# Patient Record
Sex: MALE | Race: WHITE | NOT HISPANIC OR LATINO
[De-identification: names, ages, dates, MRNs, and addresses within clinical notes are randomized per-mention and may not be internally consistent; named-entity substitution may affect disease eponyms.]

---

## 2017-02-27 ENCOUNTER — APPOINTMENT (OUTPATIENT)
Dept: PULMONOLOGY | Facility: CLINIC | Age: 72
End: 2017-02-27

## 2017-02-27 VITALS
DIASTOLIC BLOOD PRESSURE: 68 MMHG | BODY MASS INDEX: 34.91 KG/M2 | SYSTOLIC BLOOD PRESSURE: 110 MMHG | WEIGHT: 272 LBS | TEMPERATURE: 96.8 F | HEART RATE: 62 BPM | RESPIRATION RATE: 15 BRPM | HEIGHT: 74 IN

## 2018-02-05 ENCOUNTER — APPOINTMENT (OUTPATIENT)
Dept: PULMONOLOGY | Facility: CLINIC | Age: 73
End: 2018-02-05
Payer: MEDICARE

## 2018-02-05 VITALS
WEIGHT: 270 LBS | TEMPERATURE: 96.3 F | SYSTOLIC BLOOD PRESSURE: 129 MMHG | OXYGEN SATURATION: 97 % | BODY MASS INDEX: 34.65 KG/M2 | HEIGHT: 74 IN | HEART RATE: 69 BPM | RESPIRATION RATE: 16 BRPM | DIASTOLIC BLOOD PRESSURE: 89 MMHG

## 2018-02-05 DIAGNOSIS — E66.9 OBESITY, UNSPECIFIED: ICD-10-CM

## 2018-02-05 PROCEDURE — 99214 OFFICE O/P EST MOD 30 MIN: CPT

## 2019-03-04 ENCOUNTER — APPOINTMENT (OUTPATIENT)
Dept: PULMONOLOGY | Facility: CLINIC | Age: 74
End: 2019-03-04
Payer: MEDICARE

## 2019-03-04 VITALS
SYSTOLIC BLOOD PRESSURE: 135 MMHG | RESPIRATION RATE: 15 BRPM | TEMPERATURE: 97.7 F | WEIGHT: 275 LBS | HEART RATE: 60 BPM | DIASTOLIC BLOOD PRESSURE: 67 MMHG | BODY MASS INDEX: 35.29 KG/M2 | HEIGHT: 74 IN

## 2019-03-04 PROCEDURE — 99215 OFFICE O/P EST HI 40 MIN: CPT

## 2019-03-04 NOTE — HISTORY OF PRESENT ILLNESS
[FreeTextEntry1] : 74yo M with history of severe TATYANA on CPAP with excellent compliance and therapy related AHI of 0.4/h. He denies any mask leakages or problems related to the machine. He also feels well during the day and denies excessive daytime somnolence or other issues. \par Data from machine was reviewed and is as follows:\par DME -- [employer]\par Device -- Airsense 10 Autoset\par Percent days with device usage in last 30 days -- 100%\par Average usage (days used) -- 8h 49min\par Average treatment related BRANDI -- 0.4/h\par Mask leakage -- acceptable\par Pressure -- 13 cm H20 EPR 2\par

## 2019-03-04 NOTE — REVIEW OF SYSTEMS
[Negative] : Psychiatric [EDS: ESS=____] : no daytime somnolence [Difficulty Initiating Sleep] : no difficulty falling asleep [Difficulty Maintaining Sleep] : no difficulty maintaining sleep [Lower Extremity Discomfort] : no lower extremity discomfort [Unusual Sleep Behavior] : no unusual sleep behavior [Hypersomnolence] : not sleeping much more than usual

## 2019-03-04 NOTE — PHYSICAL EXAM
[Normal Appearance] : normal appearance [General Appearance - In No Acute Distress] : no acute distress [Normal Conjunctiva] : the conjunctiva exhibited no abnormalities [IV] : IV [Neck Appearance] : the appearance of the neck was normal [] : the neck was supple [Heart Rate And Rhythm] : heart rate was normal and rhythm regular [Heart Sounds] : normal S1 and S2 [Respiration, Rhythm And Depth] : normal respiratory rhythm and effort [Auscultation Breath Sounds / Voice Sounds] : lungs were clear to auscultation bilaterally [Involuntary Movements] : no involuntary movements were seen [Nail Clubbing] : no clubbing of the fingernails [Skin Color & Pigmentation] : normal skin color and pigmentation [No Focal Deficits] : no focal deficits [Oriented To Time, Place, And Person] : oriented to person, place, and time [Affect] : the affect was normal

## 2019-03-04 NOTE — ASSESSMENT
[FreeTextEntry1] : 74yo M with history of severe TATYANA on CPAP with excellent compliance and therapy related AHI of 0.4/h. He denies any mask leakages or problems related to the machine. He also feels well during the day and denies excessive daytime somnolence or other issues. \par Data from machine was reviewed and is as follows:\par DME -- [employer]\par Device -- Airsense 10 [employer]oset\par Percent days with device usage in last 30 days -- 100%\par Average usage (days used) -- 8h 49min\par Average treatment related BRANDI -- 0.4/h\par Mask leakage -- acceptable\par Pressure -- 13 cm H20 EPR 2\par \par Patient will continue to use CPAP nightly\par I explained the rationale for continued treatment of TATYANA -- to improve quality of life, daytime function and to decrease the cardiometabolic and other medical risks that are associated with untreated TATYANA. The patient verbalized understanding.\par I explained in detail the relationship between obesity and obstructive sleep apnea and the role of weight loss in improving severity of TATYANA.\par

## 2019-07-02 ENCOUNTER — INPATIENT (INPATIENT)
Facility: HOSPITAL | Age: 74
LOS: 13 days | Discharge: HOME | DRG: 327 | End: 2019-07-16
Attending: SURGERY | Admitting: INTERNAL MEDICINE
Payer: MEDICARE

## 2019-07-02 VITALS
RESPIRATION RATE: 14 BRPM | SYSTOLIC BLOOD PRESSURE: 135 MMHG | TEMPERATURE: 98 F | DIASTOLIC BLOOD PRESSURE: 65 MMHG | HEIGHT: 74 IN | WEIGHT: 278 LBS | HEART RATE: 66 BPM | OXYGEN SATURATION: 98 %

## 2019-07-02 DIAGNOSIS — Z91.89 OTHER SPECIFIED PERSONAL RISK FACTORS, NOT ELSEWHERE CLASSIFIED: ICD-10-CM

## 2019-07-02 DIAGNOSIS — R07.89 OTHER CHEST PAIN: ICD-10-CM

## 2019-07-02 DIAGNOSIS — Z29.9 ENCOUNTER FOR PROPHYLACTIC MEASURES, UNSPECIFIED: ICD-10-CM

## 2019-07-02 DIAGNOSIS — D64.9 ANEMIA, UNSPECIFIED: ICD-10-CM

## 2019-07-02 DIAGNOSIS — E11.9 TYPE 2 DIABETES MELLITUS WITHOUT COMPLICATIONS: ICD-10-CM

## 2019-07-02 DIAGNOSIS — N18.3 CHRONIC KIDNEY DISEASE, STAGE 3 (MODERATE): ICD-10-CM

## 2019-07-02 DIAGNOSIS — I10 ESSENTIAL (PRIMARY) HYPERTENSION: ICD-10-CM

## 2019-07-02 DIAGNOSIS — R42 DIZZINESS AND GIDDINESS: ICD-10-CM

## 2019-07-02 DIAGNOSIS — R06.09 OTHER FORMS OF DYSPNEA: ICD-10-CM

## 2019-07-02 LAB
ALBUMIN SERPL ELPH-MCNC: 4.4 G/DL — SIGNIFICANT CHANGE UP (ref 3.3–5)
ALP SERPL-CCNC: 54 U/L — SIGNIFICANT CHANGE UP (ref 40–120)
ALT FLD-CCNC: 18 U/L — SIGNIFICANT CHANGE UP (ref 10–45)
ANION GAP SERPL CALC-SCNC: 13 MMOL/L — SIGNIFICANT CHANGE UP (ref 5–17)
AST SERPL-CCNC: 20 U/L — SIGNIFICANT CHANGE UP (ref 10–40)
BASOPHILS # BLD AUTO: 0 K/UL — SIGNIFICANT CHANGE UP (ref 0–0.2)
BASOPHILS NFR BLD AUTO: 0 % — SIGNIFICANT CHANGE UP (ref 0–2)
BILIRUB SERPL-MCNC: 0.4 MG/DL — SIGNIFICANT CHANGE UP (ref 0.2–1.2)
BLD GP AB SCN SERPL QL: NEGATIVE — SIGNIFICANT CHANGE UP
BUN SERPL-MCNC: 27 MG/DL — HIGH (ref 7–23)
CALCIUM SERPL-MCNC: 8.9 MG/DL — SIGNIFICANT CHANGE UP (ref 8.4–10.5)
CHLORIDE SERPL-SCNC: 99 MMOL/L — SIGNIFICANT CHANGE UP (ref 96–108)
CO2 SERPL-SCNC: 27 MMOL/L — SIGNIFICANT CHANGE UP (ref 22–31)
CREAT SERPL-MCNC: 1.62 MG/DL — HIGH (ref 0.5–1.3)
EOSINOPHIL # BLD AUTO: 0.1 K/UL — SIGNIFICANT CHANGE UP (ref 0–0.5)
EOSINOPHIL NFR BLD AUTO: 3 % — SIGNIFICANT CHANGE UP (ref 0–6)
GLUCOSE BLDC GLUCOMTR-MCNC: 101 MG/DL — HIGH (ref 70–99)
GLUCOSE BLDC GLUCOMTR-MCNC: 137 MG/DL — HIGH (ref 70–99)
GLUCOSE SERPL-MCNC: 132 MG/DL — HIGH (ref 70–99)
HCT VFR BLD CALC: 24.7 % — LOW (ref 39–50)
HCT VFR BLD CALC: 25 % — LOW (ref 39–50)
HGB BLD-MCNC: 8.3 G/DL — LOW (ref 13–17)
HGB BLD-MCNC: 8.4 G/DL — LOW (ref 13–17)
LYMPHOCYTES # BLD AUTO: 0.6 K/UL — LOW (ref 1–3.3)
LYMPHOCYTES # BLD AUTO: 12.9 % — LOW (ref 13–44)
MAGNESIUM SERPL-MCNC: 2.2 MG/DL — SIGNIFICANT CHANGE UP (ref 1.6–2.6)
MCHC RBC-ENTMCNC: 30.1 PG — SIGNIFICANT CHANGE UP (ref 27–34)
MCHC RBC-ENTMCNC: 30.4 PG — SIGNIFICANT CHANGE UP (ref 27–34)
MCHC RBC-ENTMCNC: 33.6 GM/DL — SIGNIFICANT CHANGE UP (ref 32–36)
MCHC RBC-ENTMCNC: 33.7 GM/DL — SIGNIFICANT CHANGE UP (ref 32–36)
MCV RBC AUTO: 89.7 FL — SIGNIFICANT CHANGE UP (ref 80–100)
MCV RBC AUTO: 90.3 FL — SIGNIFICANT CHANGE UP (ref 80–100)
MONOCYTES # BLD AUTO: 0.4 K/UL — SIGNIFICANT CHANGE UP (ref 0–0.9)
MONOCYTES NFR BLD AUTO: 7.6 % — SIGNIFICANT CHANGE UP (ref 2–14)
NEUTROPHILS # BLD AUTO: 3.6 K/UL — SIGNIFICANT CHANGE UP (ref 1.8–7.4)
NEUTROPHILS NFR BLD AUTO: 76.5 % — SIGNIFICANT CHANGE UP (ref 43–77)
PLATELET # BLD AUTO: 155 K/UL — SIGNIFICANT CHANGE UP (ref 150–400)
PLATELET # BLD AUTO: 158 K/UL — SIGNIFICANT CHANGE UP (ref 150–400)
POTASSIUM SERPL-MCNC: 4.2 MMOL/L — SIGNIFICANT CHANGE UP (ref 3.5–5.3)
POTASSIUM SERPL-SCNC: 4.2 MMOL/L — SIGNIFICANT CHANGE UP (ref 3.5–5.3)
PROT SERPL-MCNC: 7.1 G/DL — SIGNIFICANT CHANGE UP (ref 6–8.3)
RBC # BLD: 2.75 M/UL — LOW (ref 4.2–5.8)
RBC # BLD: 2.77 M/UL — LOW (ref 4.2–5.8)
RBC # FLD: 16 % — HIGH (ref 10.3–14.5)
RBC # FLD: 16.1 % — HIGH (ref 10.3–14.5)
RH IG SCN BLD-IMP: POSITIVE — SIGNIFICANT CHANGE UP
SODIUM SERPL-SCNC: 139 MMOL/L — SIGNIFICANT CHANGE UP (ref 135–145)
WBC # BLD: 4.6 K/UL — SIGNIFICANT CHANGE UP (ref 3.8–10.5)
WBC # BLD: 4.7 K/UL — SIGNIFICANT CHANGE UP (ref 3.8–10.5)
WBC # FLD AUTO: 4.6 K/UL — SIGNIFICANT CHANGE UP (ref 3.8–10.5)
WBC # FLD AUTO: 4.7 K/UL — SIGNIFICANT CHANGE UP (ref 3.8–10.5)

## 2019-07-02 PROCEDURE — 71046 X-RAY EXAM CHEST 2 VIEWS: CPT | Mod: 26

## 2019-07-02 PROCEDURE — 99223 1ST HOSP IP/OBS HIGH 75: CPT

## 2019-07-02 RX ORDER — DEXTROSE 50 % IN WATER 50 %
15 SYRINGE (ML) INTRAVENOUS ONCE
Refills: 0 | Status: DISCONTINUED | OUTPATIENT
Start: 2019-07-02 | End: 2019-07-02

## 2019-07-02 RX ORDER — INSULIN LISPRO 100/ML
VIAL (ML) SUBCUTANEOUS
Refills: 0 | Status: DISCONTINUED | OUTPATIENT
Start: 2019-07-02 | End: 2019-07-12

## 2019-07-02 RX ORDER — HYDROCHLOROTHIAZIDE 25 MG
25 TABLET ORAL DAILY
Refills: 0 | Status: DISCONTINUED | OUTPATIENT
Start: 2019-07-02 | End: 2019-07-06

## 2019-07-02 RX ORDER — DEXTROSE 50 % IN WATER 50 %
25 SYRINGE (ML) INTRAVENOUS ONCE
Refills: 0 | Status: DISCONTINUED | OUTPATIENT
Start: 2019-07-02 | End: 2019-07-02

## 2019-07-02 RX ORDER — LABETALOL HCL 100 MG
400 TABLET ORAL DAILY
Refills: 0 | Status: DISCONTINUED | OUTPATIENT
Start: 2019-07-02 | End: 2019-07-02

## 2019-07-02 RX ORDER — LABETALOL HCL 100 MG
400 TABLET ORAL
Qty: 0 | Refills: 0 | DISCHARGE

## 2019-07-02 RX ORDER — LOSARTAN POTASSIUM 100 MG/1
50 TABLET, FILM COATED ORAL DAILY
Refills: 0 | Status: DISCONTINUED | OUTPATIENT
Start: 2019-07-02 | End: 2019-07-12

## 2019-07-02 RX ORDER — INSULIN LISPRO 100/ML
VIAL (ML) SUBCUTANEOUS AT BEDTIME
Refills: 0 | Status: DISCONTINUED | OUTPATIENT
Start: 2019-07-02 | End: 2019-07-02

## 2019-07-02 RX ORDER — SODIUM CHLORIDE 9 MG/ML
3 INJECTION INTRAMUSCULAR; INTRAVENOUS; SUBCUTANEOUS EVERY 8 HOURS
Refills: 0 | Status: DISCONTINUED | OUTPATIENT
Start: 2019-07-02 | End: 2019-07-16

## 2019-07-02 RX ORDER — SODIUM CHLORIDE 9 MG/ML
1000 INJECTION, SOLUTION INTRAVENOUS
Refills: 0 | Status: DISCONTINUED | OUTPATIENT
Start: 2019-07-02 | End: 2019-07-02

## 2019-07-02 RX ORDER — AMLODIPINE BESYLATE 2.5 MG/1
10 TABLET ORAL DAILY
Refills: 0 | Status: DISCONTINUED | OUTPATIENT
Start: 2019-07-02 | End: 2019-07-12

## 2019-07-02 RX ORDER — GLUCAGON INJECTION, SOLUTION 0.5 MG/.1ML
1 INJECTION, SOLUTION SUBCUTANEOUS ONCE
Refills: 0 | Status: DISCONTINUED | OUTPATIENT
Start: 2019-07-02 | End: 2019-07-02

## 2019-07-02 RX ORDER — DEXTROSE 50 % IN WATER 50 %
12.5 SYRINGE (ML) INTRAVENOUS ONCE
Refills: 0 | Status: DISCONTINUED | OUTPATIENT
Start: 2019-07-02 | End: 2019-07-02

## 2019-07-02 RX ORDER — HEPARIN SODIUM 5000 [USP'U]/ML
5000 INJECTION INTRAVENOUS; SUBCUTANEOUS EVERY 8 HOURS
Refills: 0 | Status: DISCONTINUED | OUTPATIENT
Start: 2019-07-02 | End: 2019-07-02

## 2019-07-02 RX ORDER — ATORVASTATIN CALCIUM 80 MG/1
20 TABLET, FILM COATED ORAL AT BEDTIME
Refills: 0 | Status: DISCONTINUED | OUTPATIENT
Start: 2019-07-02 | End: 2019-07-12

## 2019-07-02 RX ORDER — LABETALOL HCL 100 MG
200 TABLET ORAL
Refills: 0 | Status: DISCONTINUED | OUTPATIENT
Start: 2019-07-02 | End: 2019-07-12

## 2019-07-02 RX ADMIN — AMLODIPINE BESYLATE 10 MILLIGRAM(S): 2.5 TABLET ORAL at 18:26

## 2019-07-02 RX ADMIN — Medication 200 MILLIGRAM(S): at 18:26

## 2019-07-02 RX ADMIN — SODIUM CHLORIDE 3 MILLILITER(S): 9 INJECTION INTRAMUSCULAR; INTRAVENOUS; SUBCUTANEOUS at 17:33

## 2019-07-02 RX ADMIN — ATORVASTATIN CALCIUM 20 MILLIGRAM(S): 80 TABLET, FILM COATED ORAL at 22:25

## 2019-07-02 RX ADMIN — SODIUM CHLORIDE 3 MILLILITER(S): 9 INJECTION INTRAMUSCULAR; INTRAVENOUS; SUBCUTANEOUS at 22:20

## 2019-07-02 NOTE — H&P CARDIOLOGY - HISTORY OF PRESENT ILLNESS
74 yo  male (no implantable devices) PMH HTN, HLD and DMT2 presents for cardiac angiogram. Patient reports occasional episodes of moderate chest discomfort associated with dizziness, dyspnea and lightheadedness x 2 weeks ago. Seen and evaluated by Dr. Mortensen (cards) recommended to have nuclear stress test which was completed on 6/25/19 revealing normal results, normal EF and wall motion, diaphragmatic attenuation. Symptoms still persist- recommends to have [facility] for further ischemic evaluation. 74 yo  male (no implantable devices) PMH HTN, HLD, TATYANA and DMT2 (a1c June 2019, managed by PCP, uncomplicated) presents for cardiac angiogram. Patient reports occasional episodes of moderate chest discomfort associated with dizziness, dyspnea and lightheadedness with moderate exertion x 2 weeks ago. Seen and evaluated by Dr. Mortensen (cards) recommended to have nuclear stress test which was completed on 6/25/19 revealing normal results, normal EF and wall motion, diaphragmatic attenuation. Symptoms still persist- recommends to have C for further ischemic evaluation. 74 yo  male (no implantable devices) PMH HTN, HLD, TATYANA, CKD III and DMT2 (a1c June 2019, managed by PCP, uncomplicated) presents for cardiac angiogram. Patient reports occasional episodes of moderate chest discomfort associated with dizziness, dyspnea and lightheadedness with moderate exertion x 2 weeks ago. Seen and evaluated by Dr. Mortensen (cards) recommended to have nuclear stress test which was completed on 6/25/19 revealing normal results, normal EF and wall motion, diaphragmatic attenuation. Symptoms still persist- recommends to have C for further ischemic evaluation.

## 2019-07-02 NOTE — PROGRESS NOTE ADULT - SUBJECTIVE AND OBJECTIVE BOX
-MEDICINE TRANSFER ACCEPT NOTE    Dr. Sunny Parnell  [phone]    In brief. -MEDICINE TRANSFER ACCEPT NOTE    Dr. Sunny Parnell  [phone]    In brief.   74 yo M PMHx HTN, HLD, TATYANA, CKD III and DMT2 (a1c June 2019, managed by PCP, uncomplicated) presents for cardiac angiogram. Patient reports occasional episodes of moderate chest discomfort associated with dizziness, dyspnea and lightheadedness with moderate exertion x 2 weeks ago. Seen and evaluated by Dr. Mortensen (cards) recommended to have nuclear stress test which was completed on 6/25/19 revealing normal results, normal EF and wall motion, diaphragmatic attenuation. Symptoms still persist- recommends to have [facility] for further ischemic evaluation. (02 Jul 2019 09:24)      CONSTITUTIONAL: No fevers or chills  EYES/ENT: No visual changes. No discharge from eyes. No vertigo. No throat pain. No dysphagia.  NECK: No pain or stiffness or rigidity.  RESPIRATORY: No cough, wheezing, or hemoptysis. No shortness of breath.  CARDIOVASCULAR: No chest pain or palpitations.   GASTROINTESTINAL: No abdominal pain. No nausea, vomiting, or hematemesis; No diarrhea or constipation. No melena or hematochezia.  GENITOURINARY: No dysuria, hesitancy, frequency or hematuria.  NEUROLOGICAL: No numbness or weakness. No change in speech.  MSK: No joint swelling or erythema. No back pain.  SKIN: No itching or rashes.   PSYCH: Normal affect. Normal mood.    Review of systems negative except for items noted above.      PAST MEDICAL & SURGICAL HISTORY:  TATYANA (obstructive sleep apnea)  Mitral regurgitation  Diabetes  HLD (hyperlipidemia)  HTN (hypertension)  Abdominal hernia  Uncontrolled hypertension: BP ranged 170-250/--asymptomatic. Sent from [facility] office on 8/12/13 to [facility] ER via ambulance  Snoring: TATYANA precautions--responds affirmatively to STOP BANG questionnaire--admits to loud snoring and daytime somnolence; witnessed apneic events by wife; age &gt; 50; gender, male  Mass: right foot soft tissue mass in August 2013  Tooth decay: surgery for tooth extraction  S/P tonsillectomy      MEDICATIONS  (STANDING):  amLODIPine   Tablet 10 milliGRAM(s) Oral daily  atorvastatin 20 milliGRAM(s) Oral at bedtime  hydrochlorothiazide 25 milliGRAM(s) Oral daily  insulin lispro (HumaLOG) corrective regimen sliding scale   SubCutaneous three times a day before meals  labetalol 400 milliGRAM(s) Oral daily  losartan 50 milliGRAM(s) Oral daily  sodium chloride 0.9% lock flush 3 milliLiter(s) IV Push every 8 hours    MEDICATIONS  (PRN):      Allergies    No Known Allergies    Intolerances        Vital Signs Last 24 Hrs  T(C): 36.6 (02 Jul 2019 15:40), Max: 36.9 (02 Jul 2019 09:24)  T(F): 97.9 (02 Jul 2019 15:40), Max: 98.4 (02 Jul 2019 09:24)  HR: 57 (02 Jul 2019 15:40) (57 - 66)  BP: 150/70 (02 Jul 2019 15:40) (135/65 - 150/70)  BP(mean): 88 (02 Jul 2019 09:24) (88 - 88)  RR: 18 (02 Jul 2019 15:40) (14 - 18)  SpO2: 97% (02 Jul 2019 15:40) (97% - 98%)    T(C): 36.6 (07-02-19 @ 15:40), Max: 36.9 (07-02-19 @ 09:24)  T(F): 97.9 (07-02-19 @ 15:40), Max: 98.4 (07-02-19 @ 09:24)  HR: 57 (07-02-19 @ 15:40) (57 - 66)  BP: 150/70 (07-02-19 @ 15:40) (135/65 - 150/70)  ABP: --  ABP(mean): --  RR: 18 (07-02-19 @ 15:40) (14 - 18)  SpO2: 97% (07-02-19 @ 15:40) (97% - 98%)      CONSTITUTIONAL: No acute distress. Speaking in full sentences.   HEENT:  Head atraumatic. Conjunctiva clear B/L. Nasal mucosa normal. Moist oral mucosa. No posterior pharyngeal lesions noted.  Cardiovascular: RRR with no murmurs. No JVD noted. No lower extremity edema B/L.  Respiratory: Lungs CTAB. No accessory muscle use. Speaking in full sentences.  Gastrointestinal:  Soft, nontender. Non-distended. Non-rigid. No CVA tenderness B/L.  MSK:  No joint swelling. No joint erythema B/L. No midline spinal tenderness.  Vascular: Radial pulses 2+ B/L. Dorsalis pedis pulses 2+ B/L.  Neurologic:  Alert and awake. Oriented x3. Moving all extremities. Following commands. Making eye contact. No focal deficits.  Skin:  No rashes noted. No skin erythema noted. Extremities warm and well perfused throughout.  Psych:  Normal affect. Normal Mood.     LABS                        8.3    4.6   )-----------( 155      ( 02 Jul 2019 10:13 )             24.7     07-02    139  |  99  |  27<H>  ----------------------------<  132<H>  4.2   |  27  |  1.62<H>    Ca    8.9      02 Jul 2019 09:32  Mg     2.2     07-02    TPro  7.1  /  Alb  4.4  /  TBili  0.4  /  DBili  x   /  AST  20  /  ALT  18  /  AlkPhos  54  07-02          MICROBIOLOGY:    PERSONALLY REVIEWED -- RADIOLOGY & ADDITIONAL STUDIES: -MEDICINE TRANSFER ACCEPT NOTE    Dr. Sunny Parnell  [phone]    HPI  72 yo M PMHx HTN, HLD, TATYANA, CKD III and DMT2 who presents with 2 weeks of dizziness and dyspnea with exertion with occasional episodes of bilateral shoulder heaviness. The heaviness was reported to be nonradiating in his shoulders without additional exacerbating factors. Symptoms went away with rest. The patient was seen and evaluated by Dr. Mortensen (cardioliogy) for a nuclear stress test which was completed on 6/25/19 revealing no inducible ischemia. The patient yesterday played golf but again had the same symptoms with exertion. The patient presented for elective left heart cath however preoperative labs showing hemoglobin of 8.4 from 12 (this past May). Patient transferred to medicine for management and workup of anemia prior to any potential ischemic workup. Patient seen at bedside. Denies any dizziness with standing nor dizziness at rest. Denies any blood in the stool, abdominal pain, nausea, vomiting, diarrhea, fever, chills, back pain, hemoptysis, hematemesis. His last C-scope and EGD was 7 years ago and was "normal." He was given an EGD because of his hemoglobin of 12.     CONSTITUTIONAL: No fevers or chills  EYES/ENT: No visual changes. No discharge from eyes. No vertigo. No throat pain. No dysphagia.  NECK: No pain or stiffness or rigidity.  RESPIRATORY: No cough, wheezing, or hemoptysis. No shortness of breath.  CARDIOVASCULAR: No chest pain or palpitations.   GASTROINTESTINAL: No abdominal pain. No nausea, vomiting, or hematemesis; No diarrhea or constipation. No melena or hematochezia.  GENITOURINARY: No dysuria, hesitancy, frequency or hematuria.  NEUROLOGICAL: No numbness or weakness. No change in speech.  MSK: No joint swelling or erythema. No back pain.   SKIN: No itching or rashes.   PSYCH: Normal affect. Normal mood.    Review of systems negative except for items noted above.    PAST MEDICAL & SURGICAL HISTORY:  TATYANA (obstructive sleep apnea)  Mitral regurgitation  Diabetes  HLD (hyperlipidemia)  HTN (hypertension)  Abdominal hernia  Mass: right foot soft tissue mass in August 2013  Tooth decay: surgery for tooth extraction  S/P tonsillectomy    MEDICATIONS  (STANDING):  amLODIPine   Tablet 10 milliGRAM(s) Oral daily  atorvastatin 20 milliGRAM(s) Oral at bedtime  hydrochlorothiazide 25 milliGRAM(s) Oral daily  insulin lispro (HumaLOG) corrective regimen sliding scale   SubCutaneous three times a day before meals  labetalol 400 milliGRAM(s) Oral daily  losartan 50 milliGRAM(s) Oral daily  sodium chloride 0.9% lock flush 3 milliLiter(s) IV Push every 8 hours    MEDICATIONS  (PRN):  Allergies    Vital Signs Last 24 Hrs  T(C): 36.6 (02 Jul 2019 15:40), Max: 36.9 (02 Jul 2019 09:24)  T(F): 97.9 (02 Jul 2019 15:40), Max: 98.4 (02 Jul 2019 09:24)  HR: 57 (02 Jul 2019 15:40) (57 - 66)  BP: 150/70 (02 Jul 2019 15:40) (135/65 - 150/70)  BP(mean): 88 (02 Jul 2019 09:24) (88 - 88)  RR: 18 (02 Jul 2019 15:40) (14 - 18)  SpO2: 97% (02 Jul 2019 15:40) (97% - 98%)    T(C): 36.6 (07-02-19 @ 15:40), Max: 36.9 (07-02-19 @ 09:24)  T(F): 97.9 (07-02-19 @ 15:40), Max: 98.4 (07-02-19 @ 09:24)  HR: 57 (07-02-19 @ 15:40) (57 - 66)  BP: 150/70 (07-02-19 @ 15:40) (135/65 - 150/70)  RR: 18 (07-02-19 @ 15:40) (14 - 18)  SpO2: 97% (07-02-19 @ 15:40) (97% - 98%)    Orthostatic vital signs:  NEGATIVE.     CONSTITUTIONAL: No acute distress.   HEENT:  Head atraumatic. Conjunctiva clear B/L. Nasal mucosa normal. Moist oral mucosa. No posterior pharyngeal lesions noted.  Cardiovascular: RRR with no murmurs. No JVD noted. No lower extremity edema B/L. No carotid bruits B/L. Extremities warm and well perfused throughout.  Respiratory: Lungs CTAB. No accessory muscle use.   Gastrointestinal:  Soft, nontender. Non-distended. Non-rigid. No CVA tenderness B/L. BROWN STOOL noted in rectal vault.   MSK:  No joint swelling. No joint erythema B/L. No midline spinal tenderness.  Vascular: Radial pulses 2+ B/L. Dorsalis pedis pulses 2+ B/L.  Neurologic:  Alert and awake. Oriented x3. Moving all extremities. Following commands. Making eye contact. No focal deficits.  Skin:  No rashes noted. No skin erythema noted. No bruising noted on extremities or torso.   Psych:  Normal affect. Normal Mood.     LABS                        8.3    4.6   )-----------( 155      ( 02 Jul 2019 10:13 )             24.7     07-02    139  |  99  |  27<H>  ----------------------------<  132<H>  4.2   |  27  |  1.62<H>    Ca    8.9      02 Jul 2019 09:32  Mg     2.2     07-02    TPro  7.1  /  Alb  4.4  /  TBili  0.4  /  DBili  x   /  AST  20  /  ALT  18  /  AlkPhos  54  07-02    PERSONALLY REVIEWED -- RADIOLOGY & ADDITIONAL STUDIES:  EKG:  NSR. Nonspecific st-t changes.

## 2019-07-02 NOTE — PROGRESS NOTE ADULT - PROBLEM SELECTOR PLAN 1
Unclear etiology. Patient with Hgb of 12 in May. Rectal with brown stool. No physical exam findings suggesting acute blood loss. Possibly related to thyroid disease or nutritional deficiency. No gamma gap to suggests plasma cell dyscrasia.  -retic, serial cbc, b12, folate, iron panel.   -Type and screen.   -Patient's last screening colonoscopy was 7 years ago and he is unaware of when is next one is due. No polyps were detected per patient. Unclear etiology. Patient with Hgb of 12 in May. Rectal with brown stool. No physical exam findings suggesting acute blood loss. Possibly related to thyroid disease or nutritional deficiency. No gamma gap to suggests plasma cell dyscrasia.  -retic, serial cbc, tsh, b12, folate, iron panel.   -Type and screen.   -Patient's last screening colonoscopy was 7 years ago and he is unaware of when is next one is due. No polyps were detected per patient.  -Consider GI consult. Patient may benefit from screening colonoscopy, but may be able to be done outpatient. Unclear etiology. Patient with Hgb of 12 in May. Rectal with brown stool. No physical exam findings suggesting acute blood loss. Possibly related to thyroid disease or nutritional deficiency. No gamma gap to suggests plasma cell dyscrasia.  -ORTHOSTATICS - negative.   -retic, serial cbc, tsh, b12, folate, iron panel.   -Type and screen.   -Patient's last screening colonoscopy was 7 years ago and he is unaware of when is next one is due. No polyps were detected per patient.  -Consider GI consult. Patient may benefit from screening colonoscopy, but may be able to be done outpatient.  -HOLD HSQ till AM to ensure CBC stable.

## 2019-07-02 NOTE — PROGRESS NOTE ADULT - ASSESSMENT
72 yo M PMHx HTN, HLD, TATYANA, CKD III and DMT2 who presents with 2 weeks of dizziness and dyspnea on exertion found to have normocytic anemia.

## 2019-07-02 NOTE — PROGRESS NOTE ADULT - PROBLEM SELECTOR PLAN 2
Suspect secondary to anemia. Less likely cardiac in nature given negative nuclear stress test. EKG nonischemic.  -CXR PA/LAT  -On RA.   -Continue to monitor. Suspect secondary to anemia. Less likely cardiac in nature given negative nuclear stress test. EKG nonischemic. PNA unlikely given lack of cough, fever, and PE signs. VTE also unlikely given lack of LE edema, tachycardia, and risk factors.  -CXR PA/LAT  -On RA.   -Continue to monitor.

## 2019-07-02 NOTE — H&P CARDIOLOGY - PMH
Abdominal hernia    Diabetes    HLD (hyperlipidemia)    HTN (hypertension)    HTN (hypertension)    Mass  right foot soft tissue mass in August 2013  Mitral regurgitation    Snoring  TATYANA precautions--responds affirmatively to STOP BANG questionnaire--admits to loud snoring and daytime somnolence; witnessed apneic events by wife; age > 50; gender, male  Uncontrolled hypertension  BP ranged 170-250/--asymptomatic. Sent from PAST office on 8/12/13 to [facility] ER via ambulance Abdominal hernia    Diabetes    HLD (hyperlipidemia)    HTN (hypertension)    Mass  right foot soft tissue mass in August 2013  Mitral regurgitation    TATYANA (obstructive sleep apnea)    Snoring  TATYANA precautions--responds affirmatively to STOP BANG questionnaire--admits to loud snoring and daytime somnolence; witnessed apneic events by wife; age > 50; gender, male  Uncontrolled hypertension  BP ranged 170-250/--asymptomatic. Sent from PAST office on 8/12/13 to [facility] ER via ambulance

## 2019-07-02 NOTE — PROGRESS NOTE ADULT - PROBLEM SELECTOR PLAN 4
Continue home medications. BUN/Cr ratio suggestive of chronic process. Unknown baseline.   -Repeat BMP in AM.

## 2019-07-02 NOTE — PROGRESS NOTE ADULT - PROBLEM SELECTOR PLAN 3
Suspect secondary to anemia. Less likely cardiac in nature given negative nuclear stress test. Orthostatics negative.  -Anemia workup as above.  -Once workup complete, revisit need for ischemic workup with cardiology. Suspect secondary to anemia. Less likely cardiac in nature given negative nuclear stress test. Orthostatics negative. Exam nonfocal making stroke less likely.   -Anemia workup as above.  -Once workup complete, revisit need for ischemic workup with cardiology.

## 2019-07-03 LAB
ALBUMIN SERPL ELPH-MCNC: 3.9 G/DL — SIGNIFICANT CHANGE UP (ref 3.3–5)
ALP SERPL-CCNC: 48 U/L — SIGNIFICANT CHANGE UP (ref 40–120)
ALT FLD-CCNC: 14 U/L — SIGNIFICANT CHANGE UP (ref 10–45)
ANION GAP SERPL CALC-SCNC: 12 MMOL/L — SIGNIFICANT CHANGE UP (ref 5–17)
AST SERPL-CCNC: 19 U/L — SIGNIFICANT CHANGE UP (ref 10–40)
BASOPHILS # BLD AUTO: 0.04 K/UL — SIGNIFICANT CHANGE UP (ref 0–0.2)
BASOPHILS NFR BLD AUTO: 0.8 % — SIGNIFICANT CHANGE UP (ref 0–2)
BILIRUB SERPL-MCNC: 0.4 MG/DL — SIGNIFICANT CHANGE UP (ref 0.2–1.2)
BLD GP AB SCN SERPL QL: NEGATIVE — SIGNIFICANT CHANGE UP
BUN SERPL-MCNC: 27 MG/DL — HIGH (ref 7–23)
CALCIUM SERPL-MCNC: 9.2 MG/DL — SIGNIFICANT CHANGE UP (ref 8.4–10.5)
CHLORIDE SERPL-SCNC: 103 MMOL/L — SIGNIFICANT CHANGE UP (ref 96–108)
CO2 SERPL-SCNC: 29 MMOL/L — SIGNIFICANT CHANGE UP (ref 22–31)
CREAT SERPL-MCNC: 1.52 MG/DL — HIGH (ref 0.5–1.3)
EOSINOPHIL # BLD AUTO: 0.12 K/UL — SIGNIFICANT CHANGE UP (ref 0–0.5)
EOSINOPHIL NFR BLD AUTO: 2.4 % — SIGNIFICANT CHANGE UP (ref 0–6)
FERRITIN SERPL-MCNC: 17 NG/ML — LOW (ref 30–400)
FOLATE SERPL-MCNC: 12.8 NG/ML — SIGNIFICANT CHANGE UP
GLUCOSE BLDC GLUCOMTR-MCNC: 107 MG/DL — HIGH (ref 70–99)
GLUCOSE BLDC GLUCOMTR-MCNC: 122 MG/DL — HIGH (ref 70–99)
GLUCOSE BLDC GLUCOMTR-MCNC: 122 MG/DL — HIGH (ref 70–99)
GLUCOSE BLDC GLUCOMTR-MCNC: 142 MG/DL — HIGH (ref 70–99)
GLUCOSE BLDC GLUCOMTR-MCNC: 152 MG/DL — HIGH (ref 70–99)
GLUCOSE SERPL-MCNC: 102 MG/DL — HIGH (ref 70–99)
HBA1C BLD-MCNC: 5.4 % — SIGNIFICANT CHANGE UP (ref 4–5.6)
HCT VFR BLD CALC: 24.7 % — LOW (ref 39–50)
HCT VFR BLD CALC: 25 % — LOW (ref 39–50)
HGB BLD-MCNC: 7.2 G/DL — LOW (ref 13–17)
HGB BLD-MCNC: 8.2 G/DL — LOW (ref 13–17)
IMM GRANULOCYTES NFR BLD AUTO: 0.2 % — SIGNIFICANT CHANGE UP (ref 0–1.5)
IRON SATN MFR SERPL: 13 % — LOW (ref 16–55)
IRON SATN MFR SERPL: 40 UG/DL — LOW (ref 45–165)
LDH SERPL L TO P-CCNC: 163 U/L — SIGNIFICANT CHANGE UP (ref 50–242)
LYMPHOCYTES # BLD AUTO: 1.01 K/UL — SIGNIFICANT CHANGE UP (ref 1–3.3)
LYMPHOCYTES # BLD AUTO: 19.8 % — SIGNIFICANT CHANGE UP (ref 13–44)
MAGNESIUM SERPL-MCNC: 2.3 MG/DL — SIGNIFICANT CHANGE UP (ref 1.6–2.6)
MCHC RBC-ENTMCNC: 27.4 PG — SIGNIFICANT CHANGE UP (ref 27–34)
MCHC RBC-ENTMCNC: 29.1 GM/DL — LOW (ref 32–36)
MCHC RBC-ENTMCNC: 29.2 PG — SIGNIFICANT CHANGE UP (ref 27–34)
MCHC RBC-ENTMCNC: 32.6 GM/DL — SIGNIFICANT CHANGE UP (ref 32–36)
MCV RBC AUTO: 89.5 FL — SIGNIFICANT CHANGE UP (ref 80–100)
MCV RBC AUTO: 93.9 FL — SIGNIFICANT CHANGE UP (ref 80–100)
MONOCYTES # BLD AUTO: 0.55 K/UL — SIGNIFICANT CHANGE UP (ref 0–0.9)
MONOCYTES NFR BLD AUTO: 10.8 % — SIGNIFICANT CHANGE UP (ref 2–14)
NEUTROPHILS # BLD AUTO: 3.37 K/UL — SIGNIFICANT CHANGE UP (ref 1.8–7.4)
NEUTROPHILS NFR BLD AUTO: 66 % — SIGNIFICANT CHANGE UP (ref 43–77)
OB PNL STL: NEGATIVE — SIGNIFICANT CHANGE UP
PHOSPHATE SERPL-MCNC: 2.9 MG/DL — SIGNIFICANT CHANGE UP (ref 2.5–4.5)
PLATELET # BLD AUTO: 155 K/UL — SIGNIFICANT CHANGE UP (ref 150–400)
PLATELET # BLD AUTO: 161 K/UL — SIGNIFICANT CHANGE UP (ref 150–400)
POTASSIUM SERPL-MCNC: 4.1 MMOL/L — SIGNIFICANT CHANGE UP (ref 3.5–5.3)
POTASSIUM SERPL-SCNC: 4.1 MMOL/L — SIGNIFICANT CHANGE UP (ref 3.5–5.3)
PROT SERPL-MCNC: 6.5 G/DL — SIGNIFICANT CHANGE UP (ref 6–8.3)
RBC # BLD: 2.63 M/UL — LOW (ref 4.2–5.8)
RBC # BLD: 2.63 M/UL — LOW (ref 4.2–5.8)
RBC # BLD: 2.8 M/UL — LOW (ref 4.2–5.8)
RBC # FLD: 16.2 % — HIGH (ref 10.3–14.5)
RBC # FLD: 16.5 % — HIGH (ref 10.3–14.5)
RETICS #: 110.2 K/UL — SIGNIFICANT CHANGE UP (ref 25–125)
RETICS/RBC NFR: 4.2 % — HIGH (ref 0.5–2.5)
RH IG SCN BLD-IMP: POSITIVE — SIGNIFICANT CHANGE UP
SODIUM SERPL-SCNC: 144 MMOL/L — SIGNIFICANT CHANGE UP (ref 135–145)
TIBC SERPL-MCNC: 318 UG/DL — SIGNIFICANT CHANGE UP (ref 220–430)
TRANSFERRIN SERPL-MCNC: 270 MG/DL — SIGNIFICANT CHANGE UP (ref 200–360)
TSH SERPL-MCNC: 2.99 UIU/ML — SIGNIFICANT CHANGE UP (ref 0.27–4.2)
UIBC SERPL-MCNC: 278 UG/DL — SIGNIFICANT CHANGE UP (ref 110–370)
VIT B12 SERPL-MCNC: 722 PG/ML — SIGNIFICANT CHANGE UP (ref 232–1245)
WBC # BLD: 4.8 K/UL — SIGNIFICANT CHANGE UP (ref 3.8–10.5)
WBC # BLD: 5.1 K/UL — SIGNIFICANT CHANGE UP (ref 3.8–10.5)
WBC # FLD AUTO: 4.8 K/UL — SIGNIFICANT CHANGE UP (ref 3.8–10.5)
WBC # FLD AUTO: 5.1 K/UL — SIGNIFICANT CHANGE UP (ref 3.8–10.5)

## 2019-07-03 PROCEDURE — 71250 CT THORAX DX C-: CPT | Mod: 26

## 2019-07-03 PROCEDURE — 99223 1ST HOSP IP/OBS HIGH 75: CPT | Mod: GC

## 2019-07-03 PROCEDURE — 93010 ELECTROCARDIOGRAM REPORT: CPT

## 2019-07-03 PROCEDURE — 99232 SBSQ HOSP IP/OBS MODERATE 35: CPT

## 2019-07-03 RX ADMIN — LOSARTAN POTASSIUM 50 MILLIGRAM(S): 100 TABLET, FILM COATED ORAL at 05:21

## 2019-07-03 RX ADMIN — ATORVASTATIN CALCIUM 20 MILLIGRAM(S): 80 TABLET, FILM COATED ORAL at 21:26

## 2019-07-03 RX ADMIN — Medication 25 MILLIGRAM(S): at 05:21

## 2019-07-03 RX ADMIN — Medication 1: at 12:55

## 2019-07-03 RX ADMIN — SODIUM CHLORIDE 3 MILLILITER(S): 9 INJECTION INTRAMUSCULAR; INTRAVENOUS; SUBCUTANEOUS at 17:24

## 2019-07-03 RX ADMIN — Medication 200 MILLIGRAM(S): at 05:21

## 2019-07-03 RX ADMIN — AMLODIPINE BESYLATE 10 MILLIGRAM(S): 2.5 TABLET ORAL at 05:21

## 2019-07-03 RX ADMIN — SODIUM CHLORIDE 3 MILLILITER(S): 9 INJECTION INTRAMUSCULAR; INTRAVENOUS; SUBCUTANEOUS at 05:18

## 2019-07-03 RX ADMIN — SODIUM CHLORIDE 3 MILLILITER(S): 9 INJECTION INTRAMUSCULAR; INTRAVENOUS; SUBCUTANEOUS at 20:32

## 2019-07-03 NOTE — PROGRESS NOTE ADULT - SUBJECTIVE AND OBJECTIVE BOX
Mr. Pittman is a 72 y/o/m with pmhx of  HTN, HLD, TATYANA, CKD III and DMII admitted 7/2 w/ cc of dizziness / dyspnea with exertion with occasional episodes of bilateral shoulder heaviness. The heaviness was reported to be nonradiating in his shoulders without additional exacerbating factors. Symptoms went away with rest. The patient was seen and evaluated by Dr. Mortensen (cardioliogy) for a nuclear stress test which was completed on 6/25/19 revealing no inducible ischemia. The patient yesterday played golf but again had the same symptoms with exertion. The patient presented for elective left heart cath however preoperative labs showing hemoglobin of 8.4 from 12 (this past May). Patient transferred to medicine for management and workup of anemia prior to any potential ischemic workup. Patient seen at bedside. Denies any dizziness with standing nor dizziness at rest. Denies any blood in the stool, abdominal pain, nausea, vomiting, diarrhea, fever, chills, back pain, hemoptysis, hematemesis. His last C-scope and EGD was 7 years ago and was "normal." He was given an EGD because of his hemoglobin of 12.     REVIEW OF SYSTEMS:  General: NAD, hemodynamically stable, (-)  fever, (-) chills, (-) weakness  HEENT:  Eyes:  No visual loss, blurred vision, double vision or yellow sclerae. Ears, Nose, Throat:  No hearing loss, sneezing, congestion, runny nose or sore throat.  SKIN:  No rash or itching.  CARDIOVASCULAR:  No chest pain, chest pressure or chest discomfort. No palpitations or edema.  RESPIRATORY:  No shortness of breath, cough or sputum.  GASTROINTESTINAL:  No anorexia, nausea, vomiting or diarrhea. No abdominal pain or blood.  NEUROLOGICAL:  No headache, dizziness, syncope, paralysis, ataxia, numbness or tingling in the extremities. No change in bowel or bladder control.  MUSCULOSKELETAL:  No muscle, back pain, joint pain or stiffness.  HEMATOLOGIC:  No anemia, bleeding or bruising.  LYMPHATICS:  No enlarged nodes. No history of splenectomy.  ENDOCRINOLOGIC:  No reports of sweating, cold or heat intolerance. No polyuria or polydipsia.  ALLERGIES:  No history of asthma, hives, eczema or rhinitis.    Physical Exam:   GENERAL APPEARANCE:  NAD, hemodynamically stable  T(C): 36.7 (07-03-19 @ 04:40), Max: 37.1 (07-02-19 @ 20:23)  HR: 58 (07-03-19 @ 05:17) (51 - 69)  BP: 115/63 (07-03-19 @ 04:40) (115/63 - 150/70)  RR: 16 (07-03-19 @ 04:40) (16 - 18)  SpO2: 95% (07-03-19 @ 05:17) (94% - 99%)  Wt(kg): --  HEENT:  Head is normocephalic    Skin:  Warm and dry without any rash   NECK:  Supple without lymphadenopathy.   HEART:  Regular rate and rhythm. normal S1 and S2, No M/R/G  LUNGS:  Good ins/exp effort, no W/R/R/C  ABDOMEN:  Soft, nontender, nondistended with good bowel sounds heard  EXTREMITIES:  Without cyanosis, clubbing or edema.   NEUROLOGICAL:  Gross nonfocal     Labs:   CBC Full  -  ( 02 Jul 2019 10:13 )  WBC Count : 4.6 K/uL  RBC Count : 2.75 M/uL  Hemoglobin : 8.3 g/dL  Hematocrit : 24.7 %  Platelet Count - Automated : 155 K/uL      144  |  103  |  27<H>  ----------------------------<  102<H>  4.1   |  29  |  1.52<H>    Ca    9.2      03 Jul 2019 05:34  Phos  2.9     07-03  Mg     2.3     07-03    TPro  6.5  /  Alb  3.9  /  TBili  0.4  /  DBili  x   /  AST  19  /  ALT  14  /  AlkPhos  48  07-03      #  Normocytic anemia  - Unclear etiology / No signs of bleeding  - Patient with Hgb of 12 in May - trended down to 8s  - ORTHOSTATICS - negative.   - retic, serial cbc, tsh, b12, folate, iron panel.   - Type and screen.   - Patient's last screening colonoscopy was 7 years ago and he is unaware of when is next one is due. No polyps were detected per patient.  # Dyspnea on exertion  - Suspect secondary to anemia. Less likely cardiac in nature given negative nuclear stress test. EKG nonischemic. PNA unlikely given lack of cough, fever, and PE signs. VTE also unlikely given lack of LE edema, tachycardia, and risk factors.  -CXR PA/LAT  -On RA.   -Continue to monitor.     # Dizziness  - Suspect secondary to anemia. Less likely cardiac in nature given negative nuclear stress test. Orthostatics negative. Exam nonfocal making stroke less likely.   - Anemia workup as above.  - Once workup complete, revisit need for ischemic workup with cardiology.     # CKD (chronic kidney disease) stage 3, GFR 30-59 ml/min  - BUN/Cr ratio suggestive of chronic process. Unknown baseline.   - Scr 1.62-> 1.52    # HTN (hypertension)  -  Continue home medications.     # Type 2 diabetes mellitus  -  LSSI  - Hgab1c  - consistent carb / dash diet.     #  Transition of care performed with sharing of clinical summary.  Plan: Cardiologist:  Dr. Koenig:  [phone]; Aware patient admitted. Mr. Pittman is a 74 y/o/m with pmhx of  HTN, HLD, TATYANA, CKD III and DMII admitted 7/2 w/ cc of dizziness / dyspnea with exertion with occasional episodes of bilateral shoulder heaviness. The heaviness was reported to be nonradiating in his shoulders without additional exacerbating factors. Symptoms went away with rest. The patient was seen and evaluated by Dr. Mortensen (cardioliogy) for a nuclear stress test which was completed on 6/25/19 revealing no inducible ischemia. The patient yesterday played golf but again had the same symptoms with exertion. The patient presented for elective left heart cath however preoperative labs showing hemoglobin of 8.4 from 12 (this past May). Patient transferred to medicine for management and workup of anemia prior to any potential ischemic workup. Patient seen at bedside. Denies any dizziness with standing nor dizziness at rest. Denies any blood in the stool, abdominal pain, nausea, vomiting, diarrhea, fever, chills, back pain, hemoptysis, hematemesis. His last C-scope and EGD was 7 years ago and was "normal." He was given an EGD because of his hemoglobin of 12.     7/3: seen and eval. hemodynamically stable. Denies any HA, CP, SOB, orthopnea, LAUREN, no further shoulder chest heaviness. Comfortable. We have discussed anemia workup, which is currently pending with heme follow up. Noted Reticular opacities on the lung, pending CT of the chest /  pulmonary follow up.     REVIEW OF SYSTEMS:  General: NAD, hemodynamically stable, (-)  fever, (-) chills, (-) weakness  HEENT:  Eyes:  No visual loss, blurred vision, double vision or yellow sclerae. Ears, Nose, Throat:  No hearing loss, sneezing, congestion, runny nose or sore throat.  SKIN:  No rash or itching.  CARDIOVASCULAR:  No chest pain, chest pressure or chest discomfort. No palpitations or edema.  RESPIRATORY:  No shortness of breath, cough or sputum.  GASTROINTESTINAL:  No anorexia, nausea, vomiting or diarrhea. No abdominal pain or blood.  NEUROLOGICAL:  No headache, dizziness, syncope, paralysis, ataxia, numbness or tingling in the extremities. No change in bowel or bladder control.  MUSCULOSKELETAL:  No muscle, back pain, joint pain or stiffness.  HEMATOLOGIC:  new anemia  LYMPHATICS:  No enlarged nodes. No history of splenectomy.  ENDOCRINOLOGIC:  No reports of sweating, cold or heat intolerance. No polyuria or polydipsia.  ALLERGIES:  No history of asthma, hives, eczema or rhinitis.    Physical Exam:   GENERAL APPEARANCE:  NAD, hemodynamically stable  T(C): 36.7 (07-03-19 @ 04:40), Max: 37.1 (07-02-19 @ 20:23)  HR: 58 (07-03-19 @ 05:17) (51 - 69)  BP: 115/63 (07-03-19 @ 04:40) (115/63 - 150/70)  RR: 16 (07-03-19 @ 04:40) (16 - 18)  SpO2: 95% (07-03-19 @ 05:17) (94% - 99%)  Wt(kg): --  HEENT:  Head is normocephalic    Skin:  pale  NECK:  Supple without lymphadenopathy.   HEART:  Regular rate and rhythm. normal S1 and S2, No M/R/G  LUNGS:  Good ins/exp effort, no W/R/R/C  ABDOMEN:  Soft, nontender, nondistended with good bowel sounds heard  EXTREMITIES:  Without cyanosis, clubbing or edema.   NEUROLOGICAL:  Gross nonfocal     Labs:   CBC Full  -  ( 02 Jul 2019 10:13 )  WBC Count : 4.6 K/uL  RBC Count : 2.75 M/uL  Hemoglobin : 8.3 g/dL  Hematocrit : 24.7 %  Platelet Count - Automated : 155 K/uL      144  |  103  |  27<H>  ----------------------------<  102<H>  4.1   |  29  |  1.52<H>    Ca    9.2      03 Jul 2019 05:34  Phos  2.9     07-03  Mg     2.3     07-03    TPro  6.5  /  Alb  3.9  /  TBili  0.4  /  DBili  x   /  AST  19  /  ALT  14  /  AlkPhos  48  07-03      #  Normocytic anemia  - Unclear etiology / No signs of bleeding  - Patient with Hgb of 12 in May - trended down to 8s  - ORTHOSTATICS - negative.   - retic, serial cbc, tsh, b12, folate, iron panel.   - Type and screen  - heme evaluation pending  - Patient's last screening colonoscopy was 7 years ago and he is unaware of when is next one is due. No polyps were detected per patient.    # Dyspnea on exertion  - Suspect secondary to anemia. Less likely cardiac in nature given negative nuclear stress test. EKG nonischemic. PNA unlikely given lack of cough, fever, and PE signs. VTE also unlikely given lack of LE edema, tachycardia, and risk factors.  -CXR PA/LAT  -On RA.   -Continue to monitor.     # Dizziness  - Suspect secondary to anemia. Less likely cardiac in nature given negative nuclear stress test. Orthostatics negative. Exam nonfocal making stroke less likely.   - Anemia workup as above.  - Once workup complete, revisit need for ischemic workup with cardiology.     # Abnormal chest XRAY  - pending non-contrast chest CT  - pulmonary evaluation    # CKD (chronic kidney disease) stage 3, GFR 30-59 ml/min  - BUN/Cr ratio suggestive of chronic process. Unknown baseline.   - Scr 1.62-> 1.52    # HTN (hypertension)  -  Continue home medications.     # Type 2 diabetes mellitus  -  LSSI  - Hgab1c  - consistent carb / dash diet.     #  Transition of care performed with sharing of clinical summary.  Plan: Cardiologist:  Dr. Koenig:  [phone]; Aware patient admitted. Mr. Pittman is a 74 y/o/m with pmhx of  HTN, HLD, TATYANA, CKD III and DMII admitted 7/2 w/ cc of dizziness / dyspnea with exertion with occasional episodes of bilateral shoulder heaviness. The heaviness was reported to be nonradiating in his shoulders without additional exacerbating factors. Symptoms went away with rest. The patient was seen and evaluated by Dr. Mortensen (cardioliogy) for a nuclear stress test which was completed on 6/25/19 revealing no inducible ischemia. The patient yesterday played golf but again had the same symptoms with exertion. The patient presented for elective left heart cath however preoperative labs showing hemoglobin of 8.4 from 12 (this past May). Patient transferred to medicine for management and workup of anemia prior to any potential ischemic workup. Patient seen at bedside. Denies any dizziness with standing nor dizziness at rest. Denies any blood in the stool, abdominal pain, nausea, vomiting, diarrhea, fever, chills, back pain, hemoptysis, hematemesis. His last C-scope and EGD was 7 years ago and was "normal." He was given an EGD because of his hemoglobin of 12.     7/3: seen and eval. hemodynamically stable. Denies any HA, CP, SOB, orthopnea, LAUREN, no further shoulder chest heaviness. Comfortable. We have discussed anemia workup, which is currently pending with heme follow up. Noted Reticular opacities on the lung, pending CT of the chest /  pulmonary follow up. Family (wife) informed.     REVIEW OF SYSTEMS:  General: NAD, hemodynamically stable, (-)  fever, (-) chills, (-) weakness  HEENT:  Eyes:  No visual loss, blurred vision, double vision or yellow sclerae. Ears, Nose, Throat:  No hearing loss, sneezing, congestion, runny nose or sore throat.  SKIN:  No rash or itching.  CARDIOVASCULAR:  No chest pain, chest pressure or chest discomfort. No palpitations or edema.  RESPIRATORY:  No shortness of breath, cough or sputum.  GASTROINTESTINAL:  No anorexia, nausea, vomiting or diarrhea. No abdominal pain or blood.  NEUROLOGICAL:  No headache, dizziness, syncope, paralysis, ataxia, numbness or tingling in the extremities. No change in bowel or bladder control.  MUSCULOSKELETAL:  No muscle, back pain, joint pain or stiffness.  HEMATOLOGIC:  new anemia  LYMPHATICS:  No enlarged nodes. No history of splenectomy.  ENDOCRINOLOGIC:  No reports of sweating, cold or heat intolerance. No polyuria or polydipsia.  ALLERGIES:  No history of asthma, hives, eczema or rhinitis.    Physical Exam:   GENERAL APPEARANCE:  NAD, hemodynamically stable  T(C): 36.7 (07-03-19 @ 04:40), Max: 37.1 (07-02-19 @ 20:23)  HR: 58 (07-03-19 @ 05:17) (51 - 69)  BP: 115/63 (07-03-19 @ 04:40) (115/63 - 150/70)  RR: 16 (07-03-19 @ 04:40) (16 - 18)  SpO2: 95% (07-03-19 @ 05:17) (94% - 99%)  Wt(kg): --  HEENT:  Head is normocephalic    Skin:  pale  NECK:  Supple without lymphadenopathy.   HEART:  Regular rate and rhythm. normal S1 and S2, No M/R/G  LUNGS:  Good ins/exp effort, no W/R/R/C  ABDOMEN:  Soft, nontender, nondistended with good bowel sounds heard  EXTREMITIES:  Without cyanosis, clubbing or edema.   NEUROLOGICAL:  Gross nonfocal     Labs:   CBC Full  -  ( 02 Jul 2019 10:13 )  WBC Count : 4.6 K/uL  RBC Count : 2.75 M/uL  Hemoglobin : 8.3 g/dL  Hematocrit : 24.7 %  Platelet Count - Automated : 155 K/uL      144  |  103  |  27<H>  ----------------------------<  102<H>  4.1   |  29  |  1.52<H>    Ca    9.2      03 Jul 2019 05:34  Phos  2.9     07-03  Mg     2.3     07-03    TPro  6.5  /  Alb  3.9  /  TBili  0.4  /  DBili  x   /  AST  19  /  ALT  14  /  AlkPhos  48  07-03      #  Normocytic anemia  - Unclear etiology / No signs of bleeding / ? iron deficiency anemia  - patient in the past anemic -  as per wife follows up with Dr. Heredia, it was never discovered why the patient was anemic  - Patient with Hgb of 12 in May - trended down to 8s  - retic, serial cbc, tsh, b12, folate, iron panel.   - Type and screen  - heme evaluation pending fot 7/4  - will reach out to patient's GI for colonoscopy results. once lab results available patient might benefit from GI eval / CT abd/pelvis/ ? EGD / colonoscopy  - Patient's last screening colonoscopy was 7 years ago - as per patient it was normal     # Dyspnea on exertion  - Suspect secondary to anemia. Less likely cardiac in nature given negative nuclear stress test. EKG nonischemic. PNA unlikely given lack of cough, fever, and PE signs. VTE also unlikely given lack of LE edema, tachycardia, and risk factors.  - CT: interstitial lung disease of undetermined etiology. 1.3 cm short axis subcarinal lymph node (series 3 image 84).   Recommend follow-up chest CT in 3 months to determine stability.  - On RA.   - Continue to monitor.     # Dizziness  - Suspect secondary to anemia. Less likely cardiac in nature given negative nuclear stress test. Orthostatics negative. Exam nonfocal making stroke less likely.   - Anemia workup as above.  - Once workup complete, revisit need for ischemic workup with cardiology.     # Abnormal chest XRAY  - CT: interstitial lung disease of undetermined etiology. 1.3 cm short axis subcarinal lymph node (series 3 image 84).   Recommend follow-up chest CT in 3 months to determine stability.  - pulmonary evaluation    # CKD (chronic kidney disease) stage 3, GFR 30-59 ml/min  - BUN/Cr ratio suggestive of chronic process. Unknown baseline.   - Scr 1.62-> 1.52    # HTN (hypertension)  -  Continue home medications.     # Type 2 diabetes mellitus  -  LSSI  - Hgab1c  - consistent carb / dash diet.     #  Transition of care performed with sharing of clinical summary.  Plan: Cardiologist:  Dr. Koenig:  [phone]; Aware patient admitted.

## 2019-07-03 NOTE — CONSULT NOTE ADULT - ASSESSMENT
72 yo M PMHx HTN, HLD, TATYANA, CKD III and DMT2 who presents with 2 weeks of dizziness and dyspnea on exertion found to have normocytic anemia.     - PNA unlikely given lack of cough, fever,  - Noted Reticular opacities on the lung, pending CT of the chest /  pulmonary follow up. 72 yo M PMHx HTN, HLD, TATYANA, CKD III and DMT2 who presents with 2 weeks of dizziness and dyspnea on exertion found to have normocytic anemia and CXR interval increase of bibasilar reticular opacities suspicious for interstitial lung disease.     - PNA unlikely given lack of symptoms and no fever; pt reports occasional shoulder tightness and 2 episodes of pre-syncope with exertion over the past week  - pts symptoms likely 2/2 symptomatic anemia  - pt reports compliance with home CPAP, no hx hypoxia or need for intubation in the past  - no known lung dz hx other than TATYANA previously  - pending CT of the chest non-contrast, would consider adding CT a/p for further anemia w/u  - will f/u CT chest prior to deciding on the need for further workup 72 yo M PMHx HTN, HLD, TATYANA, CKD III and DMT2 who presents with 2 weeks of dizziness and dyspnea on exertion found to have normocytic anemia and CXR interval increase of bibasilar reticular opacities suspicious for interstitial lung disease.     - PNA unlikely given lack of symptoms and no fever; pt reports occasional shoulder tightness and 2 episodes of pre-syncope with exertion over the past week  - pts symptoms likely 2/2 symptomatic anemia  - pt reports compliance with home CPAP, no hx hypoxia or need for intubation in the past  - no known lung dz hx other than TATYANA previously  - pending CT of the chest non-contrast, would consider adding CT a/p for further anemia w/u  - recommend sending ELOISA, ESR, sjogrens, scleroderma, RF, SRIKANTH, CPK, ANCA (MPO and PROT3)

## 2019-07-03 NOTE — CONSULT NOTE ADULT - SUBJECTIVE AND OBJECTIVE BOX
HPI:  74 yo M PMHx HTN, HLD, TATYANA, CKD III and DMT2 who presents with 2 weeks of dizziness and dyspnea with exertion with occasional episodes of bilateral shoulder heaviness. The heaviness was reported to be nonradiating in his shoulders without additional exacerbating factors. Symptoms went away with rest. The patient was seen and evaluated by Dr. Mortensen (cardioliogy) for a nuclear stress test which was completed on 6/25/19 revealing no inducible ischemia. The patient yesterday played golf but again had the same symptoms with exertion. The patient presented for elective left heart cath however preoperative labs showing hemoglobin of 8.4 from 12 (this past May). Patient transferred to medicine for management and workup of anemia prior to any potential ischemic workup. Patient seen at bedside. Denies any dizziness with standing nor dizziness at rest. Denies any blood in the stool, abdominal pain, nausea, vomiting, diarrhea, fever, chills, back pain, hemoptysis, hematemesis. His last C-scope and EGD was 7 years ago and was "normal." He was given an EGD because of his hemoglobin of 12.      Past Medical History:  Abdominal hernia    Diabetes    HLD (hyperlipidemia)    HTN (hypertension)    Mass  right foot soft tissue mass in August 2013  Mitral regurgitation    TATYANA (obstructive sleep apnea)    Snoring  TATYANA precautions--responds affirmatively to STOP BANG questionnaire--admits to loud snoring and daytime somnolence; witnessed apneic events by wife; age > 50; gender, male  Uncontrolled hypertension  BP ranged 170-250/--asymptomatic. Sent from [facility] office on 8/12/13 to [facility] ER via ambulance.     Past Surgical History:  S/P tonsillectomy    Tooth decay  surgery for tooth extraction.       Social History:  · Marital Status		  · Occupation	retired [employer]	     Substance Use History:  · Substance Use	never used	     Tobacco Usage:  · Tobacco Usage: Never smoker	      Home Medications:   * Patient Currently Takes Medications as of 02-Jul-2019 09:32 documented in Structured Notes  · 	labetalol: Last Dose Taken:  , 400 milligram(s) orally once a day  · 	Norvasc 10 mg oral tablet: Last Dose Taken:  , 1 tab(s) orally once a day  · 	losartan 50 mg oral tablet: Last Dose Taken:  , 1 tab(s) orally once a day  · 	Crestor 5 mg oral tablet: Last Dose Taken:  , 1 tab(s) orally once a day  · 	hydroCHLOROthiazide 25 mg oral tablet: Last Dose Taken:  , 1 tab(s) orally once a day  · 	glipiZIDE 2.5 mg oral tablet, extended release: Last Dose Taken:  , 1 tab(s) orally once a day    [ ] All other systems negative  [ ] Unable to assess ROS because ________    OBJECTIVE:  ICU Vital Signs Last 24 Hrs  T(C): 36.7 (03 Jul 2019 04:40), Max: 37.1 (02 Jul 2019 20:23)  T(F): 98 (03 Jul 2019 04:40), Max: 98.7 (02 Jul 2019 20:23)  HR: 62 (03 Jul 2019 10:26) (51 - 69)  BP: 115/63 (03 Jul 2019 04:40) (115/63 - 150/70)  BP(mean): --  ABP: --  ABP(mean): --  RR: 16 (03 Jul 2019 04:40) (16 - 18)  SpO2: 95% (03 Jul 2019 10:26) (94% - 99%)        07-02 @ 07:01  -  07-03 @ 07:00  --------------------------------------------------------  IN: 360 mL / OUT: 0 mL / NET: 360 mL    07-03 @ 07:01 - 07-03 @ 13:00  --------------------------------------------------------  IN: 360 mL / OUT: 0 mL / NET: 360 mL      CAPILLARY BLOOD GLUCOSE      POCT Blood Glucose.: 152 mg/dL (03 Jul 2019 12:18)      PHYSICAL EXAM:  General: Awake, alert, oriented X 3.   HEENT: Atraumatic, normocephalic.                  No tonsillar or pharyngeal exudates.  Lymph Nodes: No palpable lymphadenopathy  Neck: No JVD. No carotid bruit.   Respiratory: Normal chest expansion                         Normal percussion                         Normal and equal air entry                         No wheeze, rhonchi or rales.  Cardiovascular: S1 S2 normal. No murmurs, rubs or gallops.   Abdomen: Soft, non-tender, non-distended. No organomegaly.  Extremities: Warm to touch. Peripheral pulse palpable. No pedal edema.   Skin: No rashes or skin lesions  Neurological: Non-focal  Psychiatry: Appropriate mood and affect.    HOSPITAL MEDICATIONS:  MEDICATIONS  (STANDING):  amLODIPine   Tablet 10 milliGRAM(s) Oral daily  atorvastatin 20 milliGRAM(s) Oral at bedtime  hydrochlorothiazide 25 milliGRAM(s) Oral daily  insulin lispro (HumaLOG) corrective regimen sliding scale   SubCutaneous three times a day before meals  labetalol 200 milliGRAM(s) Oral two times a day  losartan 50 milliGRAM(s) Oral daily  sodium chloride 0.9% lock flush 3 milliLiter(s) IV Push every 8 hours    MEDICATIONS  (PRN):      LABS:                        7.2    5.10  )-----------( 155      ( 03 Jul 2019 11:17 )             24.7     07-03    144  |  103  |  27<H>  ----------------------------<  102<H>  4.1   |  29  |  1.52<H>    Ca    9.2      03 Jul 2019 05:34  Phos  2.9     07-03  Mg     2.3     07-03    TPro  6.5  /  Alb  3.9  /  TBili  0.4  /  DBili  x   /  AST  19  /  ALT  14  /  AlkPhos  48  07-03              MICROBIOLOGY:     RADIOLOGY:  [ ] Reviewed and interpreted by me    Point of Care Ultrasound Findings;    PFT:    EKG: HPI:  74 yo M PMHx HTN, HLD, TATYANA, CKD III and DMT2 who presents with 2 weeks of dizziness and dyspnea with exertion with occasional episodes of bilateral shoulder heaviness. The heaviness was reported to be nonradiating in his shoulders without additional exacerbating factors. Symptoms went away with rest. The patient was seen and evaluated by Dr. Mortensen (cardioliogy) for a nuclear stress test which was completed on 6/25/19 revealing no inducible ischemia. The patient yesterday played golf but again had the same symptoms with exertion. The patient presented for elective left heart cath however preoperative labs showing hemoglobin of 8.4 from 12 (this past May). Patient transferred to medicine for management and workup of anemia prior to any potential ischemic workup. Patient seen at bedside. Denies any dizziness with standing nor dizziness at rest. Denies any blood in the stool, abdominal pain, nausea, vomiting, diarrhea, fever, chills, back pain, hemoptysis, hematemesis. His last C-scope and EGD was 7 years ago and was "normal." He was given an EGD because of his hemoglobin of 12.      Past Medical History:  Abdominal hernia    Diabetes    HLD (hyperlipidemia)    HTN (hypertension)    Mass  right foot soft tissue mass in August 2013  Mitral regurgitation    TATYANA (obstructive sleep apnea)    Snoring  TATYANA precautions--responds affirmatively to STOP BANG questionnaire--admits to loud snoring and daytime somnolence; witnessed apneic events by wife; age > 50; gender, male  Uncontrolled hypertension  BP ranged 170-250/--asymptomatic. Sent from [facility] office on 8/12/13 to [facility] ER via ambulance.     Past Surgical History:  S/P tonsillectomy    Tooth decay  surgery for tooth extraction.       Social History:  · Marital Status		  · Occupation	retired [employer]	     Substance Use History:  · Substance Use	never used	     Tobacco Usage:  · Tobacco Usage: Never smoker	      Home Medications:   * Patient Currently Takes Medications as of 02-Jul-2019 09:32 documented in Structured Notes  · 	labetalol: Last Dose Taken:  , 400 milligram(s) orally once a day  · 	Norvasc 10 mg oral tablet: Last Dose Taken:  , 1 tab(s) orally once a day  · 	losartan 50 mg oral tablet: Last Dose Taken:  , 1 tab(s) orally once a day  · 	Crestor 5 mg oral tablet: Last Dose Taken:  , 1 tab(s) orally once a day  · 	hydroCHLOROthiazide 25 mg oral tablet: Last Dose Taken:  , 1 tab(s) orally once a day  · 	glipiZIDE 2.5 mg oral tablet, extended release: Last Dose Taken:  , 1 tab(s) orally once a day    [ ] All other systems negative  [ ] Unable to assess ROS because ________    OBJECTIVE:  ICU Vital Signs Last 24 Hrs  T(C): 36.7 (03 Jul 2019 04:40), Max: 37.1 (02 Jul 2019 20:23)  T(F): 98 (03 Jul 2019 04:40), Max: 98.7 (02 Jul 2019 20:23)  HR: 62 (03 Jul 2019 10:26) (51 - 69)  BP: 115/63 (03 Jul 2019 04:40) (115/63 - 150/70)  BP(mean): --  ABP: --  ABP(mean): --  RR: 16 (03 Jul 2019 04:40) (16 - 18)  SpO2: 95% (03 Jul 2019 10:26) (94% - 99%)        07-02 @ 07:01  -  07-03 @ 07:00  --------------------------------------------------------  IN: 360 mL / OUT: 0 mL / NET: 360 mL    07-03 @ 07:01  -  07-03 @ 13:00  --------------------------------------------------------  IN: 360 mL / OUT: 0 mL / NET: 360 mL      CAPILLARY BLOOD GLUCOSE      POCT Blood Glucose.: 152 mg/dL (03 Jul 2019 12:18)      PHYSICAL EXAM:  T(C): 36.7 (07-03-19 @ 04:40), Max: 37.1 (07-02-19 @ 20:23)  HR: 62 (07-03-19 @ 10:26) (51 - 69)  BP: 115/63 (07-03-19 @ 04:40) (115/63 - 150/70)  RR: 16 (07-03-19 @ 04:40) (16 - 18)  SpO2: 95% (07-03-19 @ 10:26) (94% - 99%)    CONSTITUTIONAL: well appearing, well developed, no apparent distress  HEAD: Head atraumatic, normal cephalic shape.  EYES: sclera clear bilaterally, EOMI  CARDIAC: RRR, normal S1/S2, no murmurs  RESPIRATORY: no respiratory distress, normal breath sounds  CHEST: no erythema, warmth, tenderness or crepitus  GASTROINTESTINAL: soft, nontender, bowel sounds present  MUSCULOSKELETAL: normal strength and ROM, no muscle or joint tenderness  NEUROLOGICAL: AAOx3, no focal deficits, full strength   SKIN: normal skin color, no apparent rashes  PSYCH: normal mood/affect    HOSPITAL MEDICATIONS:  MEDICATIONS  (STANDING):  amLODIPine   Tablet 10 milliGRAM(s) Oral daily  atorvastatin 20 milliGRAM(s) Oral at bedtime  hydrochlorothiazide 25 milliGRAM(s) Oral daily  insulin lispro (HumaLOG) corrective regimen sliding scale   SubCutaneous three times a day before meals  labetalol 200 milliGRAM(s) Oral two times a day  losartan 50 milliGRAM(s) Oral daily  sodium chloride 0.9% lock flush 3 milliLiter(s) IV Push every 8 hours    MEDICATIONS  (PRN):      LABS:                        7.2    5.10  )-----------( 155      ( 03 Jul 2019 11:17 )             24.7     07-03    144  |  103  |  27<H>  ----------------------------<  102<H>  4.1   |  29  |  1.52<H>    Ca    9.2      03 Jul 2019 05:34  Phos  2.9     07-03  Mg     2.3     07-03    TPro  6.5  /  Alb  3.9  /  TBili  0.4  /  DBili  x   /  AST  19  /  ALT  14  /  AlkPhos  48  07-03

## 2019-07-04 LAB
ANION GAP SERPL CALC-SCNC: 11 MMOL/L — SIGNIFICANT CHANGE UP (ref 5–17)
APTT BLD: 34.7 SEC — SIGNIFICANT CHANGE UP (ref 27.5–36.3)
BUN SERPL-MCNC: 27 MG/DL — HIGH (ref 7–23)
CALCIUM SERPL-MCNC: 9.1 MG/DL — SIGNIFICANT CHANGE UP (ref 8.4–10.5)
CHLORIDE SERPL-SCNC: 102 MMOL/L — SIGNIFICANT CHANGE UP (ref 96–108)
CO2 SERPL-SCNC: 29 MMOL/L — SIGNIFICANT CHANGE UP (ref 22–31)
CREAT SERPL-MCNC: 1.65 MG/DL — HIGH (ref 0.5–1.3)
GLUCOSE BLDC GLUCOMTR-MCNC: 100 MG/DL — HIGH (ref 70–99)
GLUCOSE BLDC GLUCOMTR-MCNC: 116 MG/DL — HIGH (ref 70–99)
GLUCOSE BLDC GLUCOMTR-MCNC: 118 MG/DL — HIGH (ref 70–99)
GLUCOSE BLDC GLUCOMTR-MCNC: 126 MG/DL — HIGH (ref 70–99)
GLUCOSE SERPL-MCNC: 110 MG/DL — HIGH (ref 70–99)
HAPTOGLOB SERPL-MCNC: 220 MG/DL — HIGH (ref 34–200)
HCT VFR BLD CALC: 26.5 % — LOW (ref 39–50)
HGB BLD-MCNC: 7.6 G/DL — LOW (ref 13–17)
INR BLD: 1.18 RATIO — HIGH (ref 0.88–1.16)
MAGNESIUM SERPL-MCNC: 2.4 MG/DL — SIGNIFICANT CHANGE UP (ref 1.6–2.6)
MCHC RBC-ENTMCNC: 26.8 PG — LOW (ref 27–34)
MCHC RBC-ENTMCNC: 28.7 GM/DL — LOW (ref 32–36)
MCV RBC AUTO: 93.3 FL — SIGNIFICANT CHANGE UP (ref 80–100)
PHOSPHATE SERPL-MCNC: 3.2 MG/DL — SIGNIFICANT CHANGE UP (ref 2.5–4.5)
PLATELET # BLD AUTO: 161 K/UL — SIGNIFICANT CHANGE UP (ref 150–400)
POTASSIUM SERPL-MCNC: 4.1 MMOL/L — SIGNIFICANT CHANGE UP (ref 3.5–5.3)
POTASSIUM SERPL-SCNC: 4.1 MMOL/L — SIGNIFICANT CHANGE UP (ref 3.5–5.3)
PROTHROM AB SERPL-ACNC: 13.5 SEC — HIGH (ref 10–13.1)
RBC # BLD: 2.84 M/UL — LOW (ref 4.2–5.8)
RBC # FLD: 16.5 % — HIGH (ref 10.3–14.5)
SODIUM SERPL-SCNC: 142 MMOL/L — SIGNIFICANT CHANGE UP (ref 135–145)
WBC # BLD: 6.22 K/UL — SIGNIFICANT CHANGE UP (ref 3.8–10.5)
WBC # FLD AUTO: 6.22 K/UL — SIGNIFICANT CHANGE UP (ref 3.8–10.5)

## 2019-07-04 PROCEDURE — 99233 SBSQ HOSP IP/OBS HIGH 50: CPT

## 2019-07-04 PROCEDURE — 99233 SBSQ HOSP IP/OBS HIGH 50: CPT | Mod: GC

## 2019-07-04 RX ORDER — FERROUS SULFATE 325(65) MG
325 TABLET ORAL DAILY
Refills: 0 | Status: DISCONTINUED | OUTPATIENT
Start: 2019-07-04 | End: 2019-07-05

## 2019-07-04 RX ADMIN — Medication 200 MILLIGRAM(S): at 18:57

## 2019-07-04 RX ADMIN — SODIUM CHLORIDE 3 MILLILITER(S): 9 INJECTION INTRAMUSCULAR; INTRAVENOUS; SUBCUTANEOUS at 05:50

## 2019-07-04 RX ADMIN — SODIUM CHLORIDE 3 MILLILITER(S): 9 INJECTION INTRAMUSCULAR; INTRAVENOUS; SUBCUTANEOUS at 21:21

## 2019-07-04 RX ADMIN — Medication 25 MILLIGRAM(S): at 05:55

## 2019-07-04 RX ADMIN — AMLODIPINE BESYLATE 10 MILLIGRAM(S): 2.5 TABLET ORAL at 05:55

## 2019-07-04 RX ADMIN — LOSARTAN POTASSIUM 50 MILLIGRAM(S): 100 TABLET, FILM COATED ORAL at 05:55

## 2019-07-04 RX ADMIN — Medication 325 MILLIGRAM(S): at 18:59

## 2019-07-04 RX ADMIN — SODIUM CHLORIDE 3 MILLILITER(S): 9 INJECTION INTRAMUSCULAR; INTRAVENOUS; SUBCUTANEOUS at 13:57

## 2019-07-04 RX ADMIN — ATORVASTATIN CALCIUM 20 MILLIGRAM(S): 80 TABLET, FILM COATED ORAL at 21:31

## 2019-07-04 NOTE — PROGRESS NOTE ADULT - ASSESSMENT
73M never smoker, former  with DM2, HTN, HLD and TATYANA on CPAP 13cm H20 presents with symptomatic anemia and incidentally found radiographic evidence of interstitial lung disease of unknown etiology without hypoxia, cough, dyspnea.    - CT image reviewed yesterday and findings of mild basilar predominant ILD noted. Official read also noted and lymph node will be followed as outpatient.  - He has history of exposure as a  without any clinical signs of pulmonary compromise or rheumatologic disease.  - While in the hospital, please obtain serologies for ELOISA, SRIKANTH, Scleroderma and Sjogren's antibodies, CPK, ANCA, RF, ESR.  - ILD will be managed as outpatient with he will need PFT's as outpatient too.  - We recommend imaging of the abdomen given Iron deficiency anemia although FOBT is negative. He will need a colonoscopy as part of work up. He denies any GI symptoms. Consider checking for celiac disease.

## 2019-07-04 NOTE — PROGRESS NOTE ADULT - SUBJECTIVE AND OBJECTIVE BOX
CHIEF COMPLAINT: dyspnea with exertion    Interval Events: none. Patient denies any dyspnea with rest or ambulating normally on level ground.    REVIEW OF SYSTEMS:  Constitutional: [x ] negative [ ] fevers [ ] chills [ ] weight loss [ ] weight gain  HEENT: [ x] negative [ ] dry eyes [ ] eye irritation [ ] postnasal drip [ ] nasal congestion  CV: [x ] negative  [ ] chest pain [ ] orthopnea [ ] palpitations [ ] murmur  Resp: [ x] negative [ ] cough [ ] shortness of breath [ ] dyspnea [ ] wheezing [ ] sputum [ ] hemoptysis  GI: [x ] negative [ ] nausea [ ] vomiting [ ] diarrhea [ ] constipation [ ] abd pain [ ] dysphagia   : [x ] negative [ ] dysuria [ ] nocturia [ ] hematuria [ ] increased urinary frequency  Musculoskeletal: [x ] negative [ ] back pain [ ] myalgias [ ] arthralgias [ ] fracture  Skin: [x ] negative [ ] rash [ ] itch  Neurological: [ x] negative [ ] headache [ ] dizziness [ ] syncope [ ] weakness [ ] numbness  Psychiatric: [x ] negative [ ] anxiety [ ] depression  Endocrine: [ ] negative [ x] diabetes [ ] thyroid problem  Hematologic/Lymphatic: [ ] negative [ ] anemia [ ] bleeding problem  Allergic/Immunologic: [ ] negative [ ] itchy eyes [ ] nasal discharge [ ] hives [ ] angioedema  [ ] All other systems negative  [ ] Unable to assess ROS because ________    OBJECTIVE:  T(C): 36.9 (04 Jul 2019 04:39), Max: 37.2 (03 Jul 2019 20:18)  T(F): 98.5 (04 Jul 2019 04:39), Max: 98.9 (03 Jul 2019 20:18)  HR: 49 (04 Jul 2019 04:39) (49 - 60)  BP: 112/65 (04 Jul 2019 04:39) (112/65 - 134/69)  RR: 16 (04 Jul 2019 04:39) (16 - 16)  SpO2: 98% (04 Jul 2019 04:39) (93% - 100%)        07-03 @ 07:01  -  07-04 @ 07:00  --------------------------------------------------------  IN: 780 mL / OUT: 1 mL / NET: 779 mL      CAPILLARY BLOOD GLUCOSE      POCT Blood Glucose.: 126 mg/dL (04 Jul 2019 07:42)      PHYSICAL EXAM:  General: NAD  HEENT: SONG no conjunctival paleness  Lymph Nodes:  Neck: No JVD  Respiratory: CTA b/l no crackles, rales or wheezes.  Cardiovascular: RRR no m/r/g  Abdomen: S/NT/ND  Extremities: -C/C/E  Skin: No rash  Neurological: non-focal  Psychiatry: oriented x 3.      HOSPITAL MEDICATIONS:    amLODIPine   Tablet 10 milliGRAM(s) Oral daily  hydrochlorothiazide 25 milliGRAM(s) Oral daily  labetalol 200 milliGRAM(s) Oral two times a day  losartan 50 milliGRAM(s) Oral daily  atorvastatin 20 milliGRAM(s) Oral at bedtime  insulin lispro (HumaLOG) corrective regimen sliding scale   SubCutaneous three times a day before meals  sodium chloride 0.9% lock flush 3 milliLiter(s) IV Push every 8 hours        LABS:                        7.6    6.22  )-----------( 161      ( 04 Jul 2019 10:04 )             26.5     Hgb Trend: 7.6<--, 8.2<--, 7.2<--, 8.3<--, 8.4<--  07-04    142  |  102  |  27<H>  ----------------------------<  110<H>  4.1   |  29  |  1.65<H>    Ca    9.1      04 Jul 2019 05:25  Phos  3.2     07-04  Mg     2.4     07-04    TPro  6.5  /  Alb  3.9  /  TBili  0.4  /  DBili  x   /  AST  19  /  ALT  14  /  AlkPhos  48  07-03    Creatinine Trend: 1.65<--, 1.52<--, 1.62<--  PT/INR - ( 04 Jul 2019 08:53 )   PT: 13.5 sec;   INR: 1.18 ratio     PTT - ( 04 Jul 2019 08:53 )  PTT:34.7 sec          MICROBIOLOGY:     RADIOLOGY:  [x ] Reviewed and interpreted by me  CT chest: .  Mild basilar predominant peripheral reticulations, pulmonary   ossification and minimal tractional bronchiectasis representing   interstitial lung disease of undetermined etiology.  1.3 cm short axis subcarinal lymph node (series 3 image 84).   Recommend follow-up chest CT in 3 months to determine stability.    PULMONARY FUNCTION TESTS:    EKG:

## 2019-07-04 NOTE — PATIENT PROFILE ADULT - DOES PATIENT HAVE ADVANCE DIRECTIVE
treatment You were diagnosed with hemorrhoids, which are dilated blood vessels in your anus and rectum that can sometimes bleed.  These can sometimes result from constipation and the straining that goes along with it.  Avoid spending too much time on the toilet, please east a high fiber diet, and you can take stool softeners and laxatives to prevent further episodes of constipation.  Please schedule an appointment with a gastroenterologist within 1-2 weeks of discharge in order to have these evaluated.  IF you do not have a GI doc, a number for one has been provided for you.  If you experience worsening uncontrollable bleeding, abdominal pain, black tarry stools, vomiting of blood, dizziness, lightheadedness, loss of conscioussness seek medical attention. NPH is a condition where you have enlarged ventricles in your brain.  Depending on the severity, the symptoms from this include changes in mental status, frequent urination, and unsteady gait.  Depending on the extent of the hydrocephalus, sometimes treatment is suggested in order to drain the excess fluid in your brain.  Please follow up with your neurologist within 1-2 weeks of discharge.  A number for a neurosurgeon specializing in this condition has also been provided for you.  IF you experience any neurological changes, trouble walking, altered mental status, worsening confusion, seek medical attention. no

## 2019-07-04 NOTE — PROGRESS NOTE ADULT - ASSESSMENT
72 y/o/m with pmhx of  HTN, HLD, TATYANA, CKD III and DMII admitted 7/2 w/ cc of dizziness / dyspnea with exertion with occasional episodes of bilateral shoulder heaviness. Concerning for symptomatic anemia    #  Normocytic anemia  - iron and ferritin levels low. Suspect PHI. Although TIBC also low. Will start ferrous sulfate 325 qd  - patient in the past anemic -  as per wife follows up with Dr. Heredia, it was never discovered why the patient was anemic  - Patient with Hgb of 12 in May - trended down to 8s  - calling heme eval today- [facility]  -check CT A/P  - GI consult emailed 5pm  -may need transfusion. cont to trend cbc. transfuse if pt < 7 or if remains symptomatic   - Patient's last screening colonoscopy was 7 years ago - as per patient it was normal. Cannot remember who performed it.     # Dyspnea on exertion  - Suspect secondary to anemia. Less likely cardiac in nature given negative nuclear stress test. EKG nonischemic. PNA unlikely given lack of cough, fever, and PE signs. VTE also unlikely given lack of LE edema, tachycardia, and risk factors.  - CT: interstitial lung disease of undetermined etiology. 1.3 cm short axis subcarinal lymph node (series 3 image 84).   Recommend follow-up chest CT in 3 months to determine stability.  - On RA.   - Continue to monitor.   -pulm recs appreciated    # Dizziness  - Suspect secondary to anemia. Less likely cardiac in nature given negative nuclear stress test. Orthostatics negative. Exam nonfocal making stroke less likely.   - Anemia workup as above.  - Once workup complete, revisit need for ischemic workup with cardiology.     # Abnormal chest XRAY  - CT: interstitial lung disease of undetermined etiology. 1.3 cm short axis subcarinal lymph node (series 3 image 84).   Recommend follow-up chest CT in 3 months to determine stability.  - pulmonary evaluation appreciated. will follow outpatient.    # CKD (chronic kidney disease) stage 3, GFR 30-59 ml/min  - BUN/Cr ratio suggestive of chronic process. Unknown baseline.   - Scr 1.62-> 1.52-->1.65    # HTN (hypertension)  -  Continue home medications.     # Type 2 diabetes mellitus  -  LSSI  - Hgab1c  - consistent carb / dash diet.     #  discussed care with pts wife at bedside. holding dvt ppx for now given significant anemia

## 2019-07-04 NOTE — PROGRESS NOTE ADULT - SUBJECTIVE AND OBJECTIVE BOX
Patient is a 73y old  Male who presents with a chief complaint of symptomatic anemia (04 Jul 2019 11:09)      SUBJECTIVE / OVERNIGHT EVENTS:    no overnight events. continues to feel dizzy and dyspnea on exertion   denies abdominal pain, blood in stool or urine    MEDICATIONS  (STANDING):  amLODIPine   Tablet 10 milliGRAM(s) Oral daily  atorvastatin 20 milliGRAM(s) Oral at bedtime  ferrous    sulfate 325 milliGRAM(s) Oral daily  hydrochlorothiazide 25 milliGRAM(s) Oral daily  insulin lispro (HumaLOG) corrective regimen sliding scale   SubCutaneous three times a day before meals  labetalol 200 milliGRAM(s) Oral two times a day  losartan 50 milliGRAM(s) Oral daily  sodium chloride 0.9% lock flush 3 milliLiter(s) IV Push every 8 hours    MEDICATIONS  (PRN):        CAPILLARY BLOOD GLUCOSE      POCT Blood Glucose.: 118 mg/dL (04 Jul 2019 12:06)  POCT Blood Glucose.: 126 mg/dL (04 Jul 2019 07:42)  POCT Blood Glucose.: 122 mg/dL (03 Jul 2019 21:39)    I&O's Summary    03 Jul 2019 07:01  -  04 Jul 2019 07:00  --------------------------------------------------------  IN: 780 mL / OUT: 1 mL / NET: 779 mL    04 Jul 2019 07:01  -  04 Jul 2019 17:39  --------------------------------------------------------  IN: 720 mL / OUT: 0 mL / NET: 720 mL      T 98.5, P 49, /65, R 16, O2 98% RA  PHYSICAL EXAM:  HEENT:  Head is normocephalic    Skin:  pale  NECK:  Supple without lymphadenopathy.   HEART:  Regular rate and rhythm. normal S1 and S2, No M/R/G  LUNGS:  Good ins/exp effort, no W/R/R/C  ABDOMEN:  Soft, nontender, nondistended with good bowel sounds heard  EXTREMITIES:  Without cyanosis, clubbing or edema.   NEUROLOGICAL:  Gross nonfocal       LABS:                        7.6    6.22  )-----------( 161      ( 04 Jul 2019 10:04 )             26.5     07-04    142  |  102  |  27<H>  ----------------------------<  110<H>  4.1   |  29  |  1.65<H>    Ca    9.1      04 Jul 2019 05:25  Phos  3.2     07-04  Mg     2.4     07-04    TPro  6.5  /  Alb  3.9  /  TBili  0.4  /  DBili  x   /  AST  19  /  ALT  14  /  AlkPhos  48  07-03    PT/INR - ( 04 Jul 2019 08:53 )   PT: 13.5 sec;   INR: 1.18 ratio         PTT - ( 04 Jul 2019 08:53 )  PTT:34.7 sec          RADIOLOGY & ADDITIONAL TESTS:    Imaging Personally Reviewed:    Consultant(s) Notes Reviewed:  pulm    Care Discussed with Consultants/Other Providers: medicine NP

## 2019-07-05 LAB
ANION GAP SERPL CALC-SCNC: 15 MMOL/L — SIGNIFICANT CHANGE UP (ref 5–17)
BUN SERPL-MCNC: 26 MG/DL — HIGH (ref 7–23)
CALCIUM SERPL-MCNC: 9.1 MG/DL — SIGNIFICANT CHANGE UP (ref 8.4–10.5)
CHLORIDE SERPL-SCNC: 100 MMOL/L — SIGNIFICANT CHANGE UP (ref 96–108)
CO2 SERPL-SCNC: 26 MMOL/L — SIGNIFICANT CHANGE UP (ref 22–31)
CREAT SERPL-MCNC: 1.65 MG/DL — HIGH (ref 0.5–1.3)
GLUCOSE BLDC GLUCOMTR-MCNC: 112 MG/DL — HIGH (ref 70–99)
GLUCOSE BLDC GLUCOMTR-MCNC: 127 MG/DL — HIGH (ref 70–99)
GLUCOSE BLDC GLUCOMTR-MCNC: 136 MG/DL — HIGH (ref 70–99)
GLUCOSE BLDC GLUCOMTR-MCNC: 137 MG/DL — HIGH (ref 70–99)
GLUCOSE SERPL-MCNC: 116 MG/DL — HIGH (ref 70–99)
HCT VFR BLD CALC: 27.2 % — LOW (ref 39–50)
HGB BLD-MCNC: 8.1 G/DL — LOW (ref 13–17)
MCHC RBC-ENTMCNC: 27.1 PG — SIGNIFICANT CHANGE UP (ref 27–34)
MCHC RBC-ENTMCNC: 29.8 GM/DL — LOW (ref 32–36)
MCV RBC AUTO: 91 FL — SIGNIFICANT CHANGE UP (ref 80–100)
PLATELET # BLD AUTO: 163 K/UL — SIGNIFICANT CHANGE UP (ref 150–400)
POTASSIUM SERPL-MCNC: 3.9 MMOL/L — SIGNIFICANT CHANGE UP (ref 3.5–5.3)
POTASSIUM SERPL-SCNC: 3.9 MMOL/L — SIGNIFICANT CHANGE UP (ref 3.5–5.3)
RBC # BLD: 2.99 M/UL — LOW (ref 4.2–5.8)
RBC # FLD: 16.5 % — HIGH (ref 10.3–14.5)
SODIUM SERPL-SCNC: 141 MMOL/L — SIGNIFICANT CHANGE UP (ref 135–145)
WBC # BLD: 6.6 K/UL — SIGNIFICANT CHANGE UP (ref 3.8–10.5)
WBC # FLD AUTO: 6.6 K/UL — SIGNIFICANT CHANGE UP (ref 3.8–10.5)

## 2019-07-05 PROCEDURE — 74176 CT ABD & PELVIS W/O CONTRAST: CPT | Mod: 26

## 2019-07-05 PROCEDURE — 99223 1ST HOSP IP/OBS HIGH 75: CPT | Mod: GC

## 2019-07-05 PROCEDURE — 99232 SBSQ HOSP IP/OBS MODERATE 35: CPT

## 2019-07-05 PROCEDURE — 99233 SBSQ HOSP IP/OBS HIGH 50: CPT | Mod: GC

## 2019-07-05 RX ORDER — IRON SUCROSE 20 MG/ML
200 INJECTION, SOLUTION INTRAVENOUS
Refills: 0 | Status: DISCONTINUED | OUTPATIENT
Start: 2019-07-05 | End: 2019-07-05

## 2019-07-05 RX ORDER — IRON SUCROSE 20 MG/ML
200 INJECTION, SOLUTION INTRAVENOUS EVERY 24 HOURS
Refills: 0 | Status: DISCONTINUED | OUTPATIENT
Start: 2019-07-05 | End: 2019-07-10

## 2019-07-05 RX ADMIN — AMLODIPINE BESYLATE 10 MILLIGRAM(S): 2.5 TABLET ORAL at 05:43

## 2019-07-05 RX ADMIN — LOSARTAN POTASSIUM 50 MILLIGRAM(S): 100 TABLET, FILM COATED ORAL at 05:43

## 2019-07-05 RX ADMIN — SODIUM CHLORIDE 3 MILLILITER(S): 9 INJECTION INTRAMUSCULAR; INTRAVENOUS; SUBCUTANEOUS at 13:56

## 2019-07-05 RX ADMIN — Medication 25 MILLIGRAM(S): at 05:43

## 2019-07-05 RX ADMIN — SODIUM CHLORIDE 3 MILLILITER(S): 9 INJECTION INTRAMUSCULAR; INTRAVENOUS; SUBCUTANEOUS at 21:54

## 2019-07-05 RX ADMIN — Medication 200 MILLIGRAM(S): at 18:52

## 2019-07-05 RX ADMIN — ATORVASTATIN CALCIUM 20 MILLIGRAM(S): 80 TABLET, FILM COATED ORAL at 21:55

## 2019-07-05 RX ADMIN — SODIUM CHLORIDE 3 MILLILITER(S): 9 INJECTION INTRAMUSCULAR; INTRAVENOUS; SUBCUTANEOUS at 05:38

## 2019-07-05 RX ADMIN — IRON SUCROSE 110 MILLIGRAM(S): 20 INJECTION, SOLUTION INTRAVENOUS at 12:27

## 2019-07-05 NOTE — PROGRESS NOTE ADULT - SUBJECTIVE AND OBJECTIVE BOX
Patient is a 73y old  Male who presents with a chief complaint of symptomatic anemia (04 Jul 2019 11:09)      SUBJECTIVE / OVERNIGHT EVENTS:  No overnight events. Hemodynamically stable. Denies any HA, CP, SOB.       PHYSICAL EXAM:  ICU Vital Signs Last 24 Hrs  T(C): 36.9 (05 Jul 2019 05:35), Max: 37.2 (04 Jul 2019 20:57)  T(F): 98.5 (05 Jul 2019 05:35), Max: 98.9 (04 Jul 2019 20:57)  HR: 50 (05 Jul 2019 05:35) (50 - 68)  BP: 112/60 (05 Jul 2019 05:35) (112/60 - 131/64)  RR: 18 (05 Jul 2019 05:35) (16 - 18)  SpO2: 99% (05 Jul 2019 05:35) (95% - 99%)    HEENT:  Head is normocephalic    Skin:  pale  NECK:  Supple without lymphadenopathy.   HEART:  Regular rate and rhythm. normal S1 and S2, No M/R/G  LUNGS:  Good ins/exp effort, no W/R/R/C  ABDOMEN:  Soft, nontender, nondistended with good bowel sounds heard  EXTREMITIES:  Without cyanosis, clubbing or edema.   NEUROLOGICAL:  Gross nonfocal       LABS:      CBC Full  -  ( 04 Jul 2019 10:04 )  WBC Count : 6.22 K/uL  RBC Count : 2.84 M/uL  Hemoglobin : 7.6 g/dL  Hematocrit : 26.5 %  Platelet Count - Automated : 161 K/uL    PT/INR - ( 04 Jul 2019 08:53 )   PT: 13.5 sec;   INR: 1.18 ratio         PTT - ( 04 Jul 2019 08:53 )  PTT:34.7 sec    141  |  100  |  26<H>  ----------------------------<  116<H>  3.9   |  26  |  1.65<H>    Ca    9.1      05 Jul 2019 06:33  Phos  3.2     07-04  Mg     2.4     07-04 Patient is a 73y old  Male who presents with a chief complaint of symptomatic anemia (04 Jul 2019 11:09)      SUBJECTIVE / OVERNIGHT EVENTS:  No overnight events. Hemodynamically stable. Denies any HA, CP, SOB. Patient is pending endo /  colonoscopy for monday (patient is willing to stay in the hospital).       PHYSICAL EXAM:  ICU Vital Signs Last 24 Hrs  T(C): 36.9 (05 Jul 2019 05:35), Max: 37.2 (04 Jul 2019 20:57)  T(F): 98.5 (05 Jul 2019 05:35), Max: 98.9 (04 Jul 2019 20:57)  HR: 50 (05 Jul 2019 05:35) (50 - 68)  BP: 112/60 (05 Jul 2019 05:35) (112/60 - 131/64)  RR: 18 (05 Jul 2019 05:35) (16 - 18)  SpO2: 99% (05 Jul 2019 05:35) (95% - 99%)    HEENT:  Head is normocephalic    Skin:  pale  NECK:  Supple without lymphadenopathy.   HEART:  Regular rate and rhythm. normal S1 and S2, No M/R/G  LUNGS:  Good ins/exp effort, no W/R/R/C  ABDOMEN:  Soft, nontender, nondistended with good bowel sounds heard  EXTREMITIES:  Without cyanosis, clubbing or edema.   NEUROLOGICAL:  Gross nonfocal       LABS:      CBC Full  -  ( 05 Jul 2019 09:09 )  WBC Count : 6.60 K/uL  RBC Count : 2.99 M/uL  Hemoglobin : 8.1 g/dL  Hematocrit : 27.2 %  Platelet Count - Automated : 163 K/uL    PT/INR - ( 04 Jul 2019 08:53 )   PT: 13.5 sec;   INR: 1.18 ratio       PTT - ( 04 Jul 2019 08:53 )  PTT:34.7 sec    141  |  100  |  26<H>  ----------------------------<  116<H>  3.9   |  26  |  1.65<H>    Ca    9.1      05 Jul 2019 06:33  Phos  3.2     07-04  Mg     2.4     07-04

## 2019-07-05 NOTE — CONSULT NOTE ADULT - SUBJECTIVE AND OBJECTIVE BOX
Chief Complaint:  Patient is a 73y old  Male who presents with a chief complaint of symptomatic anemia (2019 17:38)      HPI:    74yo M PMH HTN, HLD, TATYANA, CKD3, DM2, presents with 2 weeks of dizziness and LAUREN with went away with rest. Pt was evaluated by Dr Mortensen (cardiology) for which a nuclear stress test was done that was normal, patient was scheduled for an angiogram for further evaluation of his symptoms, had pre-surgical blood work that revealed a new anemia with hgb in the 7s (reportedly had hgb int he 12s 2 months prior). Pt denies melena, hematochezia, nausea, vomiting, abdominal pain, fever, chills, dysphagia, odynophagia, decreased PO intake, weight loss. Pt may have noticed very dark brown stool for a few weeks but not black. Pt denies taking iron pills. Pt's last EGD and colonoscopy were 8 years ago that were reportedly normal. Pt denies use of ASA or blood thinners. In the ED, pt was found to have a hgb of 7.6.     Allergies:  No Known Allergies      Home Medications:    Hospital Medications:  amLODIPine   Tablet 10 milliGRAM(s) Oral daily  atorvastatin 20 milliGRAM(s) Oral at bedtime  ferrous    sulfate 325 milliGRAM(s) Oral daily  hydrochlorothiazide 25 milliGRAM(s) Oral daily  insulin lispro (HumaLOG) corrective regimen sliding scale   SubCutaneous three times a day before meals  labetalol 200 milliGRAM(s) Oral two times a day  losartan 50 milliGRAM(s) Oral daily  sodium chloride 0.9% lock flush 3 milliLiter(s) IV Push every 8 hours      PMHX/PSHX:  TATYANA (obstructive sleep apnea)  Mitral regurgitation  Diabetes  HLD (hyperlipidemia)  HTN (hypertension)  HTN (hypertension)  Abdominal hernia  Uncontrolled hypertension  Snoring  Mass  Tooth decay  S/P tonsillectomy      Family history:      There is no family history of peptic ulcer disease, gastric cancer, colon polyps, colon cancer, celiac disease, biliary, hepatic, or pancreatic disease.  None of the female relatives have breast, uterine, or ovarian cancer.     Social History:     ROS:     General:  No wt loss, fevers, chills, night sweats, fatigue,   Eyes:  Good vision, no reported pain  ENT:  No sore throat, pain, runny nose, dysphagia  CV:  No pain, palpitations, hypo/hypertension  Resp:  No dyspnea, cough, tachypnea, wheezing  GI:  See HPI   :  No pain, bleeding, incontinence, nocturia  Muscle:  No pain, weakness  Neuro:  No weakness, tingling, memory problems  Psych:  No fatigue, insomnia, mood problems, depression  Endocrine:  No polyuria, polydipsia, cold/heat intolerance  Heme:  No petechiae, ecchymosis, easy bruisability  Skin:  No rash, tattoos, scars, edema      PHYSICAL EXAM:     GENERAL:  Appears stated age, well-groomed  HEENT:  NC/AT,  conjunctivae clear and pink  CHEST:  Full & symmetric excursion, no increased effort, breath sounds clear  HEART:  Regular rhythm, S1, S2, no murmur/rub/S3/S4  ABDOMEN:  Soft, non-tender, non-distended, normoactive bowel sounds  EXTEREMITIES:  no cyanosis,clubbing or edema  SKIN:  No rash/erythema/ecchymoses  NEURO:  Alert, oriented, no asterixis  RECTAL: Brown stool on exam    Vital Signs:  Vital Signs Last 24 Hrs  T(C): 36.9 (2019 05:35), Max: 37.2 (2019 20:57)  T(F): 98.5 (2019 05:35), Max: 98.9 (2019 20:57)  HR: 50 (2019 05:35) (50 - 68)  BP: 112/60 (2019 05:35) (112/60 - 131/64)  BP(mean): --  RR: 18 (2019 05:35) (16 - 18)  SpO2: 99% (2019 05:35) (95% - 99%)  Daily     Daily Weight in k.3 (2019 09:30)    LABS:                        7.6    6.22  )-----------( 161      ( 2019 10:04 )             26.5     Mean Cell Volume: 93.3 fl (- @ 10:04)        141  |  100  |  26<H>  ----------------------------<  116<H>  3.9   |  26  |  1.65<H>    Ca    9.1      2019 06:33  Phos  3.2     07-04  Mg     2.4     07-04        PT/INR - ( 2019 08:53 )   PT: 13.5 sec;   INR: 1.18 ratio         PTT - ( 2019 08:53 )  PTT:34.7 sec                            7.6    6.22  )-----------( 161      ( 2019 10:04 )             26.5                         8.2    4.8   )-----------( 161      ( 2019 13:47 )             25.0                         7.2    5.10  )-----------( 155      ( 2019 11:17 )             24.7                         8.3    4.6   )-----------( 155      ( 2019 10:13 )             24.7                         8.4    4.7   )-----------( 158      ( 2019 09:32 )             25.0     Imaging:

## 2019-07-05 NOTE — CONSULT NOTE ADULT - ASSESSMENT
7/5/19 the p over the last few weeks has been weak dizzy and easily fatigued. He went to see his pmd and the hemoglobin was noted to be in the 8 range where a few months earlier it was 12. He has been under the care of Dr Marc Mai and his hemoglobin was always about 12. He may have been experiencing dark stools over the last few weeks and has not been taking iron pills. The guaiac here was negative for blood. The pt was seen by pulmonary here for interstitial changes in the lungs and will have a ct scan to see if further studies are needed. He was a former . The GFR was 41 and bun was 26 and the white cell count was 6.6 hemoglobin 8.1 and MCV of 91 and MCHC of 29.8 and rdw of 16.5 . The ferritin was only 17 and this suggests iron deficiency anemia and he is to have a EGD and colonoscopy and I will give him iv iron to see if there is a response and he can follow with Dr Mai from heme onc.

## 2019-07-05 NOTE — CONSULT NOTE ADULT - ASSESSMENT
Impression:  1) Anemia without overt bleeding- Pt with iron sat of 13% consistent with iron deficiency anemia. Pt could have occult bleeding from PUD, malignancy (stomach, small bowel, colon), celiac disease. Other causes of normocytic anemia includes bone marrow suppression, hypothyroidism etc  2) Moderate chest discomfort associated with dizziness, dyspnea and lightheadedness with moderate exertion -currently undergoing work up with cardiology    Recommendations:  -Would benefit from EGD/colonoscopy to evaluate colonic/g Impression:  1) Anemia without overt bleeding- Pt with iron sat of 13% consistent with iron deficiency anemia. Pt could have occult bleeding from PUD, malignancy (stomach, small bowel, colon), celiac disease. Other causes of normocytic anemia includes bone marrow suppression, hypothyroidism etc  2) Moderate chest discomfort associated with dizziness, dyspnea and lightheadedness with moderate exertion -currently undergoing work up with cardiology    Recommendations:  -Would benefit from EGD/colonoscopy to evaluate source of anemia   -Would plan for procedure Monday; Pt expressed wishes to do it as outpatient if possible, would continue to watch blood counts for now, if it continues to go down, pt would most likely benefit from inpatient endoscopic evaluation  -Recommend hematology evaluation for anemia  -Recommend cardiology's risk stratification and optimization prior to procedure  -Rest of care per primary team  -Monitor CBC and BMs, transfuse to Hgb of 7 Impression:  1) Anemia without overt bleeding- Pt with iron sat of 13% consistent with iron deficiency anemia. Pt could have occult bleeding from PUD, malignancy (stomach, small bowel, colon), celiac disease. Other causes of normocytic anemia includes bone marrow suppression, hypothyroidism etc  2) Moderate chest discomfort associated with dizziness, dyspnea and lightheadedness with moderate exertion -currently undergoing work up with cardiology    Recommendations:  -Would benefit from EGD/colonoscopy to evaluate source of anemia   -Would plan for procedure Monday if patient is agreeable   -Would continue to watch blood counts for now, transfuse as needed; if it continues to go down, pt should certainly have inpatient endoscopic evaluation  -Recommend cardiology's risk stratification and optimization prior to procedure  -Rest of care per primary team  -Monitor CBC and BMs, transfuse to Hgb of 7  -Can have regular diet today, we will prep for endoscopic evaluation starting Sunday.

## 2019-07-05 NOTE — PROGRESS NOTE ADULT - ASSESSMENT
73M never smoker, former  with DM2, HTN, HLD and TATYANA on CPAP 13cm H20 presents with symptomatic iron deficiency anemia and incidentally found radiographic evidence of interstitial lung disease of unknown etiology without hypoxia, cough, dyspnea.    - CT image reviewed yesterday and findings of mild basilar predominant ILD noted. Official read also noted and lymph node which will be followed as outpatient.  - He has history of exposure as a  without any clinical signs of pulmonary compromise or rheumatologic disease.  - While in the hospital, please obtain serologies for ELOISA, SRIKANTH, Scleroderma and Sjogren's antibodies, CPK, ANCA, RF, ESR.  - ILD will be managed as outpatient as he will need PFT's as outpatient too. He may follow up with Dr. Sandhya Stapleton as outpatient.  - EGD/Colonoscopy to be planned.  - Patient is a low risk candidate for a low risk procedure and has a 1.6% risk of pulmonary complications by ARISCAT scoring.  - If patient somnolent in PACU post procedure, please use CPAP at 13cm H20.

## 2019-07-05 NOTE — PROGRESS NOTE ADULT - ASSESSMENT
72 y/o/m with pmhx of  HTN, HLD, TATYANA, CKD III and DMII admitted 7/2 w/ cc of dizziness / dyspnea with exertion with occasional episodes of bilateral shoulder heaviness. Concerning for symptomatic anemia    #  Normocytic anemia  - Suspect PHI /  Although TIBC also low  - ferrous sulfate 325 qd  - Patient with Hgb of 12 in May - trended down to 8s  - Check CT A/P  - may need transfusion. cont to trend cbc. transfuse if pt < 7 or if remains symptomatic   - pending rheumatology workup  - hematology evaluation pending /patient in the past anemic -  as per wife follows up with Dr. Heredia, it was never discovered why the patient was anemic  - Cardiology evaluation pending for risks stratification for EGD/colonoscopy  - GI consult: Would benefit from EGD/colonoscopy to evaluate source of anemia, would plan for procedure Monday    # Dyspnea on exertion  - Suspect secondary to anemia. Less likely cardiac in nature given negative nuclear stress test. EKG nonischemic. PNA unlikely given lack of cough, fever, and PE signs. VTE also unlikely given lack of LE edema, tachycardia, and risk factors.  - CT: interstitial lung disease of undetermined etiology. 1.3 cm short axis subcarinal lymph node (series 3 image 84).   Recommend follow-up chest CT in 3 months to determine stability.  - On RA.   - Continue to monitor.   -pulm recs appreciated    # Dizziness  - Suspect secondary to anemia. Less likely cardiac in nature given negative nuclear stress test. Orthostatics negative. Exam nonfocal making stroke less likely.   - Anemia workup as above.  - Once workup complete, revisit need for ischemic workup with cardiology.     # Abnormal chest XRAY  - CT: interstitial lung disease of undetermined etiology. 1.3 cm short axis subcarinal lymph node (series 3 image 84).   Recommend follow-up chest CT in 3 months to determine stability.  - pulmonary evaluation appreciated. will follow outpatient.    # CKD (chronic kidney disease) stage 3, GFR 30-59 ml/min  - BUN/Cr ratio suggestive of chronic process. Unknown baseline.   - Scr 1.62-> 1.52-->1.65    # HTN (hypertension)  -  Continue home medications.     # Type 2 diabetes mellitus  -  LSSI  - Hgab1c  - consistent carb / dash diet.     #  discussed care with pts wife at bedside. holding dvt ppx for now given significant anemia 74 y/o/m with pmhx of  HTN, HLD, TATYANA, CKD III and DMII admitted 7/2 w/ cc of dizziness / dyspnea with exertion with occasional episodes of bilateral shoulder heaviness. Concerning for symptomatic anemia    #  Normocytic anemia  - Suspect PHI /  Although TIBC also low  - change po iron to IV Venofer  - Patient with Hgb of 12 in May - trended down to 8s  - Check CT A/P  - may need transfusion. cont to trend cbc. transfuse if pt < 7 or if remains symptomatic   - pending rheumatology workup  - hematology evaluation pending /patient in the past anemic -  as per wife follows up with Dr. Heredia, it was never discovered why the patient was anemic  - Cardiology evaluation pending for risks stratification for EGD/colonoscopy  - GI consult: Would benefit from EGD/colonoscopy to evaluate source of anemia, would plan for procedure Monday    # Dyspnea on exertion  - Suspect secondary to anemia. Less likely cardiac in nature given negative nuclear stress test. EKG nonischemic. PNA unlikely given lack of cough, fever, and PE signs. VTE also unlikely given lack of LE edema, tachycardia, and risk factors.  - CT: interstitial lung disease of undetermined etiology. 1.3 cm short axis subcarinal lymph node (series 3 image 84).   Recommend follow-up chest CT in 3 months to determine stability.  - On RA.   - Continue to monitor.   -pulm recs appreciated    # Dizziness  - Suspect secondary to anemia. Less likely cardiac in nature given negative nuclear stress test. Orthostatics negative. Exam nonfocal making stroke less likely.   - Anemia workup as above.  - Once workup complete, revisit need for ischemic workup with cardiology.     # Abnormal chest XRAY  - CT: interstitial lung disease of undetermined etiology. 1.3 cm short axis subcarinal lymph node (series 3 image 84).   Recommend follow-up chest CT in 3 months to determine stability.  - pulmonary evaluation appreciated. will follow outpatient.    # CKD (chronic kidney disease) stage 3, GFR 30-59 ml/min  - BUN/Cr ratio suggestive of chronic process. Unknown baseline.   - Scr 1.62-> 1.52-->1.65    # HTN (hypertension)  -  Continue home medications.     # Type 2 diabetes mellitus  -  LSSI  - Hgab1c  - consistent carb / dash diet.     #  Patient and patient's family fully informed in regards of plan of care.

## 2019-07-05 NOTE — PROGRESS NOTE ADULT - SUBJECTIVE AND OBJECTIVE BOX
CHIEF COMPLAINT: dyspnea with exertion    Interval Events: none    REVIEW OF SYSTEMS:  Constitutional: [x ] negative [ ] fevers [ ] chills [ ] weight loss [ ] weight gain  HEENT: [x ] negative [ ] dry eyes [ ] eye irritation [ ] postnasal drip [ ] nasal congestion  CV: [x ] negative  [ ] chest pain [ ] orthopnea [ ] palpitations [ ] murmur  Resp: [x ] negative [ ] cough [ ] shortness of breath [ ] dyspnea [ ] wheezing [ ] sputum [ ] hemoptysis  GI: [x] negative [ ] nausea [ ] vomiting [ ] diarrhea [ ] constipation [ ] abd pain [ ] dysphagia   : [x ] negative [ ] dysuria [ ] nocturia [ ] hematuria [ ] increased urinary frequency  Musculoskeletal: [ x] negative [ ] back pain [ ] myalgias [ ] arthralgias [ ] fracture  Skin: [x ] negative [ ] rash [ ] itch  Neurological: [x ] negative [ ] headache [ ] dizziness [ ] syncope [ ] weakness [ ] numbness  Psychiatric: [ x] negative [ ] anxiety [ ] depression  Endocrine: [x ] negative [ ] diabetes [ ] thyroid problem  Hematologic/Lymphatic: [ ] negative [ ] anemia [ ] bleeding problem  Allergic/Immunologic: [ ] negative [ ] itchy eyes [ ] nasal discharge [ ] hives [ ] angioedema  [ ] All other systems negative  [ ] Unable to assess ROS because ________    OBJECTIVE:  T(C): 36.9 (05 Jul 2019 05:35), Max: 37.2 (04 Jul 2019 20:57)  T(F): 98.5 (05 Jul 2019 05:35), Max: 98.9 (04 Jul 2019 20:57)  HR: 50 (05 Jul 2019 05:35) (50 - 68)  BP: 112/60 (05 Jul 2019 05:35) (112/60 - 131/64)  RR: 18 (05 Jul 2019 05:35) (16 - 18)  SpO2: 99% (05 Jul 2019 05:35) (95% - 99%)        07-04 @ 07:01  -  07-05 @ 07:00  --------------------------------------------------------  IN: 1080 mL / OUT: 0 mL / NET: 1080 mL      CAPILLARY BLOOD GLUCOSE      POCT Blood Glucose.: 116 mg/dL (04 Jul 2019 21:58)      PHYSICAL EXAM:  General: NAD  HEENT: SONG  Lymph Nodes:  Neck: No JVD  Respiratory: CTA b/l  Cardiovascular: RRR no m/r/g  Abdomen: S/NT/ND  Extremities: -c/c/e  Skin: no rash  Neurological: non-focal  Psychiatry:    HOSPITAL MEDICATIONS:      amLODIPine   Tablet 10 milliGRAM(s) Oral daily  hydrochlorothiazide 25 milliGRAM(s) Oral daily  labetalol 200 milliGRAM(s) Oral two times a day  losartan 50 milliGRAM(s) Oral daily  atorvastatin 20 milliGRAM(s) Oral at bedtime  insulin lispro (HumaLOG) corrective regimen sliding scale   SubCutaneous three times a day before meals  ferrous    sulfate 325 milliGRAM(s) Oral daily  sodium chloride 0.9% lock flush 3 milliLiter(s) IV Push every 8 hours            LABS:                        7.6    6.22  )-----------( 161      ( 04 Jul 2019 10:04 )             26.5     Hgb Trend: 7.6<--, 8.2<--, 7.2<--, 8.3<--, 8.4<--  07-05    141  |  100  |  26<H>  ----------------------------<  116<H>  3.9   |  26  |  1.65<H>    Ca    9.1      05 Jul 2019 06:33  Phos  3.2     07-04  Mg     2.4     07-04      Creatinine Trend: 1.65<--, 1.65<--, 1.52<--, 1.62<--  PT/INR - ( 04 Jul 2019 08:53 )   PT: 13.5 sec;   INR: 1.18 ratio         PTT - ( 04 Jul 2019 08:53 )  PTT:34.7 sec          MICROBIOLOGY:     RADIOLOGY:  [ ] Reviewed and interpreted by me    PULMONARY FUNCTION TESTS:    EKG:

## 2019-07-05 NOTE — CONSULT NOTE ADULT - SUBJECTIVE AND OBJECTIVE BOX
Patient is a 73y old  Male who presents with a chief complaint of symptomatic anemia (05 Jul 2019 08:54)      HPI:  74 yo  male (no implantable devices) PMH HTN, HLD, TATYANA, CKD III and DMT2 (a1c June 2019, managed by PCP, uncomplicated) presents for cardiac angiogram. Patient reports occasional episodes of moderate chest discomfort associated with dizziness, dyspnea and lightheadedness with moderate exertion x 2 weeks ago. Seen and evaluated by Dr. Mortensen (cards) recommended to have nuclear stress test which was completed on 6/25/19 revealing normal results, normal EF and wall motion, diaphragmatic attenuation. Symptoms still persist- recommends to have LHC for further ischemic evaluation. (02 Jul 2019 09:24)    7/5/19 the p over the last few weeks has been weak dizzy and easily fatigued. He went to see his pmd and the hemoglobin was noted to be in the 8 range where a few months earlier it was 12. He has been under the care of Dr Marc Mai and his hemoglobin was always about 12. He may have been experiencing dark stools over the last few weeks and has not been taking iron pills. The guaiac here was negative for blood. The pt was seen by pulmonary here for interstitial changes in the lungs and will have a ct scan to see if further studies are needed. He was a former . The GFR was 41 and bun was 26 and the white cell count was 6.6 hemoglobin 8.1 and MCV of 91 and MCHC of 29.8 and rdw of 16.5 . The ferritin was only 17 and this suggests iron deficiency anemia and he is to have a EGD and colonoscopy and I will give him iv iron to see if there is a response and he can follow with Dr Mai from heme onc.      MEDICATIONS  (STANDING):  amLODIPine   Tablet 10 milliGRAM(s) Oral daily  atorvastatin 20 milliGRAM(s) Oral at bedtime  hydrochlorothiazide 25 milliGRAM(s) Oral daily  insulin lispro (HumaLOG) corrective regimen sliding scale   SubCutaneous three times a day before meals  iron sucrose IVPB 200 milliGRAM(s) IV Intermittent <User Schedule>  iron sucrose IVPB 200 milliGRAM(s) IV Intermittent every 24 hours  labetalol 200 milliGRAM(s) Oral two times a day  losartan 50 milliGRAM(s) Oral daily  sodium chloride 0.9% lock flush 3 milliLiter(s) IV Push every 8 hours    MEDICATIONS  (PRN):      T(F): 98.5 (07-05-19 @ 05:35), Max: 98.9 (07-04-19 @ 20:57)  HR: 60 (07-05-19 @ 10:07) (50 - 68)  BP: 112/60 (07-05-19 @ 05:35) (112/60 - 131/64)  RR: 18 (07-05-19 @ 05:35) (16 - 18)  SpO2: 97% (07-05-19 @ 10:07) (95% - 99%)    LABS:    CBC Full  -  ( 05 Jul 2019 09:09 )  WBC Count : 6.60 K/uL  RBC Count : 2.99 M/uL  Hemoglobin : 8.1 g/dL  Hematocrit : 27.2 %  Platelet Count - Automated : 163 K/uL  Mean Cell Volume : 91.0 fl  Mean Cell Hemoglobin : 27.1 pg  Mean Cell Hemoglobin Concentration : 29.8 gm/dL  Auto Neutrophil # : x  Auto Lymphocyte # : x  Auto Monocyte # : x  Auto Eosinophil # : x  Auto Basophil # : x  Auto Neutrophil % : x  Auto Lymphocyte % : x  Auto Monocyte % : x  Auto Eosinophil % : x  Auto Basophil % : x    07-05    141  |  100  |  26<H>  ----------------------------<  116<H>  3.9   |  26  |  1.65<H>    Ca    9.1      05 Jul 2019 06:33  Phos  3.2     07-04  Mg     2.4     07-04      PT/INR - ( 04 Jul 2019 08:53 )   PT: 13.5 sec;   INR: 1.18 ratio         PTT - ( 04 Jul 2019 08:53 )  PTT:34.7 sec      ROS:  Negative except for:    PAST MEDICAL & SURGICAL HISTORY:  TATYANA (obstructive sleep apnea)  Mitral regurgitation  Diabetes  HLD (hyperlipidemia)  HTN (hypertension)  Abdominal hernia  Uncontrolled hypertension: BP ranged 170-250/--asymptomatic. Sent from [facility] office on 8/12/13 to [facility] ER via ambulance  Snoring: TATYANA precautions--responds affirmatively to STOP BANG questionnaire--admits to loud snoring and daytime somnolence; witnessed apneic events by wife; age &gt; 50; gender, male  Mass: right foot soft tissue mass in August 2013  Tooth decay: surgery for tooth extraction  S/P tonsillectomy      SOCIAL HISTORY:    FAMILY HISTORY:      Allergies    No Known Allergies    Intolerances        PHYSICAL EXAM  General: adult in NAD  HEENT: clear oropharynx, anicteric sclera, pink conjunctivae  Neck: supple  CV: normal S1S2 with no murmur rubs or gallops  Lungs: clear to auscultation, no wheezes, no rales  Abdomen: soft non-tender non-distended, no hepatosplenomegaly  Ext: no clubbing cyanosis or edema  Skin: no rashes and no petechiae  Neuro: alert and oriented X3 no focal deficits    BLOOD SMEAR INTERPRETATION:    RADIOLOGY :

## 2019-07-06 LAB
ANION GAP SERPL CALC-SCNC: 13 MMOL/L — SIGNIFICANT CHANGE UP (ref 5–17)
BUN SERPL-MCNC: 30 MG/DL — HIGH (ref 7–23)
CALCIUM SERPL-MCNC: 8.8 MG/DL — SIGNIFICANT CHANGE UP (ref 8.4–10.5)
CHLORIDE SERPL-SCNC: 102 MMOL/L — SIGNIFICANT CHANGE UP (ref 96–108)
CK SERPL-CCNC: 421 U/L — HIGH (ref 30–200)
CO2 SERPL-SCNC: 28 MMOL/L — SIGNIFICANT CHANGE UP (ref 22–31)
CREAT SERPL-MCNC: 1.72 MG/DL — HIGH (ref 0.5–1.3)
ERYTHROCYTE [SEDIMENTATION RATE] IN BLOOD: 36 MM/HR — HIGH (ref 0–20)
GLUCOSE BLDC GLUCOMTR-MCNC: 116 MG/DL — HIGH (ref 70–99)
GLUCOSE BLDC GLUCOMTR-MCNC: 125 MG/DL — HIGH (ref 70–99)
GLUCOSE BLDC GLUCOMTR-MCNC: 142 MG/DL — HIGH (ref 70–99)
GLUCOSE BLDC GLUCOMTR-MCNC: 147 MG/DL — HIGH (ref 70–99)
GLUCOSE SERPL-MCNC: 119 MG/DL — HIGH (ref 70–99)
HCT VFR BLD CALC: 27.7 % — LOW (ref 39–50)
HGB BLD-MCNC: 8.2 G/DL — LOW (ref 13–17)
MCHC RBC-ENTMCNC: 27.1 PG — SIGNIFICANT CHANGE UP (ref 27–34)
MCHC RBC-ENTMCNC: 29.6 GM/DL — LOW (ref 32–36)
MCV RBC AUTO: 91.4 FL — SIGNIFICANT CHANGE UP (ref 80–100)
PLATELET # BLD AUTO: 170 K/UL — SIGNIFICANT CHANGE UP (ref 150–400)
POTASSIUM SERPL-MCNC: 3.8 MMOL/L — SIGNIFICANT CHANGE UP (ref 3.5–5.3)
POTASSIUM SERPL-SCNC: 3.8 MMOL/L — SIGNIFICANT CHANGE UP (ref 3.5–5.3)
RBC # BLD: 3.03 M/UL — LOW (ref 4.2–5.8)
RBC # FLD: 16.5 % — HIGH (ref 10.3–14.5)
RHEUMATOID FACT SERPL-ACNC: <10 IU/ML — SIGNIFICANT CHANGE UP (ref 0–13)
SODIUM SERPL-SCNC: 143 MMOL/L — SIGNIFICANT CHANGE UP (ref 135–145)
WBC # BLD: 6.44 K/UL — SIGNIFICANT CHANGE UP (ref 3.8–10.5)
WBC # FLD AUTO: 6.44 K/UL — SIGNIFICANT CHANGE UP (ref 3.8–10.5)

## 2019-07-06 PROCEDURE — 99232 SBSQ HOSP IP/OBS MODERATE 35: CPT

## 2019-07-06 RX ADMIN — Medication 200 MILLIGRAM(S): at 06:04

## 2019-07-06 RX ADMIN — LOSARTAN POTASSIUM 50 MILLIGRAM(S): 100 TABLET, FILM COATED ORAL at 06:04

## 2019-07-06 RX ADMIN — AMLODIPINE BESYLATE 10 MILLIGRAM(S): 2.5 TABLET ORAL at 06:04

## 2019-07-06 RX ADMIN — Medication 200 MILLIGRAM(S): at 18:05

## 2019-07-06 RX ADMIN — Medication 25 MILLIGRAM(S): at 06:06

## 2019-07-06 RX ADMIN — ATORVASTATIN CALCIUM 20 MILLIGRAM(S): 80 TABLET, FILM COATED ORAL at 21:13

## 2019-07-06 RX ADMIN — SODIUM CHLORIDE 3 MILLILITER(S): 9 INJECTION INTRAMUSCULAR; INTRAVENOUS; SUBCUTANEOUS at 06:06

## 2019-07-06 RX ADMIN — SODIUM CHLORIDE 3 MILLILITER(S): 9 INJECTION INTRAMUSCULAR; INTRAVENOUS; SUBCUTANEOUS at 11:43

## 2019-07-06 RX ADMIN — IRON SUCROSE 110 MILLIGRAM(S): 20 INJECTION, SOLUTION INTRAVENOUS at 11:50

## 2019-07-06 RX ADMIN — SODIUM CHLORIDE 3 MILLILITER(S): 9 INJECTION INTRAMUSCULAR; INTRAVENOUS; SUBCUTANEOUS at 19:50

## 2019-07-06 NOTE — PROGRESS NOTE ADULT - SUBJECTIVE AND OBJECTIVE BOX
Patient is a 73y old  Male who presents with a chief complaint of low counts and weakness (05 Jul 2019 10:26)      SUBJECTIVE / OVERNIGHT EVENTS:  no acute events overnight, vss, afebrile  pt reports that he has not had any melena  denies abdominal pain, lightheadedness, chest pain, dyspnea    ROS:  14 point ROS negative in detail except stated as above    MEDICATIONS  (STANDING):  amLODIPine   Tablet 10 milliGRAM(s) Oral daily  atorvastatin 20 milliGRAM(s) Oral at bedtime  hydrochlorothiazide 25 milliGRAM(s) Oral daily  insulin lispro (HumaLOG) corrective regimen sliding scale   SubCutaneous three times a day before meals  iron sucrose IVPB 200 milliGRAM(s) IV Intermittent every 24 hours  labetalol 200 milliGRAM(s) Oral two times a day  losartan 50 milliGRAM(s) Oral daily  sodium chloride 0.9% lock flush 3 milliLiter(s) IV Push every 8 hours    MEDICATIONS  (PRN):      CAPILLARY BLOOD GLUCOSE      POCT Blood Glucose.: 142 mg/dL (06 Jul 2019 08:26)  POCT Blood Glucose.: 137 mg/dL (05 Jul 2019 22:00)  POCT Blood Glucose.: 136 mg/dL (05 Jul 2019 17:57)  POCT Blood Glucose.: 112 mg/dL (05 Jul 2019 13:13)    I&O's Summary    05 Jul 2019 07:01  -  06 Jul 2019 07:00  --------------------------------------------------------  IN: 360 mL / OUT: 0 mL / NET: 360 mL        PHYSICAL EXAM:  Vital Signs Last 24 Hrs  T(C): 36.9 (06 Jul 2019 04:31), Max: 36.9 (06 Jul 2019 04:31)  T(F): 98.5 (06 Jul 2019 04:31), Max: 98.5 (06 Jul 2019 04:31)  HR: 57 (06 Jul 2019 11:21) (55 - 69)  BP: 118/64 (06 Jul 2019 04:31) (97/635 - 118/64)  BP(mean): --  RR: 18 (06 Jul 2019 04:31) (16 - 18)  SpO2: 96% (06 Jul 2019 11:21) (94% - 98%)    GENERAL: NAD, well-developed  HEAD:  Atraumatic, Normocephalic  EYES: EOMI, PERRLA, conjunctiva and sclera clear  NECK: Supple, No JVD  CHEST/LUNG: Clear to auscultation bilaterally; No wheeze  HEART: Regular rate and rhythm; No murmurs, rubs, or gallops  ABDOMEN: Soft, Nontender, Nondistended; Bowel sounds present  EXTREMITIES:  2+ Peripheral Pulses, No clubbing, cyanosis, or edema  NEUROLOGY: AAOx3; non-focal  SKIN: No rashes or lesions    LABS:  personally reviewed                        8.2    6.44  )-----------( 170      ( 06 Jul 2019 11:07 )             27.7     07-06    143  |  102  |  30<H>  ----------------------------<  119<H>  3.8   |  28  |  1.72<H>    Ca    8.8      06 Jul 2019 06:56        CARDIAC MARKERS ( 06 Jul 2019 06:56 )  x     / x     / 421 U/L / x     / x              RADIOLOGY & ADDITIONAL TESTS:    Imaging Personally Reviewed:  -CTAP  Limited study without intravenous contrast.    Colonic diverticulosis.    Reticular opacities in the lower lobes bilaterally which were better   evaluated on chest CT dated 7/3/2019, suggestive of fibrosis of   indeterminate etiology.      Consultant(s) Notes Reviewed:  GI, heme/onc, pulm    Care Discussed with Consultants/Other Providers:

## 2019-07-06 NOTE — PROGRESS NOTE ADULT - ASSESSMENT
74 y/o/m with pmhx of  HTN, HLD, TATYANA, CKD III and DMII admitted 7/2 w/ cc of dizziness / dyspnea with exertion with occasional episodes of bilateral shoulder heaviness. Concerning for symptomatic anemia, awaiting GI workups    #  Normocytic anemia  - Suspect PHI, ferritin 17  - continue IV Venofer  - Patient with Hgb of 12 in May - trended down to 8s  - CT A/P with diverticulosis   - cont to trend cbc. transfuse if pt < 7 or if remains symptomatic   - pending rheumatology workup  - appreciate heme recs. agree with IV venofer and GI workups  - Cardiology evaluation pending for risks stratification for EGD/colonoscopy  - GI consult: Would benefit from EGD/colonoscopy to evaluate source of anemia, would plan for procedure Monday    # Dyspnea on exertion  - Suspect secondary to anemia. Less likely cardiac in nature given negative nuclear stress test. EKG nonischemic. PNA unlikely given lack of cough, fever, and PE signs. VTE also unlikely given lack of LE edema, tachycardia, and risk factors.  - CT: interstitial lung disease of undetermined etiology. 1.3 cm short axis subcarinal lymph node (series 3 image 84). Recommend follow-up chest CT in 3 months to determine stability.  - On RA, in no acute distress at this time  - Continue to monitor.   - pulm clearance for EGD/colonoscopy appreciated    # Dizziness  - Suspect secondary to anemia. Less likely cardiac in nature given negative nuclear stress test. Orthostatics negative. Exam nonfocal making stroke less likely.   - Anemia workup as above.  - Once workup complete, revisit need for ischemic workup with cardiology.     # Abnormal chest XRAY  - CT: interstitial lung disease of undetermined etiology. 1.3 cm short axis subcarinal lymph node (series 3 image 84).   Recommend follow-up chest CT in 3 months to determine stability.  - pulmonary evaluation appreciated. will follow outpatient.    # CKD (chronic kidney disease) stage 3, GFR 30-59 ml/min  - BUN/Cr ratio suggestive of chronic process. Unknown baseline.   - Scr 1.62-> 1.52-->1.65 --> 1.72    # HTN (hypertension)  -  Continue home medications.     # Type 2 diabetes mellitus  - LSSI  - Hgab1c  - consistent carb / dash diet. 74 y/o/m with pmhx of  HTN, HLD, TATYANA, CKD III and DMII admitted 7/2 w/ cc of dizziness / dyspnea with exertion with occasional episodes of bilateral shoulder heaviness. Concerning for symptomatic anemia, awaiting GI workups    #  Normocytic anemia  - Suspect PHI, ferritin 17  - continue IV Venofer  - Patient with Hgb of 12 in May - trended down to 8s  - CT A/P with diverticulosis   - cont to trend cbc. transfuse if pt < 7 or if remains symptomatic   - pending rheumatology workup  - appreciate heme recs. agree with IV venofer and GI workups  - Cardiology evaluation pending for risks stratification for EGD/colonoscopy  - GI consult: Would benefit from EGD/colonoscopy to evaluate source of anemia, would plan for procedure Monday    # Dyspnea on exertion  - Suspect secondary to anemia. Less likely cardiac in nature given negative nuclear stress test. EKG nonischemic. PNA unlikely given lack of cough, fever, and PE signs. VTE also unlikely given lack of LE edema, tachycardia, and risk factors.  - CT: interstitial lung disease of undetermined etiology. 1.3 cm short axis subcarinal lymph node (series 3 image 84). Recommend follow-up chest CT in 3 months to determine stability.  - On RA, in no acute distress at this time  - Continue to monitor.   - pulm clearance for EGD/colonoscopy appreciated    # Dizziness  - Suspect secondary to anemia. Less likely cardiac in nature given negative nuclear stress test. Orthostatics negative. Exam nonfocal making stroke less likely.   - Anemia workup as above.  - Once workup complete, revisit need for ischemic workup with cardiology.     # Abnormal chest XRAY  - CT: interstitial lung disease of undetermined etiology. 1.3 cm short axis subcarinal lymph node (series 3 image 84).   Recommend follow-up chest CT in 3 months to determine stability.  - pulmonary evaluation appreciated. will follow outpatient.    # CKD (chronic kidney disease) stage 3, GFR 30-59 ml/min  - BUN/Cr ratio suggestive of chronic process. Unknown baseline.   - Scr 1.62-> 1.52-->1.65 --> 1.72  - hold hydralazine in setting of worsening Cr    # HTN (hypertension)  -  Continue home medications.     # Type 2 diabetes mellitus  - LSSI  - Hgab1c  - consistent carb / dash diet.

## 2019-07-07 LAB
ANION GAP SERPL CALC-SCNC: 12 MMOL/L — SIGNIFICANT CHANGE UP (ref 5–17)
BUN SERPL-MCNC: 31 MG/DL — HIGH (ref 7–23)
CALCIUM SERPL-MCNC: 8.6 MG/DL — SIGNIFICANT CHANGE UP (ref 8.4–10.5)
CHLORIDE SERPL-SCNC: 101 MMOL/L — SIGNIFICANT CHANGE UP (ref 96–108)
CO2 SERPL-SCNC: 28 MMOL/L — SIGNIFICANT CHANGE UP (ref 22–31)
CREAT SERPL-MCNC: 1.69 MG/DL — HIGH (ref 0.5–1.3)
GLUCOSE BLDC GLUCOMTR-MCNC: 124 MG/DL — HIGH (ref 70–99)
GLUCOSE BLDC GLUCOMTR-MCNC: 130 MG/DL — HIGH (ref 70–99)
GLUCOSE BLDC GLUCOMTR-MCNC: 154 MG/DL — HIGH (ref 70–99)
GLUCOSE BLDC GLUCOMTR-MCNC: 168 MG/DL — HIGH (ref 70–99)
GLUCOSE SERPL-MCNC: 127 MG/DL — HIGH (ref 70–99)
HCT VFR BLD CALC: 28.1 % — LOW (ref 39–50)
HGB BLD-MCNC: 8.3 G/DL — LOW (ref 13–17)
MCHC RBC-ENTMCNC: 27.5 PG — SIGNIFICANT CHANGE UP (ref 27–34)
MCHC RBC-ENTMCNC: 29.5 GM/DL — LOW (ref 32–36)
MCV RBC AUTO: 93 FL — SIGNIFICANT CHANGE UP (ref 80–100)
PLATELET # BLD AUTO: 167 K/UL — SIGNIFICANT CHANGE UP (ref 150–400)
POTASSIUM SERPL-MCNC: 4.2 MMOL/L — SIGNIFICANT CHANGE UP (ref 3.5–5.3)
POTASSIUM SERPL-SCNC: 4.2 MMOL/L — SIGNIFICANT CHANGE UP (ref 3.5–5.3)
RBC # BLD: 3.02 M/UL — LOW (ref 4.2–5.8)
RBC # FLD: 16.6 % — HIGH (ref 10.3–14.5)
SODIUM SERPL-SCNC: 141 MMOL/L — SIGNIFICANT CHANGE UP (ref 135–145)
WBC # BLD: 7.98 K/UL — SIGNIFICANT CHANGE UP (ref 3.8–10.5)
WBC # FLD AUTO: 7.98 K/UL — SIGNIFICANT CHANGE UP (ref 3.8–10.5)

## 2019-07-07 PROCEDURE — 99232 SBSQ HOSP IP/OBS MODERATE 35: CPT

## 2019-07-07 PROCEDURE — 99233 SBSQ HOSP IP/OBS HIGH 50: CPT

## 2019-07-07 PROCEDURE — 99232 SBSQ HOSP IP/OBS MODERATE 35: CPT | Mod: GC

## 2019-07-07 RX ORDER — SOD SULF/SODIUM/NAHCO3/KCL/PEG
4000 SOLUTION, RECONSTITUTED, ORAL ORAL ONCE
Refills: 0 | Status: COMPLETED | OUTPATIENT
Start: 2019-07-07 | End: 2019-07-07

## 2019-07-07 RX ADMIN — Medication 5 MILLIGRAM(S): at 17:49

## 2019-07-07 RX ADMIN — Medication 4000 MILLILITER(S): at 17:50

## 2019-07-07 RX ADMIN — Medication 1: at 17:56

## 2019-07-07 RX ADMIN — LOSARTAN POTASSIUM 50 MILLIGRAM(S): 100 TABLET, FILM COATED ORAL at 06:22

## 2019-07-07 RX ADMIN — IRON SUCROSE 110 MILLIGRAM(S): 20 INJECTION, SOLUTION INTRAVENOUS at 11:58

## 2019-07-07 RX ADMIN — SODIUM CHLORIDE 3 MILLILITER(S): 9 INJECTION INTRAMUSCULAR; INTRAVENOUS; SUBCUTANEOUS at 06:20

## 2019-07-07 RX ADMIN — AMLODIPINE BESYLATE 10 MILLIGRAM(S): 2.5 TABLET ORAL at 06:22

## 2019-07-07 RX ADMIN — Medication 200 MILLIGRAM(S): at 17:49

## 2019-07-07 RX ADMIN — SODIUM CHLORIDE 3 MILLILITER(S): 9 INJECTION INTRAMUSCULAR; INTRAVENOUS; SUBCUTANEOUS at 21:07

## 2019-07-07 RX ADMIN — Medication 200 MILLIGRAM(S): at 06:22

## 2019-07-07 RX ADMIN — ATORVASTATIN CALCIUM 20 MILLIGRAM(S): 80 TABLET, FILM COATED ORAL at 21:07

## 2019-07-07 RX ADMIN — SODIUM CHLORIDE 3 MILLILITER(S): 9 INJECTION INTRAMUSCULAR; INTRAVENOUS; SUBCUTANEOUS at 14:52

## 2019-07-07 NOTE — CONSULT NOTE ADULT - SUBJECTIVE AND OBJECTIVE BOX
Patient seen and evaluated at bedside    Chief Complaint:    HPI:  Pt is a 74 yo man with a PMHx of HTN, HLD, TATYANA, CKD III and DM2 who originally presented on 7/2 for cardiac angiogram. Prior to admission, patient reported occasional episodes of moderate chest discomfort associated with dizziness, dyspnea and lightheadedness with moderate exertion x 2 weeks ago. Seen and evaluated by Dr. Mortensen ([facility]) recommended to have nuclear stress test which was completed on 6/25/19 revealing normal results, normal EF and wall motion, diaphragmatic attenuation. Symptoms still persisted, however, so he was recommended for [facility] for further ischemic evaluation.          PMH:   TATYANA (obstructive sleep apnea)  Mitral regurgitation  Diabetes  HLD (hyperlipidemia)  HTN (hypertension)  HTN (hypertension)  Abdominal hernia  Uncontrolled hypertension  Snoring  Mass      PSH:   Tooth decay  S/P tonsillectomy      Medications:   amLODIPine   Tablet 10 milliGRAM(s) Oral daily  atorvastatin 20 milliGRAM(s) Oral at bedtime  insulin lispro (HumaLOG) corrective regimen sliding scale   SubCutaneous three times a day before meals  iron sucrose IVPB 200 milliGRAM(s) IV Intermittent every 24 hours  labetalol 200 milliGRAM(s) Oral two times a day  losartan 50 milliGRAM(s) Oral daily  sodium chloride 0.9% lock flush 3 milliLiter(s) IV Push every 8 hours      Allergies:  No Known Allergies      FAMILY HISTORY:      Social History:  Smoking History:  Alcohol Use:  Drug Use:    Review of Systems:  REVIEW OF SYSTEMS:  CONSTITUTIONAL: No weakness, fevers or chills  EYES/ENT: No visual changes;  No dysphagia  NECK: No pain or stiffness  RESPIRATORY: No cough, wheezing, hemoptysis; No shortness of breath  CARDIOVASCULAR: No chest pain or palpitations; No lower extremity edema  GASTROINTESTINAL: No abdominal or epigastric pain. No nausea, vomiting, or hematemesis; No diarrhea or constipation. No melena or hematochezia.  BACK: No back pain  GENITOURINARY: No dysuria, frequency or hematuria  NEUROLOGICAL: No numbness or weakness  SKIN: No itching, burning, rashes, or lesions   All other review of systems is negative unless indicated above.    Physical Exam:  T(F): 98.3 (07-07), Max: 98.3 (07-07)  HR: 59 (07-07) (56 - 64)  BP: 110/63 (07-07) (104/58 - 119/60)  RR: 16 (07-07)  SpO2: 95% (07-07)  GENERAL: No acute distress, well-developed  HEAD:  Atraumatic, Normocephalic  ENT: EOMI, PERRLA, conjunctiva and sclera clear, Neck supple, No JVD, moist mucosa  CHEST/LUNG: Clear to auscultation bilaterally; No wheeze, equal breath sounds bilaterally   BACK: No spinal tenderness  HEART: Regular rate and rhythm; No murmurs, rubs, or gallops  ABDOMEN: Soft, Nontender, Nondistended; Bowel sounds present  EXTREMITIES:  No clubbing, cyanosis, or edema  PSYCH: Nl behavior, nl affect  NEUROLOGY: AAOx3, non-focal, cranial nerves intact  SKIN: Normal color, No rashes or lesions  LINES:    Cardiovascular Diagnostic Testing:    ECG: Personally reviewed    Echo:    Stress Testing:    Cath:    Interpretation of Telemetry:    Imaging:    Labs: Personally reviewed                        8.2    6.44  )-----------( 170      ( 06 Jul 2019 11:07 )             27.7     07-07    141  |  101  |  31<H>  ----------------------------<  127<H>  4.2   |  28  |  1.69<H>    Ca    8.6      07 Jul 2019 06:43        CARDIAC MARKERS ( 06 Jul 2019 06:56 )  x     / x     / 421 U/L / x     / x              Hemoglobin A1C, Whole Blood: 5.4 % (07-03 @ 11:17)    Thyroid Stimulating Hormone, Serum: 2.99 uIU/mL (07-03 @ 11:39) Patient seen and evaluated at bedside    Chief Complaint:    HPI:  Pt is a 72 yo man with a PMHx of HTN, HLD, TATYANA, CKD III and DM2 who originally presented on 7/2 for cardiac angiogram. Prior to admission, patient reported occasional episodes of moderate chest discomfort associated with dizziness, dyspnea and lightheadedness with moderate exertion x 2 weeks ago. Seen and evaluated by Dr. Mortensen ([facility]) recommended to have nuclear stress test which was completed on 6/25/19 revealing normal results, normal EF and wall motion, diaphragmatic attenuation. Symptoms still persisted, however, so he was recommended for C for further ischemic evaluation.    Preop labs prior to [facility] showed hemoglobin of 8.4 from 12 (this past May). Patient transferred to medicine for management and workup of anemia prior to any potential ischemic workup. Seen and evaluated by GI. Has remained HDS.  Cardiology consulted for preoperative risk assessment prior to EGD.    PMH:   TATYANA (obstructive sleep apnea)  Mitral regurgitation  Diabetes  HLD (hyperlipidemia)  HTN (hypertension)  HTN (hypertension)  Abdominal hernia  Uncontrolled hypertension  Snoring  Mass      PSH:   Tooth decay  S/P tonsillectomy      Medications:   amLODIPine   Tablet 10 milliGRAM(s) Oral daily  atorvastatin 20 milliGRAM(s) Oral at bedtime  insulin lispro (HumaLOG) corrective regimen sliding scale   SubCutaneous three times a day before meals  iron sucrose IVPB 200 milliGRAM(s) IV Intermittent every 24 hours  labetalol 200 milliGRAM(s) Oral two times a day  losartan 50 milliGRAM(s) Oral daily  sodium chloride 0.9% lock flush 3 milliLiter(s) IV Push every 8 hours      Allergies:  No Known Allergies      FAMILY HISTORY:      Social History:  Smoking History: Denies  Alcohol Use: Denies  Drug Use: Denies    Review of Systems:  REVIEW OF SYSTEMS:  CONSTITUTIONAL: No weakness, fevers or chills  EYES/ENT: No visual changes;  No dysphagia  NECK: No pain or stiffness  RESPIRATORY: No cough, wheezing, hemoptysis; No shortness of breath  CARDIOVASCULAR: No chest pain or palpitations; No lower extremity edema  GASTROINTESTINAL: No abdominal or epigastric pain. No nausea, vomiting, or hematemesis; No diarrhea or constipation. No melena or hematochezia.  BACK: No back pain  GENITOURINARY: No dysuria, frequency or hematuria  NEUROLOGICAL: No numbness or weakness  SKIN: No itching, burning, rashes, or lesions   All other review of systems is negative unless indicated above.    Physical Exam:  T(F): 98.3 (07-07), Max: 98.3 (07-07)  HR: 59 (07-07) (56 - 64)  BP: 110/63 (07-07) (104/58 - 119/60)  RR: 16 (07-07)  SpO2: 95% (07-07)  GENERAL: No acute distress, well-developed  HEAD:  Atraumatic, Normocephalic  ENT: EOMI, PERRLA, conjunctiva and sclera clear, Neck supple, No JVD, moist mucosa  CHEST/LUNG: Clear to auscultation bilaterally; No wheeze, equal breath sounds bilaterally   BACK: No spinal tenderness  HEART: Regular rate and rhythm; No murmurs, rubs, or gallops  ABDOMEN: Soft, Nontender, Nondistended; Bowel sounds present  EXTREMITIES:  No clubbing, cyanosis, or edema  PSYCH: Nl behavior, nl affect  NEUROLOGY: AAOx3, non-focal, cranial nerves intact  SKIN: Normal color, No rashes or lesions  LINES:    Cardiovascular Diagnostic Testing:    ECG: Personally reviewed    Echo: No previous echo in [place]    Interpretation of Telemetry:    Imaging:  CT Chest 7/3:    IMPRESSION:   1.  Mild basilar predominant peripheral reticulations, pulmonary   ossification and minimal tractional bronchiectasis representing   interstitial lung disease of undetermined etiology.    2.  1.3 cm short axis subcarinal lymph node (series 3 image 84).   Recommend follow-up chest CT in 3 months to determine stability.      FRANCISCO J SMITH M.D., ATTENDING RADIOLOGIST  This document has been electronically signed. Jul3 2019  3:04PM      Labs: Personally reviewed                        8.2    6.44  )-----------( 170      ( 06 Jul 2019 11:07 )             27.7     07-07    141  |  101  |  31<H>  ----------------------------<  127<H>  4.2   |  28  |  1.69<H>    Ca    8.6      07 Jul 2019 06:43        CARDIAC MARKERS ( 06 Jul 2019 06:56 )  x     / x     / 421 U/L / x     / x              Hemoglobin A1C, Whole Blood: 5.4 % (07-03 @ 11:17)    Thyroid Stimulating Hormone, Serum: 2.99 uIU/mL (07-03 @ 11:39)

## 2019-07-07 NOTE — PROGRESS NOTE ADULT - SUBJECTIVE AND OBJECTIVE BOX
Patient is a 73y old  Male who presents with a chief complaint of symptomatic anemia (06 Jul 2019 11:55)        SUBJECTIVE / OVERNIGHT EVENTS:  overnight no acute events.  pt sitting up at bedside denies complaints.  notices occasionally dark stool but no brbpr.  no n/v/f/chills, cp, sob, abd pain.    CAPILLARY BLOOD GLUCOSE      POCT Blood Glucose.: 168 mg/dL (07 Jul 2019 13:21)  POCT Blood Glucose.: 130 mg/dL (07 Jul 2019 09:38)  POCT Blood Glucose.: 147 mg/dL (06 Jul 2019 21:26)  POCT Blood Glucose.: 125 mg/dL (06 Jul 2019 18:09)    I&O's Summary    06 Jul 2019 07:01  -  07 Jul 2019 07:00  --------------------------------------------------------  IN: 660 mL / OUT: 1 mL / NET: 659 mL    07 Jul 2019 07:01  -  07 Jul 2019 14:14  --------------------------------------------------------  IN: 720 mL / OUT: 0 mL / NET: 720 mL        Vital Signs Last 24 Hrs  T(C): 36.8 (07 Jul 2019 04:20), Max: 36.8 (07 Jul 2019 04:20)  T(F): 98.3 (07 Jul 2019 04:20), Max: 98.3 (07 Jul 2019 04:20)  HR: 59 (07 Jul 2019 10:03) (56 - 64)  BP: 110/63 (07 Jul 2019 04:20) (104/58 - 110/63)  BP(mean): --  RR: 16 (07 Jul 2019 04:20) (16 - 17)  SpO2: 95% (07 Jul 2019 10:03) (95% - 96%)    PHYSICAL EXAM:  GENERAL:  Well appearing M, in NAD  HEAD:  NCAT  EYES: PERRLA  NECK: Supple, No JVD  CHEST/LUNG: CTA B/L. No w/r/r.  HEART: Reg rate. Normal S1, S2. No m/r/g.   ABDOMEN: SNT, obese  EXTREMITIES:  2+ Peripheral Pulses, No clubbing, cyanosis, edema.  PSYCH:appropriate affect    LABS:                        8.3    7.98  )-----------( 167      ( 07 Jul 2019 10:48 )             28.1     07-07    141  |  101  |  31<H>  ----------------------------<  127<H>  4.2   |  28  |  1.69<H>    Ca    8.6      07 Jul 2019 06:43        CARDIAC MARKERS ( 06 Jul 2019 06:56 )  x     / x     / 421 U/L / x     / x                RADIOLOGY & ADDITIONAL TESTS:  Consultant(s) Notes Reviewed:  cardiology, GI  Care Discussed with Consultants/Other Providers: Floor NP, cards fellow    MEDICATIONS  (STANDING):  amLODIPine   Tablet 10 milliGRAM(s) Oral daily  atorvastatin 20 milliGRAM(s) Oral at bedtime  bisacodyl 5 milliGRAM(s) Oral once  insulin lispro (HumaLOG) corrective regimen sliding scale   SubCutaneous three times a day before meals  iron sucrose IVPB 200 milliGRAM(s) IV Intermittent every 24 hours  labetalol 200 milliGRAM(s) Oral two times a day  losartan 50 milliGRAM(s) Oral daily  polyethylene glycol/electrolyte Solution. 4000 milliLiter(s) Oral once  sodium chloride 0.9% lock flush 3 milliLiter(s) IV Push every 8 hours    MEDICATIONS  (PRN):

## 2019-07-07 NOTE — PROGRESS NOTE ADULT - ASSESSMENT
72 y/o m pmhx HTN, HLD, TATYANA, CKD III and DMII sent in for angiogram given LAUREN/CP found to have symptomatic anemia, now for EGD/cscope 7/8.     #  Normocytic anemia  - Suspect PHI, ferritin 17  - continue IV Venofer  - Patient with Hgb of 12 in May - trended down to 8s  - CT A/P with diverticulosis   - cont to trend cbc. transfuse if pt < 7 or if remains symptomatic   - pending rheumatology workup  - appreciate heme recs. agree with IV venofer and GI workup  - d/w cardiology - seen today, and he is medically optimized prior to procedure tomorrow   - GI consult: Would benefit from EGD/colonoscopy to evaluate source of anemia, would plan for procedure Monday. NPO MN. Clears today.    # Dyspnea on exertion  - Suspect secondary to anemia. Less likely cardiac in nature given negative nuclear stress test. EKG nonischemic. PNA unlikely given lack of cough, fever, and PE signs. VTE also unlikely given lack of LE edema, tachycardia, and risk factors.  - CT: interstitial lung disease of undetermined etiology. 1.3 cm short axis subcarinal lymph node (series 3 image 84). Recommend follow-up chest CT in 3 months to determine stability.  - On RA, in no acute distress at this time  - Continue to monitor.   - pulm clearance for EGD/colonoscopy appreciated    # Dizziness  - Suspect secondary to anemia. Less likely cardiac in nature given negative nuclear stress test. Orthostatics negative. Exam nonfocal making stroke less likely.   - Anemia workup as above.  - Once workup complete, revisit need for ischemic workup with cardiology.     # Abnormal chest XRAY  - CT: interstitial lung disease of undetermined etiology. 1.3 cm short axis subcarinal lymph node (series 3 image 84).   Recommend follow-up chest CT in 3 months to determine stability.  - pulmonary evaluation appreciated. will follow outpatient.    # CKD (chronic kidney disease) stage 3, GFR 30-59 ml/min  - BUN/Cr ratio suggestive of chronic process. Unknown baseline.   - Scr 1.62-> 1.52-->1.65 --> 1.72 --> 1.7  - hold hydralazine in setting of worsening Cr    # HTN (hypertension)  -  Continue home medications - labetalol, amlodipine    # Type 2 diabetes mellitus  - LSSI  - consistent carb / dash diet    Dispo: pending EGD/cscope 7/8

## 2019-07-07 NOTE — CONSULT NOTE ADULT - ASSESSMENT
Pt is a 72 yo man with a PMHx of HTN, HLD, TATYANA, CKD III and DM2 who originally presented on 7/2 for elective cardiac angiogram. Preop labs prior to [facility] showed hemoglobin of 8.4 from 12 (this past May). Patient transferred to medicine for management and workup of anemia prior to any potential ischemic workup. Seen and evaluated by GI. Cardiology consulted for preoperative risk assessment prior to EGD.    -RCRI I. ASA 2.  -Continue atorvastatin, labetalol, losartan  *Full note to follow    Marquis Koenig MD  Cardiology Fellow  All cardiology service information can be found 24/7 on [email or web address], password: [email or web address] Pt is a 72 yo man with a PMHx of HTN, HLD, TATYANA, CKD III and DM2 who originally presented on 7/2 for elective cardiac angiogram. Preop labs prior to [facility] showed hemoglobin of 8.4 from 12 (this past May). Patient transferred to medicine for management and workup of anemia prior to any potential ischemic workup. Seen and evaluated by GI. Cardiology consulted for preoperative risk assessment prior to EGD.    1.  He has good exercise tolerance, normal nuclear stress test, no chest pain, no heart failure.  2.  Patient should proceed with planned endoscopic evaluation as planned for assessment of anemia    [place] [id number]

## 2019-07-07 NOTE — PROGRESS NOTE ADULT - ASSESSMENT
Impression:  1) Anemia without overt bleeding- Pt with iron sat of 13% consistent with iron deficiency anemia. Pt could have occult bleeding from PUD, malignancy (stomach, small bowel, colon), celiac disease. Other causes of normocytic anemia includes bone marrow suppression, hypothyroidism etc  2) Moderate chest discomfort associated with dizziness, dyspnea and lightheadedness with moderate exertion -currently undergoing work up with cardiology    Recommendations:  -Plan for EGD/colonoscopy tomorrow  -Clear liquid diet today  -NPO past midnight  -Prep ordered  -Serial CBC, transfuse as needed  -Recommend cardiology's risk stratification and optimization prior to procedure  -Rest of care per primary team

## 2019-07-07 NOTE — PROGRESS NOTE ADULT - SUBJECTIVE AND OBJECTIVE BOX
Chief Complaint:  Patient is a 73y old  Male who presents with a chief complaint of symptomatic anemia (2019 11:55)      Interval Events: Pt offers no complaints today. Pt had one small BM yesterday that was dark brown in color. Hgb is 8.2.   ROS: All 12 point system except listed above were otherwise negative.    Allergies:  No Known Allergies        Hospital Medications:  amLODIPine   Tablet 10 milliGRAM(s) Oral daily  atorvastatin 20 milliGRAM(s) Oral at bedtime  insulin lispro (HumaLOG) corrective regimen sliding scale   SubCutaneous three times a day before meals  iron sucrose IVPB 200 milliGRAM(s) IV Intermittent every 24 hours  labetalol 200 milliGRAM(s) Oral two times a day  losartan 50 milliGRAM(s) Oral daily  sodium chloride 0.9% lock flush 3 milliLiter(s) IV Push every 8 hours      PMHX/PSHX:  TATYANA (obstructive sleep apnea)  Mitral regurgitation  Diabetes  HLD (hyperlipidemia)  HTN (hypertension)  HTN (hypertension)  Abdominal hernia  Uncontrolled hypertension  Snoring  Mass  Tooth decay  S/P tonsillectomy      Family history:    There is no family history of peptic ulcer disease, gastric cancer, colon polyps, colon cancer, celiac disease, biliary, hepatic, or pancreatic disease.  None of the female relatives have breast, uterine, or ovarian cancer.     PHYSICAL EXAM:   Vital Signs:  Vital Signs Last 24 Hrs  T(C): 36.8 (2019 04:20), Max: 36.8 (2019 14:13)  T(F): 98.3 (2019 04:20), Max: 98.3 (2019 04:20)  HR: 64 (2019 06:19) (56 - 64)  BP: 110/63 (2019 04:20) (104/58 - 119/60)  BP(mean): --  RR: 16 (2019 04:20) (16 - 17)  SpO2: 95% (2019 04:20) (95% - 97%)  Daily     Daily Weight in k.8 (2019 09:12)      PHYSICAL EXAM:     GENERAL:  Appears stated age, well-groomed  HEENT:  NC/AT,  conjunctivae clear and pink  CHEST:  Full & symmetric excursion, no increased effort, breath sounds clear  HEART:  Regular rhythm, S1, S2, no murmur/rub/S3/S4  ABDOMEN:  Soft, non-tender, non-distended, normoactive bowel sounds  EXTEREMITIES:  no cyanosis,clubbing or edema  SKIN:  No rash/erythema/ecchymoses  NEURO:  Alert, oriented, no asterixis  RECTAL: Brown stool on exam    LABS:                        8.2    6.44  )-----------( 170      ( 2019 11:07 )             27.7     Mean Cell Volume: 91.4 fl (19 @ 11:07)        141  |  101  |  31<H>  ----------------------------<  127<H>  4.2   |  28  |  1.69<H>    Ca    8.6      2019 06:43                                    8.2    6.44  )-----------( 170      ( 2019 11:07 )             27.7                         8.1    6.60  )-----------( 163      ( 2019 09:09 )             27.2                         7.6    6.22  )-----------( 161      ( 2019 10:04 )             26.5     Imaging: Chief Complaint:  Patient is a 73y old  Male who presents with a chief complaint of symptomatic anemia (2019 11:55)      Interval Events: Pt offers no complaints today. Pt had one small BM yesterday that was dark brown in color. Hgb is 8.2.   ROS: All 12 point system except listed above were otherwise negative.    Allergies:  No Known Allergies        Hospital Medications:  amLODIPine   Tablet 10 milliGRAM(s) Oral daily  atorvastatin 20 milliGRAM(s) Oral at bedtime  insulin lispro (HumaLOG) corrective regimen sliding scale   SubCutaneous three times a day before meals  iron sucrose IVPB 200 milliGRAM(s) IV Intermittent every 24 hours  labetalol 200 milliGRAM(s) Oral two times a day  losartan 50 milliGRAM(s) Oral daily  sodium chloride 0.9% lock flush 3 milliLiter(s) IV Push every 8 hours      PMHX/PSHX:  TATYANA (obstructive sleep apnea)  Mitral regurgitation  Diabetes  HLD (hyperlipidemia)  HTN (hypertension)  HTN (hypertension)  Abdominal hernia  Uncontrolled hypertension  Snoring  Mass  Tooth decay  S/P tonsillectomy      Family history:    There is no family history of peptic ulcer disease, gastric cancer, colon polyps, colon cancer, celiac disease, biliary, hepatic, or pancreatic disease.  None of the female relatives have breast, uterine, or ovarian cancer.     PHYSICAL EXAM:   Vital Signs:  Vital Signs Last 24 Hrs  T(C): 36.8 (2019 04:20), Max: 36.8 (2019 14:13)  T(F): 98.3 (2019 04:20), Max: 98.3 (2019 04:20)  HR: 64 (2019 06:19) (56 - 64)  BP: 110/63 (2019 04:20) (104/58 - 119/60)  BP(mean): --  RR: 16 (2019 04:20) (16 - 17)  SpO2: 95% (2019 04:20) (95% - 97%)  Daily     Daily Weight in k.8 (2019 09:12)      PHYSICAL EXAM:     GENERAL:  Appears stated age, well-groomed  HEENT:  NC/AT,  conjunctivae clear and pink  CHEST:  Full & symmetric excursion, no increased effort, breath sounds clear  HEART:  Regular rhythm, S1, S2, no murmur/rub/S3/S4  ABDOMEN:  Soft, non-tender, non-distended, normoactive bowel sounds  EXTEREMITIES:  no cyanosis,clubbing or edema  SKIN:  No rash/erythema/ecchymoses  NEURO:  Alert, oriented, no asterixis      LABS:                        8.2    6.44  )-----------( 170      ( 2019 11:07 )             27.7     Mean Cell Volume: 91.4 fl (19 @ 11:07)        141  |  101  |  31<H>  ----------------------------<  127<H>  4.2   |  28  |  1.69<H>    Ca    8.6      2019 06:43                                    8.2    6.44  )-----------( 170      ( 2019 11:07 )             27.7                         8.1    6.60  )-----------( 163      ( 2019 09:09 )             27.2                         7.6    6.22  )-----------( 161      ( 2019 10:04 )             26.5     Imaging:

## 2019-07-08 DIAGNOSIS — D49.9 NEOPLASM OF UNSPECIFIED BEHAVIOR OF UNSPECIFIED SITE: ICD-10-CM

## 2019-07-08 LAB
ANION GAP SERPL CALC-SCNC: 12 MMOL/L — SIGNIFICANT CHANGE UP (ref 5–17)
ANTI-RIBONUCLEAR PROTEIN: <0.2 AI — SIGNIFICANT CHANGE UP
BUN SERPL-MCNC: 27 MG/DL — HIGH (ref 7–23)
CALCIUM SERPL-MCNC: 8.4 MG/DL — SIGNIFICANT CHANGE UP (ref 8.4–10.5)
CHLORIDE SERPL-SCNC: 98 MMOL/L — SIGNIFICANT CHANGE UP (ref 96–108)
CO2 SERPL-SCNC: 29 MMOL/L — SIGNIFICANT CHANGE UP (ref 22–31)
CREAT SERPL-MCNC: 1.48 MG/DL — HIGH (ref 0.5–1.3)
DSDNA AB FLD-ACNC: <0.2 AI — SIGNIFICANT CHANGE UP
ENA SCL70 AB SER-ACNC: <0.2 AI — SIGNIFICANT CHANGE UP
ENA SM AB FLD QL: <0.2 AI — SIGNIFICANT CHANGE UP
ENA SS-A AB FLD IA-ACNC: <0.2 AI — SIGNIFICANT CHANGE UP
GLUCOSE BLDC GLUCOMTR-MCNC: 112 MG/DL — HIGH (ref 70–99)
GLUCOSE BLDC GLUCOMTR-MCNC: 112 MG/DL — HIGH (ref 70–99)
GLUCOSE BLDC GLUCOMTR-MCNC: 122 MG/DL — HIGH (ref 70–99)
GLUCOSE BLDC GLUCOMTR-MCNC: 161 MG/DL — HIGH (ref 70–99)
GLUCOSE SERPL-MCNC: 96 MG/DL — SIGNIFICANT CHANGE UP (ref 70–99)
HCT VFR BLD CALC: 25.5 % — LOW (ref 39–50)
HCT VFR BLD CALC: 26 % — LOW (ref 39–50)
HGB BLD-MCNC: 7.7 G/DL — LOW (ref 13–17)
HGB BLD-MCNC: 8.6 G/DL — LOW (ref 13–17)
MCHC RBC-ENTMCNC: 27 PG — SIGNIFICANT CHANGE UP (ref 27–34)
MCHC RBC-ENTMCNC: 29.6 GM/DL — LOW (ref 32–36)
MCHC RBC-ENTMCNC: 29.7 PG — SIGNIFICANT CHANGE UP (ref 27–34)
MCHC RBC-ENTMCNC: 33.8 GM/DL — SIGNIFICANT CHANGE UP (ref 32–36)
MCV RBC AUTO: 87.9 FL — SIGNIFICANT CHANGE UP (ref 80–100)
MCV RBC AUTO: 91.2 FL — SIGNIFICANT CHANGE UP (ref 80–100)
PLATELET # BLD AUTO: 154 K/UL — SIGNIFICANT CHANGE UP (ref 150–400)
PLATELET # BLD AUTO: 172 K/UL — SIGNIFICANT CHANGE UP (ref 150–400)
POTASSIUM SERPL-MCNC: 3.5 MMOL/L — SIGNIFICANT CHANGE UP (ref 3.5–5.3)
POTASSIUM SERPL-SCNC: 3.5 MMOL/L — SIGNIFICANT CHANGE UP (ref 3.5–5.3)
RBC # BLD: 2.85 M/UL — LOW (ref 4.2–5.8)
RBC # BLD: 2.9 M/UL — LOW (ref 4.2–5.8)
RBC # FLD: 16.8 % — HIGH (ref 10.3–14.5)
RBC # FLD: 16.9 % — HIGH (ref 10.3–14.5)
SODIUM SERPL-SCNC: 139 MMOL/L — SIGNIFICANT CHANGE UP (ref 135–145)
WBC # BLD: 6.3 K/UL — SIGNIFICANT CHANGE UP (ref 3.8–10.5)
WBC # BLD: 6.77 K/UL — SIGNIFICANT CHANGE UP (ref 3.8–10.5)
WBC # FLD AUTO: 6.3 K/UL — SIGNIFICANT CHANGE UP (ref 3.8–10.5)
WBC # FLD AUTO: 6.77 K/UL — SIGNIFICANT CHANGE UP (ref 3.8–10.5)

## 2019-07-08 PROCEDURE — 99223 1ST HOSP IP/OBS HIGH 75: CPT

## 2019-07-08 PROCEDURE — 43255 EGD CONTROL BLEEDING ANY: CPT

## 2019-07-08 PROCEDURE — 99233 SBSQ HOSP IP/OBS HIGH 50: CPT

## 2019-07-08 PROCEDURE — 45378 DIAGNOSTIC COLONOSCOPY: CPT

## 2019-07-08 RX ORDER — PANTOPRAZOLE SODIUM 20 MG/1
8 TABLET, DELAYED RELEASE ORAL
Qty: 80 | Refills: 0 | Status: DISCONTINUED | OUTPATIENT
Start: 2019-07-08 | End: 2019-07-12

## 2019-07-08 RX ADMIN — SODIUM CHLORIDE 3 MILLILITER(S): 9 INJECTION INTRAMUSCULAR; INTRAVENOUS; SUBCUTANEOUS at 21:55

## 2019-07-08 RX ADMIN — LOSARTAN POTASSIUM 50 MILLIGRAM(S): 100 TABLET, FILM COATED ORAL at 11:39

## 2019-07-08 RX ADMIN — SODIUM CHLORIDE 3 MILLILITER(S): 9 INJECTION INTRAMUSCULAR; INTRAVENOUS; SUBCUTANEOUS at 05:06

## 2019-07-08 RX ADMIN — Medication 200 MILLIGRAM(S): at 05:07

## 2019-07-08 RX ADMIN — PANTOPRAZOLE SODIUM 10 MG/HR: 20 TABLET, DELAYED RELEASE ORAL at 20:26

## 2019-07-08 RX ADMIN — ATORVASTATIN CALCIUM 20 MILLIGRAM(S): 80 TABLET, FILM COATED ORAL at 21:54

## 2019-07-08 RX ADMIN — IRON SUCROSE 110 MILLIGRAM(S): 20 INJECTION, SOLUTION INTRAVENOUS at 11:40

## 2019-07-08 RX ADMIN — SODIUM CHLORIDE 3 MILLILITER(S): 9 INJECTION INTRAMUSCULAR; INTRAVENOUS; SUBCUTANEOUS at 17:08

## 2019-07-08 RX ADMIN — AMLODIPINE BESYLATE 10 MILLIGRAM(S): 2.5 TABLET ORAL at 05:07

## 2019-07-08 NOTE — PROGRESS NOTE ADULT - ASSESSMENT
7/5/19 the p over the last few weeks has been weak dizzy and easily fatigued. He went to see his pmd and the hemoglobin was noted to be in the 8 range where a few months earlier it was 12. He has been under the care of Dr Marc Mai and his hemoglobin was always about 12. He may have been experiencing dark stools over the last few weeks and has not been taking iron pills. The guaiac here was negative for blood. The pt was seen by pulmonary here for interstitial changes in the lungs and will have a ct scan to see if further studies are needed. He was a former . The GFR was 41 and bun was 26 and the white cell count was 6.6 hemoglobin 8.1 and MCV of 91 and MCHC of 29.8 and rdw of 16.5 . The ferritin was only 17 and this suggests iron deficiency anemia and he is to have a EGD and colonoscopy and I will give him iv iron to see if there is a response and he can follow with Dr Mai from heme onc.  7/8 pt receiving iv iron and gi eval in process, hemoglobin was 8.6.

## 2019-07-08 NOTE — PROGRESS NOTE ADULT - ASSESSMENT
74 y/o m pmhx HTN, HLD, TATYANA, CKD III and DMII sent in for angiogram given LAUREN/CP found to have symptomatic anemia, now for EGD/cscope 7/8.     #  Normocytic anemia  - Suspect PHI, ferritin 17  - continue IV Venofer  - Patient with Hgb of 12 in May - trended down to 8s  - CT A/P with diverticulosis   - cont to trend cbc. transfuse if pt < 7 or if remains symptomatic   - pending rheumatology workup  - appreciate heme recs. agree with IV venofer and GI workup  - cardiology clearance appreciated  - GI eval appreciated, planned endoscopy today, cardiology clearance appreciated    # Dyspnea on exertion  - Suspect secondary to anemia. Less likely cardiac in nature given negative nuclear stress test. EKG nonischemic. PNA unlikely given lack of cough, fever, and PE signs. VTE also unlikely given lack of LE edema, tachycardia, and risk factors.  - CT: interstitial lung disease of undetermined etiology. 1.3 cm short axis subcarinal lymph node (series 3 image 84). Recommend follow-up chest CT in 3 months to determine stability.  - On RA, in no acute distress at this time  - pulm clearance for EGD/colonoscopy appreciated    # Dizziness  - Suspect secondary to anemia. Less likely cardiac in nature given negative nuclear stress test. Orthostatics negative. Exam nonfocal making stroke less likely.   - Anemia workup as above.  - Once workup complete, revisit need for ischemic workup with cardiology.     # Abnormal chest XRAY  - CT: interstitial lung disease of undetermined etiology. 1.3 cm short axis subcarinal lymph node (series 3 image 84).   Recommend follow-up chest CT in 3 months to determine stability.  - pulmonary evaluation appreciated. will follow outpatient.    # CKD (chronic kidney disease) stage 3, GFR 30-59 ml/min  - BUN/Cr ratio suggestive of chronic process. Unknown baseline.   - Scr 1.62-> 1.52-->1.65 --> 1.72 --> 1.7 -> 1.48  - hold hydralazine in setting of worsening Cr    # HTN (hypertension)  -  Continue home medications - labetalol, amlodipine    # Type 2 diabetes mellitus  - LSSI  - consistent carb / dash diet 74 y/o m pmhx HTN, HLD, TATYANA, CKD III and DMII sent in for angiogram given LAUREN/CP found to have symptomatic anemia, now for EGD/cscope 7/8.     #  Normocytic anemia  - Suspect PHI, ferritin 17  - continue IV Venofer  - Patient with Hgb of 12 in May - trended down to 8s  - CT A/P with diverticulosis   - cont to trend cbc. transfuse if pt < 7 or if remains symptomatic   - appreciate heme recs. agree with IV venofer and GI workup  - cardiology clearance appreciated  - GI eval appreciated, planned endoscopy today, cardiology clearance appreciated    # Dyspnea on exertion  - Suspect secondary to anemia. Less likely cardiac in nature given negative nuclear stress test. EKG nonischemic. PNA unlikely given lack of cough, fever, and PE signs. VTE also unlikely given lack of LE edema, tachycardia, and risk factors.  - CT: interstitial lung disease of undetermined etiology. 1.3 cm short axis subcarinal lymph node (series 3 image 84). Recommend follow-up chest CT in 3 months to determine stability.  - On RA, in no acute distress at this time  - pulm clearance for EGD/colonoscopy appreciated    # Dizziness  - Suspect secondary to anemia. Less likely cardiac in nature given negative nuclear stress test. Orthostatics negative. Exam nonfocal making stroke less likely.   - Anemia workup as above.  - Once workup complete, revisit need for ischemic workup with cardiology.     # Abnormal chest XRAY  - CT: interstitial lung disease of undetermined etiology. 1.3 cm short axis subcarinal lymph node (series 3 image 84).   Recommend follow-up chest CT in 3 months to determine stability.  - pulmonary evaluation appreciated. will follow outpatient.    # CKD (chronic kidney disease) stage 3, GFR 30-59 ml/min  - BUN/Cr ratio suggestive of chronic process. Unknown baseline.   - Scr 1.62-> 1.52-->1.65 --> 1.72 --> 1.7 -> 1.48  - hold hydralazine in setting of worsening Cr    # HTN (hypertension)  -  Continue home medications - labetalol, amlodipine    # Type 2 diabetes mellitus  - LSSI  - consistent carb / dash diet 72 y/o m pmhx HTN, HLD, TATYANA, CKD III and DMII sent in for angiogram given LAUREN/CP found to have symptomatic anemia, now for EGD/cscope 7/8.     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   - GI following  - Surgery following  - Heme / onc following  - patient will need CT abd /  pelvis for further staging purposes    # Dyspnea on exertion  - Suspect secondary to anemia. Less likely cardiac in nature given negative nuclear stress test. EKG nonischemic. PNA unlikely given lack of cough, fever, and PE signs. VTE also unlikely given lack of LE edema, tachycardia, and risk factors.  - CT: interstitial lung disease of undetermined etiology. 1.3 cm short axis subcarinal lymph node (series 3 image 84). Recommend follow-up chest CT in 3 months to determine stability.  - On RA, in no acute distress at this time  - pulm clearance for EGD/colonoscopy appreciated    # Dizziness  - Suspect secondary to anemia. Less likely cardiac in nature given negative nuclear stress test. Orthostatics negative. Exam nonfocal making stroke less likely.   - Anemia workup as above.  - Once workup complete, revisit need for ischemic workup with cardiology.     # Abnormal chest XRAY  - CT: interstitial lung disease of undetermined etiology. 1.3 cm short axis subcarinal lymph node (series 3 image 84).   Recommend follow-up chest CT in 3 months to determine stability.  - pulmonary evaluation appreciated. will follow outpatient.    # CKD (chronic kidney disease) stage 3, GFR 30-59 ml/min  - BUN/Cr ratio suggestive of chronic process. Unknown baseline.   - Scr 1.62-> 1.52-->1.65 --> 1.72 --> 1.7 -> 1.48  - hold hydralazine in setting of worsening Cr    # HTN (hypertension)  -  Continue home medications - labetalol, amlodipine    # Type 2 diabetes mellitus  - LSSI  - consistent carb / dash diet

## 2019-07-08 NOTE — PROGRESS NOTE ADULT - SUBJECTIVE AND OBJECTIVE BOX
Pre-Endoscopy Evaluation      Referring Physician: dr. weber                                  Procedure:  upper gastrointestinal endoscopy/colonoscopy    Indication for Procedure: gib    Pertinent History: 73y male PMH below with symptomatic anemia without overt bleeding    Sedation by Anesthesia [x]    PAST MEDICAL & SURGICAL HISTORY:  TATYANA (obstructive sleep apnea)  Mitral regurgitation  Diabetes  HLD (hyperlipidemia)  HTN (hypertension)  Abdominal hernia  Uncontrolled hypertension: BP ranged 170-250/--asymptomatic. Sent from [facility] office on 13 to [facility] ER via ambulance  Snoring: TATYANA precautions--responds affirmatively to STOP BANG questionnaire--admits to loud snoring and daytime somnolence; witnessed apneic events by wife; age &gt; 50; gender, male  Mass: right foot soft tissue mass in 2013  Tooth decay: surgery for tooth extraction  S/P tonsillectomy      PMH of Gastroparesis [ ]  Gastric Surgery [ ]  Gastric Outlet Obstruction [ ]    Allergies:    No Known Allergies    Intolerances    Latex allergy: [ ] yes [X] no    Medications:MEDICATIONS  (STANDING):  amLODIPine   Tablet 10 milliGRAM(s) Oral daily  atorvastatin 20 milliGRAM(s) Oral at bedtime  insulin lispro (HumaLOG) corrective regimen sliding scale   SubCutaneous three times a day before meals  iron sucrose IVPB 200 milliGRAM(s) IV Intermittent every 24 hours  labetalol 200 milliGRAM(s) Oral two times a day  losartan 50 milliGRAM(s) Oral daily  sodium chloride 0.9% lock flush 3 milliLiter(s) IV Push every 8 hours    MEDICATIONS  (PRN):      Smoking: [ ] yes  [X] no    AICD/PPM: [ ] yes   [X] no    Pertinent lab data:                        8.6    6.3   )-----------( 154      ( 2019 11:43 )             25.5     07-08    139  |  98  |  27<H>  ----------------------------<  96  3.5   |  29  |  1.48<H>    Ca    8.4      2019 05:36    CAPILLARY BLOOD GLUCOSE  POCT Blood Glucose.: 112 mg/dL (2019 11:25)      Physical Examination:  Daily     Daily Weight in k (2019 07:47)  Vital Signs Last 24 Hrs  T(C): 36.7 (2019 12:46), Max: 36.7 (2019 21:22)  T(F): 98.1 (2019 12:46), Max: 98.1 (2019 21:22)  HR: 56 (2019 12:46) (56 - 63)  BP: 113/60 (2019 12:46) (105/60 - 123/61)  BP(mean): --  RR: 16 (2019 12:46) (16 - 18)  SpO2: 94% (2019 12:46) (94% - 98%)    Drug Dosing Weight  Height (cm): 187.96 (2019 15:40)  Weight (kg): 119.8 (2019 21:22)  BMI (kg/m2): 33.9 (2019 21:22)  BSA (m2): 2.45 (2019 21:22)    Constitutional: NAD     Neck:  No JVD    Respiratory: CTAB/L    Cardiovascular: S1 and S2    Gastrointestinal: BS+, soft, NT/ND    Extremities: No peripheral edema    Neurological: A/O x 3    : No Hodge    Skin: No rashes    Comments:    ASA Class: I [ ]  II [ ]  III [X]  IV [ ]    The patient is a suitable candidate for the planned procedure unless box checked [ ]  No, explain:

## 2019-07-08 NOTE — PROGRESS NOTE ADULT - ASSESSMENT
A/P: 74 yo man with a PMHx of HTN, HLD, TATYANA, CKD III and DM2, who originally presented on 7/2 for elective cardiac angiogram. Preop labs prior to [facility] showed hemoglobin of 8.4 from 12 (this past May). Patient transferred to medicine for management and workup of anemia prior to any potential ischemic workup. Seen and evaluated by GI. Cardiology consulted for preoperative risk assessment prior to EGD.    - clinically stable with no chest pain  - cleared for endoscopy today per GI  - normal NST in 6/2019, unclear whether pt requires further ischemic work-up  - will continue to follow, please call with further questions    Marquis Darden, #[phone]  Cardiology Fellow

## 2019-07-08 NOTE — PROGRESS NOTE ADULT - SUBJECTIVE AND OBJECTIVE BOX
Cardiology Progress Note    Interval: Pt resting comfortably in bed. Noted no chest pain this morning.     Tele: Off telemetry    HPI:  74 yo  male (no implantable devices) PMH HTN, HLD, TATYANA, CKD III and DMT2 (a1c 2019, managed by PCP, uncomplicated) presents for cardiac angiogram. Patient reports occasional episodes of moderate chest discomfort associated with dizziness, dyspnea and lightheadedness with moderate exertion x 2 weeks ago. Seen and evaluated by Dr. Mortensen (cards) recommended to have nuclear stress test which was completed on 19 revealing normal results, normal EF and wall motion, diaphragmatic attenuation. Symptoms still persist- recommends to have LHC for further ischemic evaluation. (2019 09:24)      Medications:  amLODIPine   Tablet 10 milliGRAM(s) Oral daily  atorvastatin 20 milliGRAM(s) Oral at bedtime  insulin lispro (HumaLOG) corrective regimen sliding scale   SubCutaneous three times a day before meals  iron sucrose IVPB 200 milliGRAM(s) IV Intermittent every 24 hours  labetalol 200 milliGRAM(s) Oral two times a day  losartan 50 milliGRAM(s) Oral daily  sodium chloride 0.9% lock flush 3 milliLiter(s) IV Push every 8 hours      Review of Systems:  Constitutional: [ ] Fever [ ] Chills [ ] Fatigue [ ] Weight change   HEENT: [ ] Blurred vision [ ] Eye Pain [ ] Headache [ ] Runny nose [ ] Sore Throat   Respiratory: [ ] Cough [ ] Wheezing [ ] Shortness of breath  Cardiovascular: [ ] Chest Pain [ ] Palpitations [ ] LAUREN [ ] PND [ ] Orthopnea  Gastrointestinal: [ ] Abdominal Pain [ ] Diarrhea [ ] Constipation [ ] Hemorrhoids [ ] Nausea [ ] Vomiting  Genitourinary: [ ] Nocturia [ ] Dysuria [ ] Incontinence  Extremities: [ ] Swelling [ ] Joint Pain  Neurologic: [ ] Focal deficit [ ] Paresthesias [ ] Syncope  Lymphatic: [ ] Swelling [ ] Lymphadenopathy   Skin: [ ] Rash [ ] Ecchymoses [ ] Wounds [ ] Lesions  Psychiatry: [ ] Depression [ ] Suicidal/Homicidal Ideation [ ] Anxiety [ ] Sleep Disturbances  [ ] 10 point review of systems is otherwise negative except as mentioned above            [ ]Unable to obtain    Vitals:  T(C): 36.4 (19 @ 04:48), Max: 36.7 (07-07-19 @ 14:30)  HR: 62 (19 @ 04:48) (58 - 63)  BP: 105/60 (19 @ 04:48) (105/60 - 120/68)  BP(mean): --  RR: 16 (19 @ 04:48) (16 - 18)  SpO2: 97% (19 @ 04:48) (96% - 99%)  Wt(kg): --  Daily     Daily Weight in k (2019 07:47)  I&O's Summary    2019 07:01  -  2019 07:00  --------------------------------------------------------  IN: 720 mL / OUT: 0 mL / NET: 720 mL        Physical Exam:  General: NAD  Eye: PERRL, EOMI  HENT: Normal oral mucosa NC/AT  CV: Normal S1/S2, RRR, No M/R/G, no edema, no elevation in JVP  Resp: Normal respiratory effort, clear to auscultation bilaterally, no wheezing, no crackles  Abd: Soft, Non-tender, Non-distended, BS+  Ext: No clubbing, No joint deformity   Neuro: Non-focal, motor and sensory intact  Lymph: No lymphadenopathy  Psych: AAOx3, Mood & affect appropriate  Skin: No rashes, No ecchymoses, No cyanosis    Labs:                        7.7    6.77  )-----------( 172      ( 2019 07:44 )             26.0         139  |  98  |  27<H>  ----------------------------<  96  3.5   |  29  |  1.48<H>    Ca    8.4      2019 05:36                    New results/imaging:

## 2019-07-08 NOTE — PROGRESS NOTE ADULT - SUBJECTIVE AND OBJECTIVE BOX
Patient is a 73y old  Male who presents with a chief complaint of symptomatic anemia (06 Jul 2019 11:55)    SUBJECTIVE / OVERNIGHT EVENTS:  No new complaints. hemodynamically stable. Denies any HA, CP, SOB. Cardiology clearance appreciated for the planned GI procedure.     PHYSICAL EXAM:  GENERAL:  Well appearing M, in NAD  HEAD:  NCAT  EYES: PERRLA  NECK: Supple, No JVD  CHEST/LUNG: CTA B/L. No w/r/r.  HEART: Reg rate. Normal S1, S2. No m/r/g.   ABDOMEN: SNT, obese  EXTREMITIES:  2+ Peripheral Pulses, No clubbing, cyanosis, edema.  PSYCH: appropriate affect    LABS:             CBC Full  -  ( 08 Jul 2019 11:43 )  WBC Count : 6.3 K/uL  RBC Count : 2.90 M/uL  Hemoglobin : 8.6 g/dL  Hematocrit : 25.5 %  Platelet Count - Automated : 154 K/uL    139  |  98  |  27<H>  ----------------------------<  96  3.5   |  29  |  1.48<H>    Ca    8.4      08 Jul 2019 05:36 Patient is a 73y old  Male who presents with a chief complaint of symptomatic anemia (06 Jul 2019 11:55)    SUBJECTIVE / OVERNIGHT EVENTS:  No new complaints. hemodynamically stable. Denies any HA, CP, SOB. Cardiology clearance appreciated for the planned GI procedure. Care discussed with patient / patient's daughter and wife. I explained to the patient and the family that currently we are treating him with iron supplement for iron deficiency anemia. We don't know the exact reason for PHI, thats why medical team is conducting further tests to identify cause of PHI.     REVIEW OF SYSTEMS:  General: NAD, hemodynamically stable   HEENT:  Eyes:  No visual loss  SKIN:  No rash or itching.  CARDIOVASCULAR:  No chest pain   RESPIRATORY:  No shortness of breath   GASTROINTESTINAL:  no dark stools  NEUROLOGICAL:  No headache, dizziness, syncope, paralysis, ataxia, numbness or tingling in the extremities. No change in bowel or bladder control.  MUSCULOSKELETAL:  No muscle, back pain, joint pain or stiffness.  HEMATOLOGIC:  + anemia  LYMPHATICS:  No enlarged nodes. No history of splenectomy.  ENDOCRINOLOGIC:  No reports of sweating, cold or heat intolerance. No polyuria or polydipsia.  ALLERGIES:  No history of asthma, hives, eczema or rhinitis.    PHYSICAL EXAM:  GENERAL:  Well appearing M, in NAD  HEAD:  NCAT  EYES: PERRLA  NECK: Supple, No JVD  CHEST/LUNG: CTA B/L. No w/r/r.  HEART: Reg rate. Normal S1, S2. No m/r/g.   ABDOMEN: SNT, obese  EXTREMITIES:  2+ Peripheral Pulses, No clubbing, cyanosis, edema.  PSYCH: appropriate affect    LABS:             CBC Full  -  ( 08 Jul 2019 11:43 )  WBC Count : 6.3 K/uL  RBC Count : 2.90 M/uL  Hemoglobin : 8.6 g/dL  Hematocrit : 25.5 %  Platelet Count - Automated : 154 K/uL    139  |  98  |  27<H>  ----------------------------<  96  3.5   |  29  |  1.48<H>    Ca    8.4      08 Jul 2019 05:36 Patient is a 73y old  Male who presents with a chief complaint of symptomatic anemia (06 Jul 2019 11:55)    SUBJECTIVE / OVERNIGHT EVENTS:  No new complaints. hemodynamically stable. Denies any HA, CP, SOB. Cardiology clearance appreciated for the planned GI procedure. Care discussed with patient / patient's daughter and wife. I explained to the patient and the family that currently we are treating him with iron supplement for iron deficiency anemia. We don't know the exact reason for PHI, thats why medical team is conducting further tests to identify cause of PHI. Endoscopy / Colonoscopy results reviewed. patient and patient's family aware.       REVIEW OF SYSTEMS:  General: NAD, hemodynamically stable   HEENT:  Eyes:  No visual loss  SKIN:  No rash or itching.  CARDIOVASCULAR:  No chest pain   RESPIRATORY:  No shortness of breath   GASTROINTESTINAL:  no dark stools  NEUROLOGICAL:  No headache, dizziness, syncope, paralysis, ataxia, numbness or tingling in the extremities. No change in bowel or bladder control.  MUSCULOSKELETAL:  No muscle, back pain, joint pain or stiffness.  HEMATOLOGIC:  + anemia  LYMPHATICS:  No enlarged nodes. No history of splenectomy.  ENDOCRINOLOGIC:  No reports of sweating, cold or heat intolerance. No polyuria or polydipsia.  ALLERGIES:  No history of asthma, hives, eczema or rhinitis.    PHYSICAL EXAM:  GENERAL:  Well appearing M, in NAD  HEAD:  NCAT  EYES: PERRLA  NECK: Supple, No JVD  CHEST/LUNG: CTA B/L. No w/r/r.  HEART: Reg rate. Normal S1, S2. No m/r/g.   ABDOMEN: SNT, obese  EXTREMITIES:  2+ Peripheral Pulses, No clubbing, cyanosis, edema.  PSYCH: appropriate affect    LABS:             CBC Full  -  ( 08 Jul 2019 11:43 )  WBC Count : 6.3 K/uL  RBC Count : 2.90 M/uL  Hemoglobin : 8.6 g/dL  Hematocrit : 25.5 %  Platelet Count - Automated : 154 K/uL    139  |  98  |  27<H>  ----------------------------<  96  3.5   |  29  |  1.48<H>    Ca    8.4      08 Jul 2019 05:36

## 2019-07-08 NOTE — CONSULT NOTE ADULT - SUBJECTIVE AND OBJECTIVE BOX
HPI: 73M with PMH HTN, HLD, DM2, TATYANA, CKD stage 3 who presented to the hospital for a cardiac cath. Pt reports that for 2 weeks he was experiencing dyspnea, dizziness, lightheadedness, and some mild discomfort in his shoulders and chest. His symptoms are worse on exertion and seem to improve with rest. He went to his cardiologist who performed a nuclear stress test (June 25th) that was normal. On his pre-op labs his Hb was 8.4. As an outpatient, the patient has his Hb checked every 4 months by his hematologist (Dr. Mai) for anemia of undetermined origin. His Hb is usually around 12 and was last checked in May 2019.     While working up the anemia, the patient had a CT scan of the chest/abdomen/pelvis that showed diverticulosis and some mild fibrosis of his b/l lower lobes. Pt was taken by GI for an upper endoscopy and colonoscopy today. According to his wife, colonoscopy was positive for diverticulosis (pending report in computer). Endoscopy showed a 2 cm (as reported by wife), submucosal mass with an ulcer in the center and a visible vessel with circumferential oozing. The vessel was clipped. Pt's last colonoscopy was normal approximately 8 years ago (Dr. Cyrus Moreland).    ROS: No headaches, fevers, chills, night sweats, weight loss, change in appetite, change in bowel movements, BRBPR, melena, nausea, vomiting. ROS otherwise negative. No history of abdominal surgery.     PMH: HTN, HLD DM2, CKD stage 3, mitral regurgitation, TATYANA  PSH: tonsillectomy (as a child), tooth extraction, removal of R foot lipoma (Dr. Jones (the wife thinks this was the surgeon) - orthopedics at [facility]) 8-10 years ago  Allergies: NKDA    MEDICATIONS  (STANDING):  amLODIPine   Tablet 10 milliGRAM(s) Oral daily  atorvastatin 20 milliGRAM(s) Oral at bedtime  insulin lispro (HumaLOG) corrective regimen sliding scale   SubCutaneous three times a day before meals  iron sucrose IVPB 200 milliGRAM(s) IV Intermittent every 24 hours  labetalol 200 milliGRAM(s) Oral two times a day  losartan 50 milliGRAM(s) Oral daily  pantoprazole Infusion 8 mG/Hr (10 mL/Hr) IV Continuous <Continuous>  sodium chloride 0.9% lock flush 3 milliLiter(s) IV Push every 8 hours    Social: Non smoker, 2-3 alcoholic drinks per month, no drug use.  and lives with wife.     Exam:  Vital Signs Last 24 Hrs  T(C): 36.3 (08 Jul 2019 18:29), Max: 36.7 (07 Jul 2019 21:22)  T(F): 97.4 (08 Jul 2019 18:29), Max: 98.1 (07 Jul 2019 21:22)  HR: 57 (08 Jul 2019 18:29) (56 - 63)  BP: 109/47 (08 Jul 2019 18:29) (105/60 - 123/61)  BP(mean): --  RR: 16 (08 Jul 2019 18:29) (16 - 18)  SpO2: 99% (08 Jul 2019 18:29) (94% - 99%)    General: NAD, resting comfortably  Pulm: breathing comfortably  CV: regular rate and rhythm  Abd: soft, nontender, nondistended  Extrem: FROM, ambulatory                        8.6    6.3   )-----------( 154      ( 08 Jul 2019 11:43 )             25.5       07-08    139  |  98  |  27<H>  ----------------------------<  96  3.5   |  29  |  1.48<H>    Ca    8.4      08 Jul 2019 05:36    CT chest/abdomen/pelvis: Reviewed. CT Chest: b/l lower lobe mild fibrosis. CT Abd/Pelvis: diverticulosis    Endoscopy: 2 cm submucosal mass with ulcerated center, visible vessel (s/p clip)  Colonoscopy: pending report    Stool guaiac negative

## 2019-07-08 NOTE — PROGRESS NOTE ADULT - SUBJECTIVE AND OBJECTIVE BOX
Patient is a 73y old  Male who presents with a chief complaint of symptomatic anemia (05 Jul 2019 08:54)      HPI:  74 yo  male (no implantable devices) PMH HTN, HLD, TATYANA, CKD III and DMT2 (a1c June 2019, managed by PCP, uncomplicated) presents for cardiac angiogram. Patient reports occasional episodes of moderate chest discomfort associated with dizziness, dyspnea and lightheadedness with moderate exertion x 2 weeks ago. Seen and evaluated by Dr. Mortensen (cards) recommended to have nuclear stress test which was completed on 6/25/19 revealing normal results, normal EF and wall motion, diaphragmatic attenuation. Symptoms still persist- recommends to have LHC for further ischemic evaluation. (02 Jul 2019 09:24)    7/5/19 the p over the last few weeks has been weak dizzy and easily fatigued. He went to see his pmd and the hemoglobin was noted to be in the 8 range where a few months earlier it was 12. He has been under the care of Dr Marc Mai and his hemoglobin was always about 12. He may have been experiencing dark stools over the last few weeks and has not been taking iron pills. The guaiac here was negative for blood. The pt was seen by pulmonary here for interstitial changes in the lungs and will have a ct scan to see if further studies are needed. He was a former . The GFR was 41 and bun was 26 and the white cell count was 6.6 hemoglobin 8.1 and MCV of 91 and MCHC of 29.8 and rdw of 16.5 . The ferritin was only 17 and this suggests iron deficiency anemia and he is to have a EGD and colonoscopy and I will give him iv iron to see if there is a response and he can follow with Dr Mai from heme onc.  7/8 pt receiving iv iron and gi eval in process, hemoglobin was 8.6.  ICU Vital Signs Last 24 Hrs  T(C): 36.7 (08 Jul 2019 12:46), Max: 36.7 (07 Jul 2019 21:22)  T(F): 98.1 (08 Jul 2019 12:46), Max: 98.1 (07 Jul 2019 21:22)  HR: 56 (08 Jul 2019 12:46) (56 - 63)  BP: 113/60 (08 Jul 2019 12:46) (105/60 - 123/61)  BP(mean): --  ABP: --  ABP(mean): --  RR: 16 (08 Jul 2019 12:46) (16 - 18)  SpO2: 94% (08 Jul 2019 12:46) (94% - 98%)  MEDICATIONS  (STANDING):  amLODIPine   Tablet 10 milliGRAM(s) Oral daily  atorvastatin 20 milliGRAM(s) Oral at bedtime  insulin lispro (HumaLOG) corrective regimen sliding scale   SubCutaneous three times a day before meals  iron sucrose IVPB 200 milliGRAM(s) IV Intermittent every 24 hours  labetalol 200 milliGRAM(s) Oral two times a day  losartan 50 milliGRAM(s) Oral daily  sodium chloride 0.9% lock flush 3 milliLiter(s) IV Push every 8 hours    LABS:                        8.6    6.3   )-----------( 154      ( 08 Jul 2019 11:43 )             25.5     07-05    141  |  100  |  26<H>  ----------------------------<  116<H>  3.9   |  26  |  1.65<H>    Ca    9.1      05 Jul 2019 06:33  Phos  3.2     07-04  Mg     2.4     07-04      PT/INR - ( 04 Jul 2019 08:53 )   PT: 13.5 sec;   INR: 1.18 ratio         PTT - ( 04 Jul 2019 08:53 )  PTT:34.7 sec      ROS:  Negative except for:    PAST MEDICAL & SURGICAL HISTORY:  TATYANA (obstructive sleep apnea)  Mitral regurgitation  Diabetes  HLD (hyperlipidemia)  HTN (hypertension)  Abdominal hernia  Uncontrolled hypertension: BP ranged 170-250/--asymptomatic. Sent from [facility] office on 8/12/13 to [facility] ER via ambulance  Snoring: TATYANA precautions--responds affirmatively to STOP BANG questionnaire--admits to loud snoring and daytime somnolence; witnessed apneic events by wife; age &gt; 50; gender, male  Mass: right foot soft tissue mass in August 2013  Tooth decay: surgery for tooth extraction  S/P tonsillectomy      SOCIAL HISTORY:    FAMILY HISTORY:      Allergies    No Known Allergies    Intolerances        PHYSICAL EXAM being seen by the staff and I will return  General: adult in NAD  HEENT: clear oropharynx, anicteric sclera, pink conjunctivae  Neck: supple  CV: normal S1S2 with no murmur rubs or gallops  Lungs: clear to auscultation, no wheezes, no rales  Abdomen: soft non-tender non-distended, no hepatosplenomegaly  Ext: no clubbing cyanosis or edema  Skin: no rashes and no petechiae  Neuro: alert and oriented X3 no focal deficits    BLOOD SMEAR INTERPRETATION:    RADIOLOGY :

## 2019-07-09 LAB
ANION GAP SERPL CALC-SCNC: 11 MMOL/L — SIGNIFICANT CHANGE UP (ref 5–17)
AUTO DIFF PNL BLD: NEGATIVE — SIGNIFICANT CHANGE UP
BUN SERPL-MCNC: 21 MG/DL — SIGNIFICANT CHANGE UP (ref 7–23)
C-ANCA SER-ACNC: NEGATIVE — SIGNIFICANT CHANGE UP
CALCIUM SERPL-MCNC: 8.7 MG/DL — SIGNIFICANT CHANGE UP (ref 8.4–10.5)
CHLORIDE SERPL-SCNC: 104 MMOL/L — SIGNIFICANT CHANGE UP (ref 96–108)
CO2 SERPL-SCNC: 28 MMOL/L — SIGNIFICANT CHANGE UP (ref 22–31)
CREAT SERPL-MCNC: 1.51 MG/DL — HIGH (ref 0.5–1.3)
GLUCOSE BLDC GLUCOMTR-MCNC: 100 MG/DL — HIGH (ref 70–99)
GLUCOSE BLDC GLUCOMTR-MCNC: 100 MG/DL — HIGH (ref 70–99)
GLUCOSE BLDC GLUCOMTR-MCNC: 109 MG/DL — HIGH (ref 70–99)
GLUCOSE BLDC GLUCOMTR-MCNC: 99 MG/DL — SIGNIFICANT CHANGE UP (ref 70–99)
GLUCOSE SERPL-MCNC: 97 MG/DL — SIGNIFICANT CHANGE UP (ref 70–99)
HCT VFR BLD CALC: 21.4 % — LOW (ref 39–50)
HCT VFR BLD CALC: 22.3 % — LOW (ref 39–50)
HCT VFR BLD CALC: 23.1 % — LOW (ref 39–50)
HCT VFR BLD CALC: 25.8 % — LOW (ref 39–50)
HGB BLD-MCNC: 7.4 G/DL — LOW (ref 13–17)
HGB BLD-MCNC: 7.6 G/DL — LOW (ref 13–17)
HGB BLD-MCNC: 7.6 G/DL — LOW (ref 13–17)
HGB BLD-MCNC: 8.3 G/DL — LOW (ref 13–17)
MAGNESIUM SERPL-MCNC: 2.4 MG/DL — SIGNIFICANT CHANGE UP (ref 1.6–2.6)
MCHC RBC-ENTMCNC: 28.4 PG — SIGNIFICANT CHANGE UP (ref 27–34)
MCHC RBC-ENTMCNC: 28.8 PG — SIGNIFICANT CHANGE UP (ref 27–34)
MCHC RBC-ENTMCNC: 30 PG — SIGNIFICANT CHANGE UP (ref 27–34)
MCHC RBC-ENTMCNC: 30.3 PG — SIGNIFICANT CHANGE UP (ref 27–34)
MCHC RBC-ENTMCNC: 32.3 GM/DL — SIGNIFICANT CHANGE UP (ref 32–36)
MCHC RBC-ENTMCNC: 33 GM/DL — SIGNIFICANT CHANGE UP (ref 32–36)
MCHC RBC-ENTMCNC: 33.8 GM/DL — SIGNIFICANT CHANGE UP (ref 32–36)
MCHC RBC-ENTMCNC: 34.7 GM/DL — SIGNIFICANT CHANGE UP (ref 32–36)
MCV RBC AUTO: 87.2 FL — SIGNIFICANT CHANGE UP (ref 80–100)
MCV RBC AUTO: 87.4 FL — SIGNIFICANT CHANGE UP (ref 80–100)
MCV RBC AUTO: 87.9 FL — SIGNIFICANT CHANGE UP (ref 80–100)
MCV RBC AUTO: 88.6 FL — SIGNIFICANT CHANGE UP (ref 80–100)
P-ANCA SER-ACNC: NEGATIVE — SIGNIFICANT CHANGE UP
PHOSPHATE SERPL-MCNC: 2.9 MG/DL — SIGNIFICANT CHANGE UP (ref 2.5–4.5)
PLATELET # BLD AUTO: 152 K/UL — SIGNIFICANT CHANGE UP (ref 150–400)
PLATELET # BLD AUTO: 157 K/UL — SIGNIFICANT CHANGE UP (ref 150–400)
PLATELET # BLD AUTO: 173 K/UL — SIGNIFICANT CHANGE UP (ref 150–400)
PLATELET # BLD AUTO: 178 K/UL — SIGNIFICANT CHANGE UP (ref 150–400)
POTASSIUM SERPL-MCNC: 4 MMOL/L — SIGNIFICANT CHANGE UP (ref 3.5–5.3)
POTASSIUM SERPL-SCNC: 4 MMOL/L — SIGNIFICANT CHANGE UP (ref 3.5–5.3)
RBC # BLD: 2.45 M/UL — LOW (ref 4.2–5.8)
RBC # BLD: 2.52 M/UL — LOW (ref 4.2–5.8)
RBC # BLD: 2.64 M/UL — LOW (ref 4.2–5.8)
RBC # BLD: 2.93 M/UL — LOW (ref 4.2–5.8)
RBC # FLD: 16.5 % — HIGH (ref 10.3–14.5)
RBC # FLD: 16.6 % — HIGH (ref 10.3–14.5)
RBC # FLD: 16.7 % — HIGH (ref 10.3–14.5)
RBC # FLD: 16.8 % — HIGH (ref 10.3–14.5)
SODIUM SERPL-SCNC: 143 MMOL/L — SIGNIFICANT CHANGE UP (ref 135–145)
WBC # BLD: 5.2 K/UL — SIGNIFICANT CHANGE UP (ref 3.8–10.5)
WBC # BLD: 5.9 K/UL — SIGNIFICANT CHANGE UP (ref 3.8–10.5)
WBC # BLD: 6 K/UL — SIGNIFICANT CHANGE UP (ref 3.8–10.5)
WBC # BLD: 6.6 K/UL — SIGNIFICANT CHANGE UP (ref 3.8–10.5)
WBC # FLD AUTO: 5.2 K/UL — SIGNIFICANT CHANGE UP (ref 3.8–10.5)
WBC # FLD AUTO: 5.9 K/UL — SIGNIFICANT CHANGE UP (ref 3.8–10.5)
WBC # FLD AUTO: 6 K/UL — SIGNIFICANT CHANGE UP (ref 3.8–10.5)
WBC # FLD AUTO: 6.6 K/UL — SIGNIFICANT CHANGE UP (ref 3.8–10.5)

## 2019-07-09 PROCEDURE — 99222 1ST HOSP IP/OBS MODERATE 55: CPT | Mod: GC

## 2019-07-09 PROCEDURE — 99232 SBSQ HOSP IP/OBS MODERATE 35: CPT

## 2019-07-09 PROCEDURE — 99233 SBSQ HOSP IP/OBS HIGH 50: CPT

## 2019-07-09 RX ORDER — SODIUM CHLORIDE 9 MG/ML
1000 INJECTION INTRAMUSCULAR; INTRAVENOUS; SUBCUTANEOUS
Refills: 0 | Status: DISCONTINUED | OUTPATIENT
Start: 2019-07-09 | End: 2019-07-12

## 2019-07-09 RX ADMIN — SODIUM CHLORIDE 3 MILLILITER(S): 9 INJECTION INTRAMUSCULAR; INTRAVENOUS; SUBCUTANEOUS at 13:56

## 2019-07-09 RX ADMIN — SODIUM CHLORIDE 3 MILLILITER(S): 9 INJECTION INTRAMUSCULAR; INTRAVENOUS; SUBCUTANEOUS at 05:20

## 2019-07-09 RX ADMIN — ATORVASTATIN CALCIUM 20 MILLIGRAM(S): 80 TABLET, FILM COATED ORAL at 22:11

## 2019-07-09 RX ADMIN — IRON SUCROSE 110 MILLIGRAM(S): 20 INJECTION, SOLUTION INTRAVENOUS at 11:16

## 2019-07-09 RX ADMIN — Medication 200 MILLIGRAM(S): at 17:36

## 2019-07-09 RX ADMIN — PANTOPRAZOLE SODIUM 10 MG/HR: 20 TABLET, DELAYED RELEASE ORAL at 05:18

## 2019-07-09 RX ADMIN — LOSARTAN POTASSIUM 50 MILLIGRAM(S): 100 TABLET, FILM COATED ORAL at 10:09

## 2019-07-09 RX ADMIN — AMLODIPINE BESYLATE 10 MILLIGRAM(S): 2.5 TABLET ORAL at 05:17

## 2019-07-09 RX ADMIN — SODIUM CHLORIDE 3 MILLILITER(S): 9 INJECTION INTRAMUSCULAR; INTRAVENOUS; SUBCUTANEOUS at 22:12

## 2019-07-09 RX ADMIN — SODIUM CHLORIDE 50 MILLILITER(S): 9 INJECTION INTRAMUSCULAR; INTRAVENOUS; SUBCUTANEOUS at 22:15
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
## 2019-07-09 NOTE — PROGRESS NOTE ADULT - SUBJECTIVE AND OBJECTIVE BOX
Patient is a 73y old  Male who presents with a chief complaint of symptomatic anemia (05 Jul 2019 08:54)      HPI:  74 yo  male (no implantable devices) PMH HTN, HLD, TATYANA, CKD III and DMT2 (a1c June 2019, managed by PCP, uncomplicated) presents for cardiac angiogram. Patient reports occasional episodes of moderate chest discomfort associated with dizziness, dyspnea and lightheadedness with moderate exertion x 2 weeks ago. Seen and evaluated by Dr. Mortensen (cards) recommended to have nuclear stress test which was completed on 6/25/19 revealing normal results, normal EF and wall motion, diaphragmatic attenuation. Symptoms still persist- recommends to have LHC for further ischemic evaluation. (02 Jul 2019 09:24)    7/5/19 the p over the last few weeks has been weak dizzy and easily fatigued. He went to see his pmd and the hemoglobin was noted to be in the 8 range where a few months earlier it was 12. He has been under the care of Dr Marc Mai and his hemoglobin was always about 12. He may have been experiencing dark stools over the last few weeks and has not been taking iron pills. The guaiac here was negative for blood. The pt was seen by pulmonary here for interstitial changes in the lungs and will have a ct scan to see if further studies are needed. He was a former . The GFR was 41 and bun was 26 and the white cell count was 6.6 hemoglobin 8.1 and MCV of 91 and MCHC of 29.8 and rdw of 16.5 . The ferritin was only 17 and this suggests iron deficiency anemia and he is to have a EGD and colonoscopy and I will give him iv iron to see if there is a response and he can follow with Dr Mai from heme onc.  7/8 pt receiving iv iron and gi eval in process, hemoglobin was 8.6. 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 The iron continues and will continue to check the cbc for now.  ICU Vital Signs Last 24 Hrs  T(C): 36.7 (08 Jul 2019 12:46), Max: 36.7 (07 Jul 2019 21:22)  T(F): 98.1 (08 Jul 2019 12:46), Max: 98.1 (07 Jul 2019 21:22)  HR: 56 (08 Jul 2019 12:46) (56 - 63)  BP: 113/60 (08 Jul 2019 12:46) (105/60 - 123/61)  BP(mean): --  ABP: --  ABP(mean): --  RR: 16 (08 Jul 2019 12:46) (16 - 18)  SpO2: 94% (08 Jul 2019 12:46) (94% - 98%)  MEDICATIONS  (STANDING):  amLODIPine   Tablet 10 milliGRAM(s) Oral daily  atorvastatin 20 milliGRAM(s) Oral at bedtime  insulin lispro (HumaLOG) corrective regimen sliding scale   SubCutaneous three times a day before meals  iron sucrose IVPB 200 milliGRAM(s) IV Intermittent every 24 hours  labetalol 200 milliGRAM(s) Oral two times a day  losartan 50 milliGRAM(s) Oral daily  sodium chloride 0.9% lock flush 3 milliLiter(s) IV Push every 8 hours    LABS:                        8.6    6.3   )-----------( 154      ( 08 Jul 2019 11:43 )             25.5     07-05    141  |  100  |  26<H>  ----------------------------<  116<H>  3.9   |  26  |  1.65<H>    Ca    9.1      05 Jul 2019 06:33  Phos  3.2     07-04  Mg     2.4     07-04      PT/INR - ( 04 Jul 2019 08:53 )   PT: 13.5 sec;   INR: 1.18 ratio         PTT - ( 04 Jul 2019 08:53 )  PTT:34.7 sec      ROS:  Negative except for:    PAST MEDICAL & SURGICAL HISTORY:  TATYANA (obstructive sleep apnea)  Mitral regurgitation  Diabetes  HLD (hyperlipidemia)  HTN (hypertension)  Abdominal hernia  Uncontrolled hypertension: BP ranged 170-250/--asymptomatic. Sent from [facility]ST office on 8/12/13 to [facility] ER via ambulance  Snoring: TATYANA precautions--responds affirmatively to STOP BANG questionnaire--admits to loud snoring and daytime somnolence; witnessed apneic events by wife; age &gt; 50; gender, male  Mass: right foot soft tissue mass in August 2013  Tooth decay: surgery for tooth extraction  S/P tonsillectomy      SOCIAL HISTORY:    FAMILY HISTORY:      Allergies    No Known Allergies    Intolerances        PHYSICAL EXAM being seen by the staff and I will return  General: adult in NAD  HEENT: clear oropharynx, anicteric sclera, pink conjunctivae  Neck: supple  CV: normal S1S2 with no murmur rubs or gallops  Lungs: clear to auscultation, no wheezes, no rales  Abdomen: soft non-tender non-distended, no hepatosplenomegaly  Ext: no clubbing cyanosis or edema  Skin: no rashes and no petechiae  Neuro: alert and oriented X3 no focal deficits    BLOOD SMEAR INTERPRETATION:    RADIOLOGY :

## 2019-07-09 NOTE — CHART NOTE - NSCHARTNOTEFT_GEN_A_CORE
Interval events    Trend CBC                          7.6    5.9   )-----------( 157      ( 09 Jul 2019 01:59 )             23.1     ICU Vital Signs Last 24 Hrs  T(C): 37.1 (08 Jul 2019 20:26), Max: 37.1 (08 Jul 2019 20:26)  T(F): 98.8 (08 Jul 2019 20:26), Max: 98.8 (08 Jul 2019 20:26)  HR: 63 (08 Jul 2019 23:15) (56 - 65)  BP: 104/44 (08 Jul 2019 20:26) (104/44 - 123/61)  BP(mean): --  ABP: --  ABP(mean): --  RR: 18 (08 Jul 2019 20:26) (16 - 18)  SpO2: 95% (08 Jul 2019 23:15) (94% - 99%)    '74 y/o m pmhx HTN, HLD, TATYANA, CKD III and DMII admitted with LAUREN, acute on chronic blood loss anemia  - s/p endoscopy / colonoscopy 7 / 8, gastric mass with visible vessel s/p clip placement  patient now with hb/hct 7.6/23.1, slight trending down from previous CBc, no s/s of bleeding  HD stable  Will transfuse for hb<7.0/pt symptomatic  Discussed with surgery  Potential plan  for Gastric mass resection by surg -onc  Will Trend CBc q6 hourly  Will follow    Jovana Castaneda [facility] [place]  [id number]

## 2019-07-09 NOTE — PROGRESS NOTE ADULT - ASSESSMENT
72 y/o m pmhx HTN, HLD, TATYANA, CKD III and DMII sent in for angiogram given LAUREN/CP found to have symptomatic anemia, now for EGD/cscope 7/8.     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 Ulcerated and excavated with visible vessel s/p clip placement  - s/p endoscopy / colonoscopy 7 / 8   - started on IV Venofer  - HH 8.6 today  - CT A/P with diverticulosis   - started on IV Protonix gtt  - plan for OR with Dr. Stewart Friday July 12 for partial gastrectomy   - GI following  - Surgery following  - Heme / onc following  - patient will need CT abd /  pelvis for further staging purposes    # Dyspnea on exertion  - Suspect secondary to anemia. Less likely cardiac in nature given negative nuclear stress test. EKG nonischemic. PNA unlikely given lack of cough, fever, and PE signs. VTE also unlikely given lack of LE edema, tachycardia, and risk factors.  - CT: interstitial lung disease of undetermined etiology. 1.3 cm short axis subcarinal lymph node (series 3 image 84). Recommend follow-up chest CT in 3 months to determine stability.  - On RA, in no acute distress at this time  - pulm clearance for EGD/colonoscopy appreciated    # Dizziness  - Suspect secondary to anemia. Less likely cardiac in nature given negative nuclear stress test. Orthostatics negative. Exam nonfocal making stroke less likely.   - Anemia workup as above.  - Once workup complete, revisit need for ischemic workup with cardiology.     # Abnormal chest XRAY  - CT: interstitial lung disease of undetermined etiology. 1.3 cm short axis subcarinal lymph node (series 3 image 84).   Recommend follow-up chest CT in 3 months to determine stability.  - pulmonary evaluation appreciated. will follow outpatient.    # CKD (chronic kidney disease) stage 3, GFR 30-59 ml/min  - BUN/Cr ratio suggestive of chronic process. Unknown baseline.   - Scr 1.62-> 1.52-->1.65 --> 1.72 --> 1.7 -> 1.48  - hold hydralazine in setting of worsening Cr    # HTN (hypertension)  -  Continue home medications - labetalol, amlodipine    # Type 2 diabetes mellitus  - LSSI  - consistent carb / dash diet 72 y/o m pmhx HTN, HLD, TATYANA, CKD III and DMII sent in for angiogram given LAUREN/CP found to have symptomatic anemia, now for EGD/cscope 7/8.     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 Ulcerated and excavated with visible vessel s/p clip placement  - s/p endoscopy / colonoscopy 7 / 8   - started on IV Venofer  - HH 8.6 today  - CT A/P with diverticulosis   - started on IV Protonix gtt  - GI following  - Surgery following  - Heme / onc following  - patient will need CT abd /  pelvis for further staging purposes  - plan for OR with Dr. Stewart Friday July 12 for partial gastrectomy     # Dyspnea on exertion  - Suspect secondary to anemia. Less likely cardiac in nature given negative nuclear stress test. EKG nonischemic. PNA unlikely given lack of cough, fever, and PE signs. VTE also unlikely given lack of LE edema, tachycardia, and risk factors.  - CT: interstitial lung disease of undetermined etiology. 1.3 cm short axis subcarinal lymph node (series 3 image 84). Recommend follow-up chest CT in 3 months to determine stability.  - On RA, in no acute distress at this time  - pulm clearance for EGD/colonoscopy appreciated    # Dizziness  - Suspect secondary to anemia. Less likely cardiac in nature given negative nuclear stress test. Orthostatics negative. Exam nonfocal making stroke less likely.   - Anemia workup as above.  - Once workup complete, revisit need for ischemic workup with cardiology.     # Abnormal chest XRAY  - CT: interstitial lung disease of undetermined etiology. 1.3 cm short axis subcarinal lymph node (series 3 image 84).   Recommend follow-up chest CT in 3 months to determine stability.  - pulmonary evaluation appreciated. will follow outpatient.    # CKD (chronic kidney disease) stage 3, GFR 30-59 ml/min  - BUN/Cr ratio suggestive of chronic process. Unknown baseline.   - Scr 1.62-> 1.52-->1.65 --> 1.72 --> 1.7 -> 1.48  - hold hydralazine in setting of worsening Cr    # HTN (hypertension)  -  Continue home medications - labetalol, amlodipine    # Type 2 diabetes mellitus  - LSSI  - consistent carb / dash diet 72 y/o m pmhx HTN, HLD, TATYANA, CKD III and DMII sent in for angiogram given LAUREN/CP found to have symptomatic anemia, now for EGD/cscope 7/8.     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 Ulcerated and excavated with visible vessel s/p clip placement  - s/p endoscopy / colonoscopy 7 / 8   - started on IV Venofer  - HH 7.6 today  - CT A/P with diverticulosis   - started on IV Protonix gtt  - GI following  - Surgery following  - Heme / onc following  - patient will need CT abd /  pelvis for further staging purposes  - plan for OR with Dr. Stewart Friday July 12 for partial gastrectomy     # Dyspnea on exertion  - Suspect secondary to anemia. Less likely cardiac in nature given negative nuclear stress test. EKG nonischemic. PNA unlikely given lack of cough, fever, and PE signs. VTE also unlikely given lack of LE edema, tachycardia, and risk factors.  - CT: interstitial lung disease of undetermined etiology. 1.3 cm short axis subcarinal lymph node (series 3 image 84). Recommend follow-up chest CT in 3 months to determine stability.  - On RA, in no acute distress at this time  - pulm clearance for EGD/colonoscopy appreciated    # Dizziness  - Suspect secondary to anemia. Less likely cardiac in nature given negative nuclear stress test. Orthostatics negative. Exam nonfocal making stroke less likely.   - Anemia workup as above.  - Once workup complete, revisit need for ischemic workup with cardiology.     # Abnormal chest XRAY  - CT: interstitial lung disease of undetermined etiology. 1.3 cm short axis subcarinal lymph node (series 3 image 84).   Recommend follow-up chest CT in 3 months to determine stability.  - pulmonary evaluation appreciated. will follow outpatient.    # CKD (chronic kidney disease) stage 3, GFR 30-59 ml/min  - BUN/Cr ratio suggestive of chronic process. Unknown baseline.   - Scr 1.62-> 1.52-->1.65 --> 1.72 --> 1.7 -> 1.48  - hold hydralazine in setting of worsening Cr    # HTN (hypertension)  -  Continue home medications - labetalol, amlodipine    # Type 2 diabetes mellitus  - LSSI  - consistent carb / dash diet

## 2019-07-09 NOTE — PROGRESS NOTE ADULT - SUBJECTIVE AND OBJECTIVE BOX
Chief Complaint:  Patient is a 73y old  Male who presents with a chief complaint of cardiac cath/anemia (2019 19:03)      Interval Events: No events overnight.  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  Colon with melena and diverticula. Spoke extensively with patient and family members.     Allergies:  No Known Allergies      Hospital Medications:  amLODIPine   Tablet 10 milliGRAM(s) Oral daily  atorvastatin 20 milliGRAM(s) Oral at bedtime  insulin lispro (HumaLOG) corrective regimen sliding scale   SubCutaneous three times a day before meals  iron sucrose IVPB 200 milliGRAM(s) IV Intermittent every 24 hours  labetalol 200 milliGRAM(s) Oral two times a day  losartan 50 milliGRAM(s) Oral daily  pantoprazole Infusion 8 mG/Hr IV Continuous <Continuous>  sodium chloride 0.9% lock flush 3 milliLiter(s) IV Push every 8 hours      PMHX/PSHX:  TATYANA (obstructive sleep apnea)  Mitral regurgitation  Diabetes  HLD (hyperlipidemia)  HTN (hypertension)  HTN (hypertension)  Abdominal hernia  Uncontrolled hypertension  Snoring  Mass  Tooth decay  S/P tonsillectomy      Family history:      ROS:     General:  No wt loss, fevers, chills, night sweats, fatigue,   Eyes:  Good vision, no reported pain  ENT:  No sore throat, pain, runny nose, dysphagia  CV:  No pain, palpitations, hypo/hypertension  Resp:  No dyspnea, cough, tachypnea, wheezing  GI:  See HPI  :  No pain, bleeding, incontinence, nocturia  Muscle:  No pain, weakness  Neuro:  No weakness, tingling, memory problems  Psych:  No fatigue, insomnia, mood problems, depression  Endocrine:  No polyuria, polydipsia, cold/heat intolerance  Heme:  No petechiae, ecchymosis, easy bruisability  Skin:  No rash, edema      PHYSICAL EXAM:     GENERAL: NAD  HEENT:  NC/AT  CHEST:  Full & symmetric excursion, no increased effort  ABDOMEN:  Soft, non-tender, non-distended, +BS  EXTREMITIES:  no edema  SKIN:  No rash  NEURO:  Alert      Vital Signs:  Vital Signs Last 24 Hrs  T(C): 36.9 (2019 04:58), Max: 37.1 (2019 20:26)  T(F): 98.5 (2019 04:58), Max: 98.8 (2019 20:26)  HR: 56 (2019 05:13) (52 - 65)  BP: 113/66 (2019 04:58) (104/44 - 123/61)  BP(mean): --  RR: 16 (2019 04:58) (16 - 18)  SpO2: 96% (2019 05:13) (94% - 99%)  Daily     Daily Weight in k (2019 07:47)    LABS:                        7.6    5.2   )-----------( 152      ( 2019 05:30 )             22.3     07-09    143  |  104  |  21  ----------------------------<  97  4.0   |  28  |  1.51<H>    Ca    8.7      2019 05:30  Phos  2.9     07-09  Mg     2.4     07-09                Imaging:  < from: Upper Endoscopy (19 @ 14:27) >    [facility]  ____________________________________________________________________________________________________  Patient Name: Robert Pittman                     MRN: [id number]  Account Number: [id number]                     YOB: 1945  Room: Endoscopy Room 1                           Gender: Male  Attending MD: Kyle Lozoya ,                  Procedure Date No Time: 2019  ____________________________________________________________________________________________________     Procedure:           Upper GI endoscopy  Indications:         Iron deficiency anemia  Providers:           Marisabel Busby (Fellow), Juan Manuel Gordon MD  Medicines:           Monitored Anesthesia Care  Complications:       No immediate complications.  ____________________________________________________________________________________________________  Procedure:           After obtaining informed consent, the endoscope was passed under direct              vision. Throughout the procedure, the patient's blood pressure, pulse, and                        oxygen saturations were monitored continuously. The Endoscope was introduced                        through the mouth, and advanced to the second part of duodenum. The upper GI                        endoscopy was technically difficult and complex due to abnormal anatomy. The                        patient tolerated the procedure well.                                                  Findings:       The examined esophagus was normal.       Clotted blood was found in the cardia and in the gastric fundus. This was removed with        lavage, greene net, and aggressive suctioning with both regular and therapeutic scopes       A large, submucosal and ulcerated and excavated, non-circumferential mass with oozing        bleeding and stigmata of recent bleeding was found on the lesser curvature of the stomach -        specifically a visible vessel adn oozing at edge of ulcer. For hemostasis, one hemostatic        clip was successfully placed over the hemostat clip. Hemostat clip placement was achieved        using a side-viewing scope given the challenging anatomy. An attempt to use HEMOSTAT spary        was made twice, which was unable to be discharged with the side viewing scope. There was no        active oozing/bleeding from the ulcearted lesion at time of completion. No biopsies taken at        this time.       The examined duodenum was normal.       At end of procedure, no active bleeding seen throughout study                                                                                                        Impression:          - Clotted blood in the cardia and in the gastric fundus. Removed.                       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                        Ulcerated and excavated with visible vessell s/p clip placement (via                        duodenoscope)  Recommendation:      - Will proceed with colonoscopy for completeness                       - Abdominal CT for staging                       - Surgical Oncology consult for possible resection                       - IV PPI drip  - If rebleeding occurs - will consider repeat EGD vs. IR embolization                                                                                                        Attending Participation:       I was present and participated during the entire procedure, including non-key portions.                                                                                                          _________________  Kyle Lozoya,   2019 4:50:42 PM    ______________________  Juan Manuel DANIEL Gordon MD  Number of Addenda: 0    Note Initiated On: 2019 2:27 PM    < end of copied text >      < from: Colonoscopy (19 @ 14:25) >    [facility]  ____________________________________________________________________________________________________  Patient Name: Robert Pittman                     MRN: [id number]  Account Number: [id number]                     YOB: 1945  Room: Endoscopy Room 1                           Gender: Male  Attending MD: Kyle Lozoya ,                  Procedure Date No Time: 2019  ____________________________________________________________________________________________________     Procedure:           Colonoscopy  Indications:         Iron deficiency anemia  Providers:           Marisabel Busby (Fellow)  Medicines:           Monitored Anesthesia Care  Complications:       No immediate complications.  ____________________________________________________________________________________________________  Procedure:           Pre-Anesthesia Assessment:                       - Prior to the procedure, a History and Physical was performed, and patient                        medications and allergies were reviewed. The patient is competent. The risks                        and benefits of the procedure and the sedation options and risks were                        discussed with the patient. All questions were answered and informed consent                        was obtained. Patient identification and proposed procedure were verified by                        the physician, the nurse and the anesthetist in the endoscopy suite.                   Prophylactic Antibiotics: The patient does not require prophylactic                        antibiotics. Prior Anticoagulants: The patient has taken no previous                        anticoagulant or antiplatelet agents. ASA Grade Assessment: III - A patient                        with severe systemic disease. After reviewing the risks and benefits, the                        patient was deemed in satisfactory condition to undergo the procedure. The                        anesthesia plan was to use monitored anesthesia care (MAC). Immediately prior                        to administration of medications, the patient was re-assessed for adequacy to                        receive sedatives. The heart rate, respiratory rate, oxygen saturations,                        blood pressure, adequacy of pulmonary ventilation, and response to care were                        monitored throughout the procedure. The physical status of the patient was                        re-assessed after the procedure.                       After I obtained informed consent, the scope was passed under direct vision.                        Throughout the procedure, the patient's blood pressure, pulse, and oxygen                        saturations were monitored continuously. The Colonoscope was introduced                        through the anus and advanced to the terminal ileum. The colonoscopy was                        performed without difficulty. The patient tolerated the procedure well. The                        quality of the bowel preparation was evaluated using the BBPS (Stephenson Bowel                        Preparation Scale) with scores of: Right Colon = 1 (portion of mucosa seen,                        but other areas not well seen due to staining, residual stool and/or opaque                        liquid), Transverse Colon = 1 (portion of mucosa seen, but other areas not                        well seen due to staining, residual stool and/or opaque liquid) and Left                 Colon = 1 (portion of mucosa seen, but other areas not well seen due to                        staining, residual stool and/or opaque liquid). The total BBPS score equals                        3. The quality of the bowel preparation was inadequate.                                                                                                        Findings:       - Hematin (altered blood/coffee-ground-like material) was found in the entire colon.       - No large massed identified. Prep not adequate to identify polyps <6mm.       Many small and large-mouthed diverticula were found in the left colon.       - The terminal ileum appeared normal.       - Small non-bleeding internal hemorrhoids seen on retroflexion.                                                                                          Impression:          - Hemorrhoids.                       - Diverticulosis in the left colon.                       - Otherwise normal colonsocopy  Recommendation:      - Return patient to hospital [facility] for ongoing care.                       - Repeat colonoscopy within 1 year because the bowel preparation was                        suboptimal for screening/surveillance purposes                       - Diet astolerated                                                                                                        Attending Participation:       I was present and participated during the entire procedure, including non-key portions.                                                                                        _________________  Kyle Lozoya,   2019 6:39:42 PM  Number of Addenda: 0    Note Initiated On: 2019 2:25 PM    < end of copied text >

## 2019-07-09 NOTE — PROGRESS NOTE ADULT - ASSESSMENT
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  ~3cm on proximal lesser curve of stomach  2) Iron deficiency anemia - likely secondary to oozing GIST  3) Hypertension  4) TATYANA  5) T2DM  6) CKDIII    Recs:  - followup surgical oncology recommendations, appreciate input  - trend CBC, transfuse if Hgb <7  - diet per surgery (ok to advance from GI perspective)  - nausea and pain management PRN  - would give throat lozenge for sore throat secondary to endoscopy yesterday

## 2019-07-09 NOTE — PROGRESS NOTE ADULT - SUBJECTIVE AND OBJECTIVE BOX
Patient is a 73y old  Male who presents with a chief complaint of symptomatic anemia (06 Jul 2019 11:55)    SUBJECTIVE / OVERNIGHT EVENTS:  No new complaints. hemodynamically stable. Denies any HA, CP, SOB.  No abdominal pain, N/V/D.     REVIEW OF SYSTEMS:  General: NAD, hemodynamically stable   HEENT:  Eyes:  No visual loss  SKIN:  No rash or itching.  CARDIOVASCULAR:  No chest pain   RESPIRATORY:  No shortness of breath   GASTROINTESTINAL:  no dark stools  NEUROLOGICAL:  No headache, dizziness, syncope, paralysis, ataxia, numbness or tingling in the extremities. No change in bowel or bladder control.  MUSCULOSKELETAL:  No muscle, back pain, joint pain or stiffness.  HEMATOLOGIC:  + anemia  LYMPHATICS:  No enlarged nodes. No history of splenectomy.  ENDOCRINOLOGIC:  No reports of sweating, cold or heat intolerance. No polyuria or polydipsia.  ALLERGIES:  No history of asthma, hives, eczema or rhinitis.    PHYSICAL EXAM:  T(C): 37.2 (09 Jul 2019 12:56), Max: 37.2 (09 Jul 2019 12:56)  T(F): 98.9 (09 Jul 2019 12:56), Max: 98.9 (09 Jul 2019 12:56)  HR: 74 (09 Jul 2019 12:56) (52 - 74)  BP: 94/54 (09 Jul 2019 12:56) (94/54 - 119/57)  RR: 18 (09 Jul 2019 12:56) (16 - 18)  SpO2: 96% (09 Jul 2019 12:56) (95% - 99%)    GENERAL:  Well appearing M, in NAD  HEAD:  NCAT  EYES: PERRLA  NECK: Supple, No JVD  CHEST/LUNG: CTA B/L. No w/r/r.  HEART: Reg rate. Normal S1, S2. No m/r/g.   ABDOMEN: SNT, obese  EXTREMITIES:  2+ Peripheral Pulses, No clubbing, cyanosis, edema.  PSYCH: appropriate affect    LABS:               CBC Full  -  ( 09 Jul 2019 05:30 )  WBC Count : 5.2 K/uL  RBC Count : 2.52 M/uL  Hemoglobin : 7.6 g/dL  Hematocrit : 22.3 %  Platelet Count - Automated : 152 K/uL  Mean Cell Volume : 88.6 fl  Mean Cell Hemoglobin : 30.0 pg  Mean Cell Hemoglobin Concentration : 33.8 gm/dL  Auto Neutrophil # : x  Auto Lymphocyte # : x  Auto Monocyte # : x  Auto Eosinophil # : x  Auto Basophil # : x  Auto Neutrophil % : x  Auto Lymphocyte % : x  Auto Monocyte % : x  Auto Eosinophil % : x  Auto Basophil % : x        07-09    143  |  104  |  21  ----------------------------<  97  4.0   |  28  |  1.51<H>    Ca    8.7      09 Jul 2019 05:30  Phos  2.9     07-09  Mg     2.4     07-09 Patient is a 73y old  Male who presents with a chief complaint of symptomatic anemia (06 Jul 2019 11:55)    SUBJECTIVE / OVERNIGHT EVENTS:  No new complaints. hemodynamically stable. Denies any HA, CP, SOB.  No abdominal pain, N/V/D. Endoscopy evaluation results have been provided to the patient's family.     REVIEW OF SYSTEMS:  General: NAD, hemodynamically stable   HEENT:  Eyes:  No visual loss  SKIN:  No rash or itching.  CARDIOVASCULAR:  No chest pain   RESPIRATORY:  No shortness of breath   GASTROINTESTINAL:  no dark stools  NEUROLOGICAL:  No headache, dizziness, syncope, paralysis, ataxia, numbness or tingling in the extremities. No change in bowel or bladder control.  MUSCULOSKELETAL:  No muscle, back pain, joint pain or stiffness.  HEMATOLOGIC:  + anemia  LYMPHATICS:  No enlarged nodes. No history of splenectomy.  ENDOCRINOLOGIC:  No reports of sweating, cold or heat intolerance. No polyuria or polydipsia.  ALLERGIES:  No history of asthma, hives, eczema or rhinitis.    PHYSICAL EXAM:  T(C): 37.2 (09 Jul 2019 12:56), Max: 37.2 (09 Jul 2019 12:56)  T(F): 98.9 (09 Jul 2019 12:56), Max: 98.9 (09 Jul 2019 12:56)  HR: 74 (09 Jul 2019 12:56) (52 - 74)  BP: 94/54 (09 Jul 2019 12:56) (94/54 - 119/57)  RR: 18 (09 Jul 2019 12:56) (16 - 18)  SpO2: 96% (09 Jul 2019 12:56) (95% - 99%)    GENERAL:  Well appearing M, in NAD  HEAD:  NCAT  EYES: PERRLA  NECK: Supple, No JVD  CHEST/LUNG: CTA B/L. No w/r/r.  HEART: Reg rate. Normal S1, S2. No m/r/g.   ABDOMEN: SNT, obese  EXTREMITIES:  2+ Peripheral Pulses, No clubbing, cyanosis, edema.  PSYCH: appropriate affect    LABS:               CBC Full  -  ( 09 Jul 2019 05:30 )  WBC Count : 5.2 K/uL  RBC Count : 2.52 M/uL  Hemoglobin : 7.6 g/dL  Hematocrit : 22.3 %  Platelet Count - Automated : 152 K/uL  Mean Cell Volume : 88.6 fl  Mean Cell Hemoglobin : 30.0 pg  Mean Cell Hemoglobin Concentration : 33.8 gm/dL  Auto Neutrophil # : x  Auto Lymphocyte # : x  Auto Monocyte # : x  Auto Eosinophil # : x  Auto Basophil # : x  Auto Neutrophil % : x  Auto Lymphocyte % : x  Auto Monocyte % : x  Auto Eosinophil % : x  Auto Basophil % : x        07-09    143  |  104  |  21  ----------------------------<  97  4.0   |  28  |  1.51<H>    Ca    8.7      09 Jul 2019 05:30  Phos  2.9     07-09  Mg     2.4     07-09

## 2019-07-09 NOTE — CONSULT NOTE ADULT - PROBLEM SELECTOR RECOMMENDATION 9
-Trend CBC. Transfuse as needed.   -Diet as per GI  -Will likely need surgical intervention for resection of GIST.   -Discussed with Dr. Stewart  -Surgery will follow.
- On admission Cr 1.6, peaked 1.72 and currently 1.51  - patient state able make good urine output    PLAN:   - per Benigno Score for Post-PCI Contrast Nephropathy;   -  risk of contrast-induced nephropathy (FRANK) after percutaneous coronary intervention (PCI) = 14%  - risk of post FRANK requiring hemodialysis = 0.12%

## 2019-07-09 NOTE — CONSULT NOTE ADULT - SUBJECTIVE AND OBJECTIVE BOX
[facility] DIVISION OF KIDNEY DISEASES AND HYPERTENSION -- INITIAL CONSULT NOTE  --------------------------------------------------------------------------------  HPI:  73M PMHx CKD3, TATYANA, HTN, HLD, DM2 who present on 7/2 for elective cardiac angiogram w/ preop lab (prior [facility]) significant for acute anemia (8.4, baseline 12 in May 2019) thereby admit to medicine for anemia workup.  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      Nephrology consulted for risk stratification of contrast induced nephropathy and/or requiring dialysis.    Upon review of sunrise, no prior Cr level.  On admission Cr 1.6, peaked 1.72 and currently 1.51 - patient state able make good urine output      PAST HISTORY  --------------------------------------------------------------------------------  PAST MEDICAL & SURGICAL HISTORY:  TATYANA (obstructive sleep apnea)  Mitral regurgitation  Diabetes  HLD (hyperlipidemia)  HTN (hypertension)  Abdominal hernia  Uncontrolled hypertension: BP ranged 170-250/--asymptomatic. Sent from PAST office on 8/12/13 to [facility] via ambulance  Snoring: TATYANA precautions--responds affirmatively to STOP BANG questionnaire--admits to loud snoring and daytime somnolence; witnessed apneic events by wife; age &gt; 50; gender, male  Mass: right foot soft tissue mass in August 2013  Tooth decay: surgery for tooth extraction  S/P tonsillectomy    FAMILY HISTORY:    PAST SOCIAL HISTORY:    ALLERGIES & MEDICATIONS  --------------------------------------------------------------------------------  Allergies    No Known Allergies    Intolerances    Standing Inpatient Medications  amLODIPine   Tablet 10 milliGRAM(s) Oral daily  atorvastatin 20 milliGRAM(s) Oral at bedtime  insulin lispro (HumaLOG) corrective regimen sliding scale   SubCutaneous three times a day before meals  iron sucrose IVPB 200 milliGRAM(s) IV Intermittent every 24 hours  labetalol 200 milliGRAM(s) Oral two times a day  losartan 50 milliGRAM(s) Oral daily  pantoprazole Infusion 8 mG/Hr IV Continuous <Continuous>  sodium chloride 0.9% lock flush 3 milliLiter(s) IV Push every 8 hours    PRN Inpatient Medications      REVIEW OF SYSTEMS  --------------------------------------------------------------------------------  Gen: No weight changes, fatigue, fevers/chills, weakness  Skin: No rashes  Respiratory: No dyspnea, cough, wheezing, hemoptysis  CV: No chest pain, PND, orthopnea  GI: No abdominal pain, diarrhea, constipation, nausea, vomiting, melena, hematochezia  : No increased frequency, dysuria, hematuria, nocturia  MSK: No joint pain/swelling; no back pain; no edema  Neuro: No dizziness/lightheadedness, weakness  Heme: No easy bruising or bleeding      All other systems were reviewed and are negative, except as noted.    VITALS/PHYSICAL EXAM  --------------------------------------------------------------------------------  T(C): 37.2 (07-09-19 @ 12:56), Max: 37.2 (07-09-19 @ 12:56)  HR: 65 (07-09-19 @ 17:40) (52 - 74)  BP: 113/63 (07-09-19 @ 17:40) (94/54 - 119/57)  RR: 16 (07-09-19 @ 15:04) (16 - 18)  SpO2: 95% (07-09-19 @ 15:05) (95% - 97%)  Wt(kg): --    Weight (kg): 119.8 (07-07-19 @ 21:22)      07-08-19 @ 07:01  -  07-09-19 @ 07:00  --------------------------------------------------------  IN: 230 mL / OUT: 0 mL / NET: 230 mL      Physical Exam:  	Gen: NAD, well-appearing  	HEENT: no JVD  	Pulm: CTA B/L  	CV: RRR, S1S2; no rub  	Abd: +BS, soft, nontender/nondistended  	Ext: no edema  LABS/STUDIES  --------------------------------------------------------------------------------              7.6    5.2   >-----------<  152      [07-09-19 @ 05:30]              22.3     143  |  104  |  21  ----------------------------<  97      [07-09-19 @ 05:30]  4.0   |  28  |  1.51        Ca     8.7     [07-09-19 @ 05:30]      Mg     2.4     [07-09-19 @ 05:30]      Phos  2.9     [07-09-19 @ 05:30]            Creatinine Trend:  SCr 1.51 [07-09 @ 05:30]  SCr 1.48 [07-08 @ 05:36]  SCr 1.69 [07-07 @ 06:43]  SCr 1.72 [07-06 @ 06:56]  SCr 1.65 [07-05 @ 06:33]        Iron 40, TIBC 318, %sat 13      [07-03-19 @ 09:21]  Ferritin 17      [07-03-19 @ 11:39]  HbA1c 5.4      [07-03-19 @ 11:17]  TSH 2.99      [07-03-19 @ 11:39]      Rheumatoid Factor <10      [07-06-19 @ 10:09]  ANCA: cANCA Negative, pANCA Negative, atypical ANCA Negative      [07-06-19 @ 12:15] [facility] DIVISION OF KIDNEY DISEASES AND HYPERTENSION -- INITIAL CONSULT NOTE  --------------------------------------------------------------------------------  HPI:  73M PMHx CKD3, TATYANA, HTN, HLD, DM2 who present on 7/2 for elective cardiac angiogram w/ preop lab (prior [facility]) significant for acute anemia (8.4, baseline 12 in May 2019) thereby admit to medicine for anemia workup.  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      Nephrology consulted for risk stratification of contrast induced nephropathy and/or requiring dialysis.    Upon review of sunrise, no prior Cr level.  On admission Cr 1.6, peaked 1.72 and currently 1.51 - patient state able make good urine output      PAST HISTORY  --------------------------------------------------------------------------------  PAST MEDICAL & SURGICAL HISTORY:  TATYANA (obstructive sleep apnea)  Mitral regurgitation  Diabetes  HLD (hyperlipidemia)  HTN (hypertension)  Abdominal hernia  Uncontrolled hypertension: BP ranged 170-250/--asymptomatic. Sent from PAST office on 8/12/13 to [facility] via ambulance  Snoring: TATYANA precautions--responds affirmatively to STOP BANG questionnaire--admits to loud snoring and daytime somnolence; witnessed apneic events by wife; age &gt; 50; gender, male  Mass: right foot soft tissue mass in August 2013  Tooth decay: surgery for tooth extraction  S/P tonsillectomy    FAMILY HISTORY:    PAST SOCIAL HISTORY:    ALLERGIES & MEDICATIONS  --------------------------------------------------------------------------------  Allergies    No Known Allergies    Intolerances    Standing Inpatient Medications  amLODIPine   Tablet 10 milliGRAM(s) Oral daily  atorvastatin 20 milliGRAM(s) Oral at bedtime  insulin lispro (HumaLOG) corrective regimen sliding scale   SubCutaneous three times a day before meals  iron sucrose IVPB 200 milliGRAM(s) IV Intermittent every 24 hours  labetalol 200 milliGRAM(s) Oral two times a day  losartan 50 milliGRAM(s) Oral daily  pantoprazole Infusion 8 mG/Hr IV Continuous <Continuous>  sodium chloride 0.9% lock flush 3 milliLiter(s) IV Push every 8 hours    PRN Inpatient Medications      REVIEW OF SYSTEMS  --------------------------------------------------------------------------------  Gen: No fevers/chills  Respiratory: No dyspnea, cough  CV: No chest pain  GI: No abdominal pain, diarrhea, constipation, nausea, vomiting  MSK: no edema      All other systems were reviewed and are negative, except as noted.    VITALS/PHYSICAL EXAM  --------------------------------------------------------------------------------  T(C): 37.2 (07-09-19 @ 12:56), Max: 37.2 (07-09-19 @ 12:56)  HR: 65 (07-09-19 @ 17:40) (52 - 74)  BP: 113/63 (07-09-19 @ 17:40) (94/54 - 119/57)  RR: 16 (07-09-19 @ 15:04) (16 - 18)  SpO2: 95% (07-09-19 @ 15:05) (95% - 97%)  Wt(kg): --    Weight (kg): 119.8 (07-07-19 @ 21:22)      07-08-19 @ 07:01  -  07-09-19 @ 07:00  --------------------------------------------------------  IN: 230 mL / OUT: 0 mL / NET: 230 mL      Physical Exam:  	Gen: NAD, well-appearing  	HEENT: no JVD  	Pulm: CTA B/L  	CV: RRR, S1S2; no rub  	Abd: +BS, soft, nontender/nondistended  	Ext: no edema    LABS/STUDIES  --------------------------------------------------------------------------------              7.6    5.2   >-----------<  152      [07-09-19 @ 05:30]              22.3     143  |  104  |  21  ----------------------------<  97      [07-09-19 @ 05:30]  4.0   |  28  |  1.51        Ca     8.7     [07-09-19 @ 05:30]      Mg     2.4     [07-09-19 @ 05:30]      Phos  2.9     [07-09-19 @ 05:30]            Creatinine Trend:  SCr 1.51 [07-09 @ 05:30]  SCr 1.48 [07-08 @ 05:36]  SCr 1.69 [07-07 @ 06:43]  SCr 1.72 [07-06 @ 06:56]  SCr 1.65 [07-05 @ 06:33]        Iron 40, TIBC 318, %sat 13      [07-03-19 @ 09:21]  Ferritin 17      [07-03-19 @ 11:39]  HbA1c 5.4      [07-03-19 @ 11:17]  TSH 2.99      [07-03-19 @ 11:39]      Rheumatoid Factor <10      [07-06-19 @ 10:09]  ANCA: cANCA Negative, pANCA Negative, atypical ANCA Negative      [07-06-19 @ 12:15]

## 2019-07-09 NOTE — CONSULT NOTE ADULT - ASSESSMENT
73M PMHx CKD3, TATYANA, HTN, HLD, DM2 who present on 7/2 for elective cardiac angiogram w/ preop lab (prior [facility]) significant for acute anemia (8.4, baseline 12 in May 2019) thereby admit to medicine for anemia workup.  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      Nephrology consulted for risk stratification of contrast induced nephropathy and/or requiring dialysis. On admission Cr 1.6, peaked 1.72 and currently 1.51 - patient state able make good urine output

## 2019-07-09 NOTE — PROGRESS NOTE ADULT - ASSESSMENT
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     - continue to trend CBC and transfuse as needed  - Diet as per GI  - plan for OR with Dr. Stewart Friday July 12 for partial gastrectomy - will require medical clearance  - will continue to follow    [id number]

## 2019-07-09 NOTE — PROGRESS NOTE ADULT - SUBJECTIVE AND OBJECTIVE BOX
SUBJECTIVE: Pt seen and examined at bedside. Denies pain, nausea, vomiting. Reports having darker than normal stools over the last few weeks, denies bright red blood per rectum. No other complaints      MEDICATIONS  (STANDING):  amLODIPine   Tablet 10 milliGRAM(s) Oral daily  atorvastatin 20 milliGRAM(s) Oral at bedtime  insulin lispro (HumaLOG) corrective regimen sliding scale   SubCutaneous three times a day before meals  iron sucrose IVPB 200 milliGRAM(s) IV Intermittent every 24 hours  labetalol 200 milliGRAM(s) Oral two times a day  losartan 50 milliGRAM(s) Oral daily  pantoprazole Infusion 8 mG/Hr (10 mL/Hr) IV Continuous <Continuous>  sodium chloride 0.9% lock flush 3 milliLiter(s) IV Push every 8 hours    MEDICATIONS  (PRN):      OBJECTIVE:    Vital Signs Last 24 Hrs  T(C): 36.9 (09 Jul 2019 04:58), Max: 37.1 (08 Jul 2019 20:26)  T(F): 98.5 (09 Jul 2019 04:58), Max: 98.8 (08 Jul 2019 20:26)  HR: 62 (09 Jul 2019 10:11) (52 - 65)  BP: 119/57 (09 Jul 2019 10:11) (104/44 - 123/61)  BP(mean): --  RR: 16 (09 Jul 2019 10:10) (16 - 18)  SpO2: 96% (09 Jul 2019 10:11) (94% - 99%)    General Appearance: NAD  Neck: Supple  Chest: non-labored breathing, no respiratory distress  CV: Pulse regular presently  Abdomen: Soft, non-tender, non-distended  Extremities: warm and well perfused    I&O's Summary    08 Jul 2019 07:01  -  09 Jul 2019 07:00  --------------------------------------------------------  IN: 230 mL / OUT: 0 mL / NET: 230 mL      I&O's Detail    08 Jul 2019 07:01  -  09 Jul 2019 07:00  --------------------------------------------------------  IN:    Oral Fluid: 120 mL    pantoprazole Infusion: 110 mL  Total IN: 230 mL    OUT:  Total OUT: 0 mL    Total NET: 230 mL            LABS:                        7.6    5.2   )-----------( 152      ( 09 Jul 2019 05:30 )             22.3     07-09    143  |  104  |  21  ----------------------------<  97  4.0   |  28  |  1.51<H>    Ca    8.7      09 Jul 2019 05:30  Phos  2.9     07-09  Mg     2.4     07-09            RADIOLOGY & ADDITIONAL STUDIES:    CT chest/abdomen/pelvis: Reviewed. CT Chest: b/l lower lobe mild fibrosis. CT Abd/Pelvis: diverticulosis    Endoscopy: 2 cm submucosal mass with ulcerated center, visible vessel (s/p clip)  Colonoscopy: pending report    Stool guaiac negative

## 2019-07-09 NOTE — PROGRESS NOTE ADULT - ASSESSMENT
A/P: 72 yo man with a PMHx of HTN, HLD, TATYANA, CKD III and DM2, who originally presented on 7/2 for elective cardiac angiogram. Preop labs prior to [facility] showed hemoglobin of 8.4 from 12 (this past May). Patient transferred to medicine for management and workup of anemia prior to any potential ischemic workup. Seen and evaluated by GI. Cardiology consulted for preoperative risk assessment prior to EGD.    - clinically stable with no chest pain  - s/p colonoscopy, surg onc consulted for gastric mass  - normal NST in 6/2019, symptoms likely 2/2 anemia and no further ischemic eval warranted  - signing off, please call with further questions    Marquis Darden, #[phone]  Cardiology Fellow A/P: 72 yo man with a PMHx of HTN, HLD, TATYANA, CKD III and DM2, who originally presented on 7/2 for elective cardiac angiogram. Preop labs prior to [facility] showed hemoglobin of 8.4 from 12 (this past May). Patient transferred to medicine for management and workup of anemia prior to any potential ischemic workup. Seen and evaluated by GI. Cardiology consulted for preoperative risk assessment prior to EGD.    - clinically stable with no chest pain  - s/p colonoscopy, surg onc consulted for gastric mass  - normal NST in 6/2019, symptoms likely 2/2 anemia and no further ischemic eval warranted  - pt is optimized from cardiac standpoint and may proceed with gi work-up  - signing off, please call with further questions    Marquis Darden, #[phone]  Cardiology Fellow A/P: 72 yo man with a PMHx of HTN, HLD, TATYANA, CKD III and DM2, who originally presented on 7/2 for elective cardiac angiogram. Preop labs prior to [facility] showed hemoglobin of 8.4 from 12 (this past May). Patient transferred to medicine for management and workup of anemia prior to any potential ischemic workup. Seen and evaluated by GI. Cardiology consulted for preoperative risk assessment prior to EGD.    - clinically stable with no chest pain  - s/p colonoscopy, surg onc consulted for gastric mass  - normal NST in 6/2019, symptoms likely 2/2 anemia and no further ischemic eval warranted  - pt is optimized from cardiac standpoint and may proceed with gastric resection  - signing off, please call with further questions    Marquis Darden, #[phone]  Cardiology Fellow

## 2019-07-09 NOTE — PROGRESS NOTE ADULT - ASSESSMENT
7/5/19 the p over the last few weeks has been weak dizzy and easily fatigued. He went to see his pmd and the hemoglobin was noted to be in the 8 range where a few months earlier it was 12. He has been under the care of Dr Marc Mai and his hemoglobin was always about 12. He may have been experiencing dark stools over the last few weeks and has not been taking iron pills. The guaiac here was negative for blood. The pt was seen by pulmonary here for interstitial changes in the lungs and will have a ct scan to see if further studies are needed. He was a former . The GFR was 41 and bun was 26 and the white cell count was 6.6 hemoglobin 8.1 and MCV of 91 and MCHC of 29.8 and rdw of 16.5 . The ferritin was only 17 and this suggests iron deficiency anemia and he is to have a EGD and colonoscopy and I will give him iv iron to see if there is a response and he can follow with Dr Mai from heme onc.  7/8 pt receiving iv iron and gi eval in process, hemoglobin was 8.6.  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 The iron continues and will continue to check the cbc for now.

## 2019-07-09 NOTE — CONSULT NOTE ADULT - CONSULT REASON
potential GIST in lesser curvature of stomach
Anemia
Preoperative risk assessment
progressive anemia over weeks and ferritin of only 17
risk of contrast induced nephropathy/requiring dialysis
opacities

## 2019-07-09 NOTE — PROGRESS NOTE ADULT - SUBJECTIVE AND OBJECTIVE BOX
Cardiology Progress Note    Interval: Pt resting comfortably in bed. Denied chest pain.    Tele:    HPI:  72 yo  male (no implantable devices) PMH HTN, HLD, TATYANA, CKD III and DMT2 (a1c June 2019, managed by PCP, uncomplicated) presents for cardiac angiogram. Patient reports occasional episodes of moderate chest discomfort associated with dizziness, dyspnea and lightheadedness with moderate exertion x 2 weeks ago. Seen and evaluated by Dr. Mortensen (cards) recommended to have nuclear stress test which was completed on 6/25/19 revealing normal results, normal EF and wall motion, diaphragmatic attenuation. Symptoms still persist- recommends to have LHC for further ischemic evaluation. (02 Jul 2019 09:24)      Medications:  amLODIPine   Tablet 10 milliGRAM(s) Oral daily  atorvastatin 20 milliGRAM(s) Oral at bedtime  insulin lispro (HumaLOG) corrective regimen sliding scale   SubCutaneous three times a day before meals  iron sucrose IVPB 200 milliGRAM(s) IV Intermittent every 24 hours  labetalol 200 milliGRAM(s) Oral two times a day  losartan 50 milliGRAM(s) Oral daily  pantoprazole Infusion 8 mG/Hr IV Continuous <Continuous>  sodium chloride 0.9% lock flush 3 milliLiter(s) IV Push every 8 hours      Review of Systems:  Constitutional: [ ] Fever [ ] Chills [ ] Fatigue [ ] Weight change   HEENT: [ ] Blurred vision [ ] Eye Pain [ ] Headache [ ] Runny nose [ ] Sore Throat   Respiratory: [ ] Cough [ ] Wheezing [ ] Shortness of breath  Cardiovascular: [ ] Chest Pain [ ] Palpitations [ ] LAUREN [ ] PND [ ] Orthopnea  Gastrointestinal: [ ] Abdominal Pain [ ] Diarrhea [ ] Constipation [ ] Hemorrhoids [ ] Nausea [ ] Vomiting  Genitourinary: [ ] Nocturia [ ] Dysuria [ ] Incontinence  Extremities: [ ] Swelling [ ] Joint Pain  Neurologic: [ ] Focal deficit [ ] Paresthesias [ ] Syncope  Lymphatic: [ ] Swelling [ ] Lymphadenopathy   Skin: [ ] Rash [ ] Ecchymoses [ ] Wounds [ ] Lesions  Psychiatry: [ ] Depression [ ] Suicidal/Homicidal Ideation [ ] Anxiety [ ] Sleep Disturbances  [ ] 10 point review of systems is otherwise negative except as mentioned above            [ ]Unable to obtain    Vitals:  T(C): 36.9 (07-09-19 @ 04:58), Max: 37.1 (07-08-19 @ 20:26)  HR: 56 (07-09-19 @ 05:13) (52 - 65)  BP: 113/66 (07-09-19 @ 04:58) (104/44 - 123/61)  BP(mean): --  RR: 16 (07-09-19 @ 04:58) (16 - 18)  SpO2: 96% (07-09-19 @ 05:13) (94% - 99%)  Wt(kg): --  Daily     Daily   I&O's Summary    08 Jul 2019 07:01  -  09 Jul 2019 07:00  --------------------------------------------------------  IN: 230 mL / OUT: 0 mL / NET: 230 mL        Physical Exam:  General: NAD  Eye: PERRL, EOMI  HENT: Normal oral mucosa NC/AT  CV: Normal S1/S2, RRR, No M/R/G, no edema, no elevation in JVP  Resp: Normal respiratory effort, clear to auscultation bilaterally, no wheezing, no crackles  Abd: Soft, Non-tender, Non-distended, BS+  Ext: No clubbing, No joint deformity   Neuro: Non-focal, motor and sensory intact  Lymph: No lymphadenopathy  Psych: AAOx3, Mood & affect appropriate  Skin: No rashes, No ecchymoses, No cyanosis    Labs:                        7.6    5.2   )-----------( 152      ( 09 Jul 2019 05:30 )             22.3     07-09    143  |  104  |  21  ----------------------------<  97  4.0   |  28  |  1.51<H>    Ca    8.7      09 Jul 2019 05:30  Phos  2.9     07-09  Mg     2.4     07-09                    New results/imaging:

## 2019-07-10 LAB
ANA TITR SER: NEGATIVE — SIGNIFICANT CHANGE UP
ANION GAP SERPL CALC-SCNC: 8 MMOL/L — SIGNIFICANT CHANGE UP (ref 5–17)
BUN SERPL-MCNC: 16 MG/DL — SIGNIFICANT CHANGE UP (ref 7–23)
CALCIUM SERPL-MCNC: 8.2 MG/DL — LOW (ref 8.4–10.5)
CHLORIDE SERPL-SCNC: 106 MMOL/L — SIGNIFICANT CHANGE UP (ref 96–108)
CO2 SERPL-SCNC: 27 MMOL/L — SIGNIFICANT CHANGE UP (ref 22–31)
CREAT SERPL-MCNC: 1.47 MG/DL — HIGH (ref 0.5–1.3)
GLUCOSE BLDC GLUCOMTR-MCNC: 115 MG/DL — HIGH (ref 70–99)
GLUCOSE BLDC GLUCOMTR-MCNC: 122 MG/DL — HIGH (ref 70–99)
GLUCOSE BLDC GLUCOMTR-MCNC: 125 MG/DL — HIGH (ref 70–99)
GLUCOSE BLDC GLUCOMTR-MCNC: 179 MG/DL — HIGH (ref 70–99)
GLUCOSE SERPL-MCNC: 98 MG/DL — SIGNIFICANT CHANGE UP (ref 70–99)
HCT VFR BLD CALC: 22 % — LOW (ref 39–50)
HCT VFR BLD CALC: 24.3 % — LOW (ref 39–50)
HCT VFR BLD CALC: 27.2 % — LOW (ref 39–50)
HGB BLD-MCNC: 7.5 G/DL — LOW (ref 13–17)
HGB BLD-MCNC: 8 G/DL — LOW (ref 13–17)
HGB BLD-MCNC: 8.1 G/DL — LOW (ref 13–17)
MCHC RBC-ENTMCNC: 27.7 PG — SIGNIFICANT CHANGE UP (ref 27–34)
MCHC RBC-ENTMCNC: 29.4 GM/DL — LOW (ref 32–36)
MCHC RBC-ENTMCNC: 29.6 PG — SIGNIFICANT CHANGE UP (ref 27–34)
MCHC RBC-ENTMCNC: 30.1 PG — SIGNIFICANT CHANGE UP (ref 27–34)
MCHC RBC-ENTMCNC: 33.4 GM/DL — SIGNIFICANT CHANGE UP (ref 32–36)
MCHC RBC-ENTMCNC: 34.1 GM/DL — SIGNIFICANT CHANGE UP (ref 32–36)
MCV RBC AUTO: 88.3 FL — SIGNIFICANT CHANGE UP (ref 80–100)
MCV RBC AUTO: 88.6 FL — SIGNIFICANT CHANGE UP (ref 80–100)
MCV RBC AUTO: 94.1 FL — SIGNIFICANT CHANGE UP (ref 80–100)
PLATELET # BLD AUTO: 152 K/UL — SIGNIFICANT CHANGE UP (ref 150–400)
PLATELET # BLD AUTO: 180 K/UL — SIGNIFICANT CHANGE UP (ref 150–400)
PLATELET # BLD AUTO: 186 K/UL — SIGNIFICANT CHANGE UP (ref 150–400)
POTASSIUM SERPL-MCNC: 3.7 MMOL/L — SIGNIFICANT CHANGE UP (ref 3.5–5.3)
POTASSIUM SERPL-SCNC: 3.7 MMOL/L — SIGNIFICANT CHANGE UP (ref 3.5–5.3)
RBC # BLD: 2.49 M/UL — LOW (ref 4.2–5.8)
RBC # BLD: 2.74 M/UL — LOW (ref 4.2–5.8)
RBC # BLD: 2.89 M/UL — LOW (ref 4.2–5.8)
RBC # FLD: 16.6 % — HIGH (ref 10.3–14.5)
RBC # FLD: 16.6 % — HIGH (ref 10.3–14.5)
RBC # FLD: 16.7 % — HIGH (ref 10.3–14.5)
SODIUM SERPL-SCNC: 141 MMOL/L — SIGNIFICANT CHANGE UP (ref 135–145)
WBC # BLD: 5.8 K/UL — SIGNIFICANT CHANGE UP (ref 3.8–10.5)
WBC # BLD: 6.01 K/UL — SIGNIFICANT CHANGE UP (ref 3.8–10.5)
WBC # BLD: 6.6 K/UL — SIGNIFICANT CHANGE UP (ref 3.8–10.5)
WBC # FLD AUTO: 5.8 K/UL — SIGNIFICANT CHANGE UP (ref 3.8–10.5)
WBC # FLD AUTO: 6.01 K/UL — SIGNIFICANT CHANGE UP (ref 3.8–10.5)
WBC # FLD AUTO: 6.6 K/UL — SIGNIFICANT CHANGE UP (ref 3.8–10.5)

## 2019-07-10 PROCEDURE — 74177 CT ABD & PELVIS W/CONTRAST: CPT | Mod: 26

## 2019-07-10 PROCEDURE — 99231 SBSQ HOSP IP/OBS SF/LOW 25: CPT

## 2019-07-10 PROCEDURE — 71260 CT THORAX DX C+: CPT | Mod: 26

## 2019-07-10 PROCEDURE — 99233 SBSQ HOSP IP/OBS HIGH 50: CPT

## 2019-07-10 PROCEDURE — 99232 SBSQ HOSP IP/OBS MODERATE 35: CPT | Mod: 57

## 2019-07-10 RX ADMIN — Medication 200 MILLIGRAM(S): at 18:51

## 2019-07-10 RX ADMIN — SODIUM CHLORIDE 3 MILLILITER(S): 9 INJECTION INTRAMUSCULAR; INTRAVENOUS; SUBCUTANEOUS at 20:12

## 2019-07-10 RX ADMIN — AMLODIPINE BESYLATE 10 MILLIGRAM(S): 2.5 TABLET ORAL at 05:35

## 2019-07-10 RX ADMIN — PANTOPRAZOLE SODIUM 10 MG/HR: 20 TABLET, DELAYED RELEASE ORAL at 01:06

## 2019-07-10 RX ADMIN — LOSARTAN POTASSIUM 50 MILLIGRAM(S): 100 TABLET, FILM COATED ORAL at 10:52

## 2019-07-10 RX ADMIN — SODIUM CHLORIDE 3 MILLILITER(S): 9 INJECTION INTRAMUSCULAR; INTRAVENOUS; SUBCUTANEOUS at 05:34

## 2019-07-10 RX ADMIN — SODIUM CHLORIDE 3 MILLILITER(S): 9 INJECTION INTRAMUSCULAR; INTRAVENOUS; SUBCUTANEOUS at 13:13

## 2019-07-10 RX ADMIN — ATORVASTATIN CALCIUM 20 MILLIGRAM(S): 80 TABLET, FILM COATED ORAL at 21:55

## 2019-07-10 NOTE — PROGRESS NOTE ADULT - SUBJECTIVE AND OBJECTIVE BOX
Patient is a 73y old  Male who presents with a chief complaint of symptomatic anemia (06 Jul 2019 11:55)    SUBJECTIVE / OVERNIGHT EVENTS:  Doing well this am without any new complaints. Denies any HA, CP, SOB. No fevers, chills or shakes. anticipated surgery on friday. Diet as tolerated.     REVIEW OF SYSTEMS:  General: NAD, hemodynamically stable   HEENT:  Eyes:  No visual loss  SKIN:  No rash or itching.  CARDIOVASCULAR:  No chest pain   RESPIRATORY:  No shortness of breath   GASTROINTESTINAL:  no dark stools  NEUROLOGICAL:  No headache, dizziness, syncope, paralysis, ataxia, numbness or tingling in the extremities. No change in bowel or bladder control.  MUSCULOSKELETAL:  No muscle, back pain, joint pain or stiffness.  HEMATOLOGIC:  + anemia  LYMPHATICS:  No enlarged nodes. No history of splenectomy.  ENDOCRINOLOGIC:  No reports of sweating, cold or heat intolerance. No polyuria or polydipsia.  ALLERGIES:  No history of asthma, hives, eczema or rhinitis.    PHYSICAL EXAM:  ICU Vital Signs Last 24 Hrs  T(C): 36.6 (10 Jul 2019 04:48), Max: 37.2 (09 Jul 2019 12:56)  T(F): 97.8 (10 Jul 2019 04:48), Max: 98.9 (09 Jul 2019 12:56)  HR: 60 (10 Jul 2019 10:36) (54 - 74)  BP: 116/71 (10 Jul 2019 04:48) (94/54 - 116/71)  RR: 16 (10 Jul 2019 04:48) (16 - 18)  SpO2: 98% (10 Jul 2019 10:36) (95% - 98%)    GENERAL:  Well appearing M, in NAD  HEAD:  NCAT  EYES: PERRLA  NECK: Supple, No JVD  CHEST/LUNG: CTA B/L. No w/r/r.  HEART: Reg rate. Normal S1, S2. No m/r/g.   ABDOMEN: SNT, obese  EXTREMITIES:  2+ Peripheral Pulses, No clubbing, cyanosis, edema.  PSYCH: appropriate affect    LABS:             CBC Full  -  ( 10 Jul 2019 05:05 )  WBC Count : 5.8 K/uL  RBC Count : 2.49 M/uL  Hemoglobin : 7.5 g/dL  Hematocrit : 22.0 %  Platelet Count - Automated : 152 K/uL    141  |  106  |  16  ----------------------------<  98  3.7   |  27  |  1.47<H>    Ca    8.2<L>      10 Jul 2019 05:05  Phos  2.9     07-09  Mg     2.4     07-09

## 2019-07-10 NOTE — PROGRESS NOTE ADULT - ASSESSMENT
7/5/19 the p over the last few weeks has been weak dizzy and easily fatigued. He went to see his pmd and the hemoglobin was noted to be in the 8 range where a few months earlier it was 12. He has been under the care of Dr Marc Mai and his hemoglobin was always about 12. He may have been experiencing dark stools over the last few weeks and has not been taking iron pills. The guaiac here was negative for blood. The pt was seen by pulmonary here for interstitial changes in the lungs and will have a ct scan to see if further studies are needed. He was a former . The GFR was 41 and bun was 26 and the white cell count was 6.6 hemoglobin 8.1 and MCV of 91 and MCHC of 29.8 and rdw of 16.5 . The ferritin was only 17 and this suggests iron deficiency anemia and he is to have a EGD and colonoscopy and I will give him iv iron to see if there is a response and he can follow with Dr Mai from heme onc.  7/8 pt receiving iv iron and gi eval in process, hemoglobin was 8.6.  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 The iron continues and will continue to check the cbc for now.  7/10 the pt was feeling well but the hemoglobin is down to 7.5 likely from bleeding. He will likely need transfusion before the surgery.

## 2019-07-10 NOTE — PROGRESS NOTE ADULT - SUBJECTIVE AND OBJECTIVE BOX
HPI: 73M with PMH HTN, HLD, DM2, TATYANA, CKD stage 3 who presented to the hospital for a cardiac cath. Pt reports that for 2 weeks he was experiencing dyspnea, dizziness, lightheadedness, and some mild discomfort in his shoulders and chest. His symptoms are worse on exertion and seem to improve with rest. He went to his cardiologist who performed a nuclear stress test (June 25th) that was normal. On his pre-op labs his Hb was 8.4. As an outpatient, the patient has his Hb checked every 4 months by his hematologist (Dr. Mai) for anemia of undetermined origin. His Hb is usually around 12 and was last checked in May 2019.     While working up the anemia, the patient had a CT scan of the chest/abdomen/pelvis that showed diverticulosis and some mild fibrosis of his b/l lower lobes. Pt was taken by GI for an upper endoscopy and colonoscopy today. According to his wife, colonoscopy was positive for diverticulosis (pending report in computer). Endoscopy showed a 2 cm (as reported by wife), submucosal mass with an ulcer in the center and a visible vessel with circumferential oozing. The vessel was clipped. Pt's last colonoscopy was normal approximately 8 years ago (Dr. Cyrus Moreland).    ROS: No headaches, fevers, chills, night sweats, weight loss, change in appetite, change in bowel movements, BRBPR, melena, nausea, vomiting. ROS otherwise negative. No history of abdominal surgery.     PMH: HTN, HLD DM2, CKD stage 3, mitral regurgitation, TATYANA  PSH: tonsillectomy (as a child), tooth extraction, removal of R foot lipoma (Dr. Jones (the wife thinks this was the surgeon) - orthopedics at [facility]) 8-10 years ago  Allergies: NKDA    MEDICATIONS  (STANDING):  amLODIPine   Tablet 10 milliGRAM(s) Oral daily  atorvastatin 20 milliGRAM(s) Oral at bedtime  insulin lispro (HumaLOG) corrective regimen sliding scale   SubCutaneous three times a day before meals  iron sucrose IVPB 200 milliGRAM(s) IV Intermittent every 24 hours  labetalol 200 milliGRAM(s) Oral two times a day  losartan 50 milliGRAM(s) Oral daily  pantoprazole Infusion 8 mG/Hr (10 mL/Hr) IV Continuous <Continuous>  sodium chloride 0.9% lock flush 3 milliLiter(s) IV Push every 8 hours    Social: Non smoker, 2-3 alcoholic drinks per month, no drug use.  and lives with wife.     Exam:  Vital Signs Last 24 Hrs  T(C): 36.3 (08 Jul 2019 18:29), Max: 36.7 (07 Jul 2019 21:22)  T(F): 97.4 (08 Jul 2019 18:29), Max: 98.1 (07 Jul 2019 21:22)  HR: 57 (08 Jul 2019 18:29) (56 - 63)  BP: 109/47 (08 Jul 2019 18:29) (105/60 - 123/61)  BP(mean): --  RR: 16 (08 Jul 2019 18:29) (16 - 18)  SpO2: 99% (08 Jul 2019 18:29) (94% - 99%)    General: NAD, resting comfortably  Pulm: breathing comfortably  CV: regular rate and rhythm  Abd: soft, nontender, nondistended  Extrem: FROM, ambulatory                        8.6    6.3   )-----------( 154      ( 08 Jul 2019 11:43 )             25.5       07-08    139  |  98  |  27<H>  ----------------------------<  96  3.5   |  29  |  1.48<H>    Ca    8.4      08 Jul 2019 05:36    CT chest/abdomen/pelvis: Reviewed. CT Chest: b/l lower lobe mild fibrosis. CT Abd/Pelvis: diverticulosis    Endoscopy: 2 cm submucosal mass with ulcerated center, visible vessel (s/p clip)  Colonoscopy: pending report    Stool guaiac negative

## 2019-07-10 NOTE — PROGRESS NOTE ADULT - SUBJECTIVE AND OBJECTIVE BOX
Patient is a 73y old  Male who presents with a chief complaint of symptomatic anemia (05 Jul 2019 08:54)      HPI:  74 yo  male (no implantable devices) PMH HTN, HLD, TATYANA, CKD III and DMT2 (a1c June 2019, managed by PCP, uncomplicated) presents for cardiac angiogram. Patient reports occasional episodes of moderate chest discomfort associated with dizziness, dyspnea and lightheadedness with moderate exertion x 2 weeks ago. Seen and evaluated by Dr. Mortensen (cards) recommended to have nuclear stress test which was completed on 6/25/19 revealing normal results, normal EF and wall motion, diaphragmatic attenuation. Symptoms still persist- recommends to have LHC for further ischemic evaluation. (02 Jul 2019 09:24)    7/5/19 the p over the last few weeks has been weak dizzy and easily fatigued. He went to see his pmd and the hemoglobin was noted to be in the 8 range where a few months earlier it was 12. He has been under the care of Dr Marc Mai and his hemoglobin was always about 12. He may have been experiencing dark stools over the last few weeks and has not been taking iron pills. The guaiac here was negative for blood. The pt was seen by pulmonary here for interstitial changes in the lungs and will have a ct scan to see if further studies are needed. He was a former . The GFR was 41 and bun was 26 and the white cell count was 6.6 hemoglobin 8.1 and MCV of 91 and MCHC of 29.8 and rdw of 16.5 . The ferritin was only 17 and this suggests iron deficiency anemia and he is to have a EGD and colonoscopy and I will give him iv iron to see if there is a response and he can follow with Dr Mai from heme onc.  7/8 pt receiving iv iron and gi eval in process, hemoglobin was 8.6. 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 The iron continues and will continue to check the cbc for now. 7/10 the pt was feeling well but the hemoglobin is down to 7.5 likely from bleeding. He will likely need transfusion before the surgery.   ICU Vital Signs Last 24 Hrs  T(C): 36.6 (10 Jul 2019 13:33), Max: 36.7 (09 Jul 2019 20:41)  T(F): 97.9 (10 Jul 2019 13:33), Max: 98.1 (09 Jul 2019 20:41)  HR: 59 (10 Jul 2019 13:33) (54 - 71)  BP: 110/57 (10 Jul 2019 13:33) (110/57 - 116/71)  BP(mean): --  ABP: --  ABP(mean): --  RR: 18 (10 Jul 2019 13:33) (16 - 18)  SpO2: 97% (10 Jul 2019 13:33) (95% - 98%)  LABS:                         7.5    5.8   )-----------( 152      ( 10 Jul 2019 05:05 )             22.0     07-05    141  |  100  |  26<H>  ----------------------------<  116<H>  3.9   |  26  |  1.65<H>    Ca    9.1      05 Jul 2019 06:33  Phos  3.2     07-04  Mg     2.4     07-04      PT/INR - ( 04 Jul 2019 08:53 )   PT: 13.5 sec;   INR: 1.18 ratio         PTT - ( 04 Jul 2019 08:53 )  PTT:34.7 sec      ROS:  Negative except for:    PAST MEDICAL & SURGICAL HISTORY:  TATYANA (obstructive sleep apnea)  Mitral regurgitation  Diabetes  HLD (hyperlipidemia)  HTN (hypertension)  Abdominal hernia  Uncontrolled hypertension: BP ranged 170-250/--asymptomatic. Sent from PAST office on 8/12/13 to [facility] ER via ambulance  Snoring: TATYANA precautions--responds affirmatively to STOP BANG questionnaire--admits to loud snoring and daytime somnolence; witnessed apneic events by wife; age &gt; 50; gender, male  Mass: right foot soft tissue mass in August 2013  Tooth decay: surgery for tooth extraction  S/P tonsillectomy      SOCIAL HISTORY:    FAMILY HISTORY:      Allergies    No Known Allergies    Intolerances        PHYSICAL EXAM being seen by the staff and I will return  General: adult in NAD  HEENT: clear oropharynx, anicteric sclera, pink conjunctivae  Neck: supple  CV: normal S1S2 with no murmur rubs or gallops  Lungs: clear to auscultation, no wheezes, no rales  Abdomen: soft non-tender non-distended, no hepatosplenomegaly  Ext: no clubbing cyanosis or edema  Skin: no rashes and no petechiae  Neuro: alert and oriented X3 no focal deficits    BLOOD SMEAR INTERPRETATION:    RADIOLOGY :

## 2019-07-10 NOTE — PROGRESS NOTE ADULT - ASSESSMENT
72 y/o m pmhx HTN, HLD, TATYANA, CKD III and DMII sent in for angiogram given LAUREN/CP found to have symptomatic anemia, now for EGD/cscope 7/8.     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 Ulcerated and excavated with visible vessel s/p clip placement  - s/p endoscopy / colonoscopy 7 / 8   - started on IV Venofer  - HH 7.6 today  - CT A/P with diverticulosis   - started on IV Protonix gtt  - GI following  - Surgery following  - Heme / onc following  - patient will need CT abd /  pelvis for further staging purposes  - plan for OR with Dr. Stewart Friday July 12 for partial gastrectomy     # Dyspnea on exertion  - Suspect secondary to anemia. Less likely cardiac in nature given negative nuclear stress test. EKG nonischemic. PNA unlikely given lack of cough, fever, and PE signs. VTE also unlikely given lack of LE edema, tachycardia, and risk factors.  - CT: interstitial lung disease of undetermined etiology. 1.3 cm short axis subcarinal lymph node (series 3 image 84). Recommend follow-up chest CT in 3 months to determine stability.  - On RA, in no acute distress at this time  - pulm clearance for EGD/colonoscopy appreciated    # Dizziness  - Suspect secondary to anemia. Less likely cardiac in nature given negative nuclear stress test. Orthostatics negative. Exam nonfocal making stroke less likely.   - Anemia workup as above.  - Once workup complete, revisit need for ischemic workup with cardiology.     # Abnormal chest XRAY  - CT: interstitial lung disease of undetermined etiology. 1.3 cm short axis subcarinal lymph node (series 3 image 84).   Recommend follow-up chest CT in 3 months to determine stability.  - pulmonary evaluation appreciated. will follow outpatient.    # CKD (chronic kidney disease) stage 3, GFR 30-59 ml/min  - BUN/Cr ratio suggestive of chronic process. Unknown baseline.   - Scr 1.62-> 1.52-->1.65 --> 1.72 --> 1.7 -> 1.48  - hold hydralazine in setting of worsening Cr    # HTN (hypertension)  -  Continue home medications - labetalol, amlodipine    # Type 2 diabetes mellitus  - LSSI  - consistent carb / dash diet

## 2019-07-10 NOTE — PROGRESS NOTE ADULT - SUBJECTIVE AND OBJECTIVE BOX
Chief Complaint:  Patient is a 73y old  Male who presents with a chief complaint of anemia (2019 19:49)      Interval Events: No events overnight.  Undergoing medical optimization for planned surgery on Friday.    Allergies:  No Known Allergies      Hospital Medications:  amLODIPine   Tablet 10 milliGRAM(s) Oral daily  atorvastatin 20 milliGRAM(s) Oral at bedtime  insulin lispro (HumaLOG) corrective regimen sliding scale   SubCutaneous three times a day before meals  iron sucrose IVPB 200 milliGRAM(s) IV Intermittent every 24 hours  labetalol 200 milliGRAM(s) Oral two times a day  losartan 50 milliGRAM(s) Oral daily  pantoprazole Infusion 8 mG/Hr IV Continuous <Continuous>  sodium chloride 0.9% lock flush 3 milliLiter(s) IV Push every 8 hours  sodium chloride 0.9%. 1000 milliLiter(s) IV Continuous <Continuous>      PMHX/PSHX:  TATYANA (obstructive sleep apnea)  Mitral regurgitation  Diabetes  HLD (hyperlipidemia)  HTN (hypertension)  HTN (hypertension)  Abdominal hernia  Uncontrolled hypertension  Snoring  Mass  Tooth decay  S/P tonsillectomy      ROS:   General:  No wt loss, fevers, chills, night sweats, fatigue,   Eyes:  Good vision, no reported pain  ENT:  No sore throat, pain, runny nose, dysphagia  CV:  No pain, palpitations, hypo/hypertension  Resp:  No dyspnea, cough, tachypnea, wheezing  GI:  See HPI  :  No pain, bleeding, incontinence, nocturia  Muscle:  No pain, weakness  Neuro:  No weakness, tingling, memory problems  Psych:  No fatigue, insomnia, mood problems, depression  Endocrine:  No polyuria, polydipsia, cold/heat intolerance  Heme:  No petechiae, ecchymosis, easy bruisability  Skin:  No rash, edema      PHYSICAL EXAM:     GENERAL: NAD  HEENT:  NC/AT  CHEST:  Full & symmetric excursion, no increased effort  ABDOMEN:  Soft, non-tender, non-distended, +BS  EXTREMITIES:  no edema  SKIN:  No rash  NEURO:  Alert      Vital Signs:  Vital Signs Last 24 Hrs  T(C): 36.6 (10 Jul 2019 04:48), Max: 37.2 (2019 12:56)  T(F): 97.8 (10 Jul 2019 04:48), Max: 98.9 (2019 12:56)  HR: 55 (10 Jul 2019 05:09) (54 - 74)  BP: 116/71 (10 Jul 2019 04:48) (94/54 - 119/57)  BP(mean): --  RR: 16 (10 Jul 2019 04:48) (16 - 18)  SpO2: 97% (10 Jul 2019 05:09) (95% - 97%)  Daily     Daily Weight in k.5 (2019 16:07)    LABS:                        7.5    5.8   )-----------( 152      ( 10 Jul 2019 05:05 )             22.0     07-10    141  |  106  |  16  ----------------------------<  98  3.7   |  27  |  1.47<H>    Ca    8.2<L>      10 Jul 2019 05:05  Phos  2.9     07-09  Mg     2.4     07-09        Imaging:  reviewed

## 2019-07-10 NOTE — PROGRESS NOTE ADULT - ASSESSMENT
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  ~3cm on proximal lesser curve of stomach  2) Iron deficiency anemia - likely secondary to oozing GIST  3) Hypertension  4) TATYANA  5) T2DM  6) CKDIII    Recs:  - plan for surgery on Friday   - trend CBC, transfuse if Hgb <7  - diet as tolerated  - nausea and pain management PRN  - lozenge for sore throat

## 2019-07-11 ENCOUNTER — TRANSCRIPTION ENCOUNTER (OUTPATIENT)
Age: 74
End: 2019-07-11

## 2019-07-11 PROBLEM — E11.9 TYPE 2 DIABETES MELLITUS WITHOUT COMPLICATIONS: Chronic | Status: ACTIVE | Noted: 2019-07-02

## 2019-07-11 PROBLEM — I34.0 NONRHEUMATIC MITRAL (VALVE) INSUFFICIENCY: Chronic | Status: ACTIVE | Noted: 2019-07-02

## 2019-07-11 PROBLEM — G47.33 OBSTRUCTIVE SLEEP APNEA (ADULT) (PEDIATRIC): Chronic | Status: ACTIVE | Noted: 2019-07-02

## 2019-07-11 PROBLEM — E78.5 HYPERLIPIDEMIA, UNSPECIFIED: Chronic | Status: ACTIVE | Noted: 2019-07-02

## 2019-07-11 LAB
ALBUMIN SERPL ELPH-MCNC: 3.8 G/DL — SIGNIFICANT CHANGE UP (ref 3.3–5)
ALP SERPL-CCNC: 63 U/L — SIGNIFICANT CHANGE UP (ref 40–120)
ALT FLD-CCNC: 14 U/L — SIGNIFICANT CHANGE UP (ref 10–45)
ANION GAP SERPL CALC-SCNC: 12 MMOL/L — SIGNIFICANT CHANGE UP (ref 5–17)
APTT BLD: 33 SEC — SIGNIFICANT CHANGE UP (ref 27.5–36.3)
AST SERPL-CCNC: 17 U/L — SIGNIFICANT CHANGE UP (ref 10–40)
BILIRUB SERPL-MCNC: 0.3 MG/DL — SIGNIFICANT CHANGE UP (ref 0.2–1.2)
BLD GP AB SCN SERPL QL: NEGATIVE — SIGNIFICANT CHANGE UP
BUN SERPL-MCNC: 16 MG/DL — SIGNIFICANT CHANGE UP (ref 7–23)
CALCIUM SERPL-MCNC: 8.8 MG/DL — SIGNIFICANT CHANGE UP (ref 8.4–10.5)
CHLORIDE SERPL-SCNC: 104 MMOL/L — SIGNIFICANT CHANGE UP (ref 96–108)
CO2 SERPL-SCNC: 26 MMOL/L — SIGNIFICANT CHANGE UP (ref 22–31)
CREAT SERPL-MCNC: 1.56 MG/DL — HIGH (ref 0.5–1.3)
GLUCOSE BLDC GLUCOMTR-MCNC: 111 MG/DL — HIGH (ref 70–99)
GLUCOSE BLDC GLUCOMTR-MCNC: 126 MG/DL — HIGH (ref 70–99)
GLUCOSE BLDC GLUCOMTR-MCNC: 130 MG/DL — HIGH (ref 70–99)
GLUCOSE BLDC GLUCOMTR-MCNC: 186 MG/DL — HIGH (ref 70–99)
GLUCOSE BLDC GLUCOMTR-MCNC: 189 MG/DL — HIGH (ref 70–99)
GLUCOSE SERPL-MCNC: 120 MG/DL — HIGH (ref 70–99)
HCT VFR BLD CALC: 22.1 % — LOW (ref 39–50)
HCT VFR BLD CALC: 23.6 % — LOW (ref 39–50)
HCT VFR BLD CALC: 24.1 % — LOW (ref 39–50)
HCT VFR BLD CALC: 24.6 % — LOW (ref 39–50)
HGB BLD-MCNC: 7 G/DL — CRITICAL LOW (ref 13–17)
HGB BLD-MCNC: 7.2 G/DL — LOW (ref 13–17)
HGB BLD-MCNC: 8 G/DL — LOW (ref 13–17)
HGB BLD-MCNC: 8.3 G/DL — LOW (ref 13–17)
INR BLD: 1.23 RATIO — HIGH (ref 0.88–1.16)
MAGNESIUM SERPL-MCNC: 2.1 MG/DL — SIGNIFICANT CHANGE UP (ref 1.6–2.6)
MCHC RBC-ENTMCNC: 27.2 PG — SIGNIFICANT CHANGE UP (ref 27–34)
MCHC RBC-ENTMCNC: 28.6 PG — SIGNIFICANT CHANGE UP (ref 27–34)
MCHC RBC-ENTMCNC: 29 GM/DL — LOW (ref 32–36)
MCHC RBC-ENTMCNC: 29.8 PG — SIGNIFICANT CHANGE UP (ref 27–34)
MCHC RBC-ENTMCNC: 30 PG — SIGNIFICANT CHANGE UP (ref 27–34)
MCHC RBC-ENTMCNC: 32.6 GM/DL — SIGNIFICANT CHANGE UP (ref 32–36)
MCHC RBC-ENTMCNC: 33.8 GM/DL — SIGNIFICANT CHANGE UP (ref 32–36)
MCHC RBC-ENTMCNC: 33.9 GM/DL — SIGNIFICANT CHANGE UP (ref 32–36)
MCV RBC AUTO: 87.8 FL — SIGNIFICANT CHANGE UP (ref 80–100)
MCV RBC AUTO: 88.1 FL — SIGNIFICANT CHANGE UP (ref 80–100)
MCV RBC AUTO: 88.5 FL — SIGNIFICANT CHANGE UP (ref 80–100)
MCV RBC AUTO: 93.8 FL — SIGNIFICANT CHANGE UP (ref 80–100)
PHOSPHATE SERPL-MCNC: 3.1 MG/DL — SIGNIFICANT CHANGE UP (ref 2.5–4.5)
PLATELET # BLD AUTO: 153 K/UL — SIGNIFICANT CHANGE UP (ref 150–400)
PLATELET # BLD AUTO: 155 K/UL — SIGNIFICANT CHANGE UP (ref 150–400)
PLATELET # BLD AUTO: 157 K/UL — SIGNIFICANT CHANGE UP (ref 150–400)
PLATELET # BLD AUTO: 163 K/UL — SIGNIFICANT CHANGE UP (ref 150–400)
POTASSIUM SERPL-MCNC: 4.1 MMOL/L — SIGNIFICANT CHANGE UP (ref 3.5–5.3)
POTASSIUM SERPL-SCNC: 4.1 MMOL/L — SIGNIFICANT CHANGE UP (ref 3.5–5.3)
PROT SERPL-MCNC: 6.2 G/DL — SIGNIFICANT CHANGE UP (ref 6–8.3)
PROTHROM AB SERPL-ACNC: 14 SEC — HIGH (ref 10–13.1)
RBC # BLD: 2.52 M/UL — LOW (ref 4.2–5.8)
RBC # BLD: 2.57 M/UL — LOW (ref 4.2–5.8)
RBC # BLD: 2.66 M/UL — LOW (ref 4.2–5.8)
RBC # BLD: 2.79 M/UL — LOW (ref 4.2–5.8)
RBC # FLD: 16.5 % — HIGH (ref 10.3–14.5)
RBC # FLD: 16.6 % — HIGH (ref 10.3–14.5)
RBC # FLD: 16.6 % — HIGH (ref 10.3–14.5)
RBC # FLD: 16.7 % — HIGH (ref 10.3–14.5)
RH IG SCN BLD-IMP: POSITIVE — SIGNIFICANT CHANGE UP
SODIUM SERPL-SCNC: 142 MMOL/L — SIGNIFICANT CHANGE UP (ref 135–145)
WBC # BLD: 5.77 K/UL — SIGNIFICANT CHANGE UP (ref 3.8–10.5)
WBC # BLD: 6 K/UL — SIGNIFICANT CHANGE UP (ref 3.8–10.5)
WBC # BLD: 6 K/UL — SIGNIFICANT CHANGE UP (ref 3.8–10.5)
WBC # BLD: 6.1 K/UL — SIGNIFICANT CHANGE UP (ref 3.8–10.5)
WBC # FLD AUTO: 5.77 K/UL — SIGNIFICANT CHANGE UP (ref 3.8–10.5)
WBC # FLD AUTO: 6 K/UL — SIGNIFICANT CHANGE UP (ref 3.8–10.5)
WBC # FLD AUTO: 6 K/UL — SIGNIFICANT CHANGE UP (ref 3.8–10.5)
WBC # FLD AUTO: 6.1 K/UL — SIGNIFICANT CHANGE UP (ref 3.8–10.5)

## 2019-07-11 PROCEDURE — 99233 SBSQ HOSP IP/OBS HIGH 50: CPT

## 2019-07-11 PROCEDURE — 99231 SBSQ HOSP IP/OBS SF/LOW 25: CPT

## 2019-07-11 RX ORDER — DEXTROSE MONOHYDRATE, SODIUM CHLORIDE, AND POTASSIUM CHLORIDE 50; .745; 4.5 G/1000ML; G/1000ML; G/1000ML
1000 INJECTION, SOLUTION INTRAVENOUS
Refills: 0 | Status: DISCONTINUED | OUTPATIENT
Start: 2019-07-11 | End: 2019-07-12

## 2019-07-11 RX ADMIN — Medication 1: at 12:56

## 2019-07-11 RX ADMIN — PANTOPRAZOLE SODIUM 10 MG/HR: 20 TABLET, DELAYED RELEASE ORAL at 03:22

## 2019-07-11 RX ADMIN — SODIUM CHLORIDE 3 MILLILITER(S): 9 INJECTION INTRAMUSCULAR; INTRAVENOUS; SUBCUTANEOUS at 12:58

## 2019-07-11 RX ADMIN — SODIUM CHLORIDE 3 MILLILITER(S): 9 INJECTION INTRAMUSCULAR; INTRAVENOUS; SUBCUTANEOUS at 05:35

## 2019-07-11 RX ADMIN — AMLODIPINE BESYLATE 10 MILLIGRAM(S): 2.5 TABLET ORAL at 05:36

## 2019-07-11 RX ADMIN — Medication 200 MILLIGRAM(S): at 18:13

## 2019-07-11 RX ADMIN — PANTOPRAZOLE SODIUM 10 MG/HR: 20 TABLET, DELAYED RELEASE ORAL at 22:12

## 2019-07-11 RX ADMIN — LOSARTAN POTASSIUM 50 MILLIGRAM(S): 100 TABLET, FILM COATED ORAL at 10:52

## 2019-07-11 RX ADMIN — SODIUM CHLORIDE 3 MILLILITER(S): 9 INJECTION INTRAMUSCULAR; INTRAVENOUS; SUBCUTANEOUS at 21:46

## 2019-07-11 RX ADMIN — ATORVASTATIN CALCIUM 20 MILLIGRAM(S): 80 TABLET, FILM COATED ORAL at 22:12

## 2019-07-11 RX ADMIN — SODIUM CHLORIDE 50 MILLILITER(S): 9 INJECTION INTRAMUSCULAR; INTRAVENOUS; SUBCUTANEOUS at 03:21

## 2019-07-11 NOTE — PROGRESS NOTE ADULT - SUBJECTIVE AND OBJECTIVE BOX
Patient is a 73y old  Male who presents with a chief complaint of symptomatic anemia (05 Jul 2019 08:54)      HPI:  72 yo  male (no implantable devices) PMH HTN, HLD, TATYANA, CKD III and DMT2 (a1c June 2019, managed by PCP, uncomplicated) presents for cardiac angiogram. Patient reports occasional episodes of moderate chest discomfort associated with dizziness, dyspnea and lightheadedness with moderate exertion x 2 weeks ago. Seen and evaluated by Dr. Mortensen (cards) recommended to have nuclear stress test which was completed on 6/25/19 revealing normal results, normal EF and wall motion, diaphragmatic attenuation. Symptoms still persist- recommends to have LHC for further ischemic evaluation. (02 Jul 2019 09:24)    7/5/19 the p over the last few weeks has been weak dizzy and easily fatigued. He went to see his pmd and the hemoglobin was noted to be in the 8 range where a few months earlier it was 12. He has been under the care of Dr Marc Mai and his hemoglobin was always about 12. He may have been experiencing dark stools over the last few weeks and has not been taking iron pills. The guaiac here was negative for blood. The pt was seen by pulmonary here for interstitial changes in the lungs and will have a ct scan to see if further studies are needed. He was a former . The GFR was 41 and bun was 26 and the white cell count was 6.6 hemoglobin 8.1 and MCV of 91 and MCHC of 29.8 and rdw of 16.5 . The ferritin was only 17 and this suggests iron deficiency anemia and he is to have a EGD and colonoscopy and I will give him iv iron to see if there is a response and he can follow with Dr Mai from heme onc.  7/8 pt receiving iv iron and gi eval in process, hemoglobin was 8.6. 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 The iron continues and will continue to check the cbc for now. 7/10 the pt was feeling well but the hemoglobin is down to 7.5 likely from bleeding. He will likely need transfusion before the surgery. 7/11 surgery scheduled on Friday and case again gone over with [facility] pt and the family at the bedside The hemoglobin was up to 8.3 and no recent transfusion was given.  ICU Vital Signs Last 24 Hrs  T(C): 36.8 (11 Jul 2019 12:45), Max: 37 (11 Jul 2019 04:11)  T(F): 98.2 (11 Jul 2019 12:45), Max: 98.6 (11 Jul 2019 04:11)  HR: 61 (11 Jul 2019 12:45) (55 - 86)  BP: 114/56 (11 Jul 2019 12:45) (109/61 - 131/647)  BP(mean): --  ABP: --  ABP(mean): --  RR: 17 (11 Jul 2019 12:45) (17 - 18)  SpO2: 99% (11 Jul 2019 12:45) (95% - 99%)                        8.3    6.0   )-----------( 157      ( 11 Jul 2019 10:50 )             24.6     07-05    141  |  100  |  26<H>  ----------------------------<  116<H>  3.9   |  26  |  1.65<H>    Ca    9.1      05 Jul 2019 06:33  Phos  3.2     07-04  Mg     2.4     07-04      PT/INR - ( 04 Jul 2019 08:53 )   PT: 13.5 sec;   INR: 1.18 ratio         PTT - ( 04 Jul 2019 08:53 )  PTT:34.7 sec      ROS:  Negative except for:    PAST MEDICAL & SURGICAL HISTORY:  TATYANA (obstructive sleep apnea)  Mitral regurgitation  Diabetes  HLD (hyperlipidemia)  HTN (hypertension)  Abdominal hernia  Uncontrolled hypertension: BP ranged 170-250/--asymptomatic. Sent from PAST office on 8/12/13 to [facility] ER via ambulance  Snoring: TATYANA precautions--responds affirmatively to STOP BANG questionnaire--admits to loud snoring and daytime somnolence; witnessed apneic events by wife; age &gt; 50; gender, male  Mass: right foot soft tissue mass in August 2013  Tooth decay: surgery for tooth extraction  S/P tonsillectomy      SOCIAL HISTORY:    FAMILY HISTORY:      Allergies    No Known Allergies    Intolerances        PHYSICAL EXAM being seen by the staff and I will return  General: adult in NAD  HEENT: clear oropharynx, anicteric sclera, pink conjunctivae  Neck: supple  CV: normal S1S2 with no murmur rubs or gallops  Lungs: clear to auscultation, no wheezes, no rales  Abdomen: soft non-tender non-distended, no hepatosplenomegaly  Ext: no clubbing cyanosis or edema  Skin: no rashes and no petechiae  Neuro: alert and oriented X3 no focal deficits    BLOOD SMEAR INTERPRETATION:    RADIOLOGY :

## 2019-07-11 NOTE — DIETITIAN INITIAL EVALUATION ADULT. - REASON INDICATOR FOR ASSESSMENT
Initial nutrition assessment for length of stay Initial nutrition assessment for length of stay. Pt is a 73 year old male admitted for chest discomfort with dizziness, dyspnea, lightheadedness. During hospital stay, Pt with noted anemia due to a downtrending in hemoglobin. S/P endoscopy and colonoscopy on 7/8/19. Results indicate Pt with ulcerated submucosal gastric lesion, likely GIST. Found with upper GI bleed and diverticulosis. Plan for laparoscopic wedge resection on GIST on 7/12. Pt to get blood transfusion prior to surgery.

## 2019-07-11 NOTE — DIETITIAN INITIAL EVALUATION ADULT. - ADD RECOMMEND
1. Continue with current diet order of consistent carbohydrates with snack, dash/tlc. 2. Remain available for any further questions regarding nutrition education. 3. Monitor Pt's weights, skin integrity, edema, GI distress, tolerance to diet prescription. 4. RD to remain available.

## 2019-07-11 NOTE — CHART NOTE - NSCHARTNOTEFT_GEN_A_CORE
Preop Dx: Submucosal Mass  Surgeon: Terry  Procedure: laparoscopic possible open gastrectomy, endoscopy    Vital Signs Last 24 Hrs  T(C): 37 (2019 04:11), Max: 37 (2019 04:11)  T(F): 98.6 (2019 04:11), Max: 98.6 (2019 04:11)  HR: 61 (2019 10:51) (55 - 86)  BP: 110/61 (2019 10:51) (109/61 - 131/647)  BP(mean): --  RR: 17 (2019 04:11) (17 - 18)  SpO2: 98% (2019 10:16) (95% - 98%)                        7.0    5.77  )-----------( 153      ( 2019 08:36 )             24.1     07-11    142  |  104  |  16  ----------------------------<  120<H>  4.1   |  26  |  1.56<H>    Ca    8.8      2019 05:52  Phos  3.1     07-11  Mg     2.1     07-11    TPro  6.2  /  Alb  3.8  /  TBili  0.3  /  DBili  x   /  AST  17  /  ALT  14  /  AlkPhos  63  07-11    PT/INR - ( 2019 08:24 )   PT: 14.0 sec;   INR: 1.23 ratio         PTT - ( 2019 08:24 )  PTT:33.0 sec      EK/3/2019 Sinus Van HR 52  Type and Screen: available 19        A/P: 73y Male PMH HTN, HLD, TATYANA, CKD III and DMII initially presenting with asymptomatic anemia found to have bleeding ulceration of lesser curvature of the stomach by EGD concerning for GIST, plan for surgical removal and pathology tomorrow.     - most recent Hgb 7.0 - if 7.0 or less on repeat please transfuse 1U PRBC.   - OR 2019 for laparoscopic possible open gastrectomy and endoscopy with Dr. Stewart  - NPO past midnight, except medications  - IVF while NPO  - Consent signed and in chart  - Medical clearance for OR by cardiology and renal completed.    [facility]  Page [phone] Preop Dx: Submucosal Mass  Surgeon: Terry  Procedure: laparoscopic possible open gastrectomy, endoscopy    Vital Signs Last 24 Hrs  T(C): 37 (2019 04:11), Max: 37 (2019 04:11)  T(F): 98.6 (2019 04:11), Max: 98.6 (2019 04:11)  HR: 61 (2019 10:51) (55 - 86)  BP: 110/61 (2019 10:51) (109/61 - 131/647)  BP(mean): --  RR: 17 (2019 04:11) (17 - 18)  SpO2: 98% (2019 10:16) (95% - 98%)                        7.0    5.77  )-----------( 153      ( 2019 08:36 )             24.1     07-11    142  |  104  |  16  ----------------------------<  120<H>  4.1   |  26  |  1.56<H>    Ca    8.8      2019 05:52  Phos  3.1     07-11  Mg     2.1     07-11    TPro  6.2  /  Alb  3.8  /  TBili  0.3  /  DBili  x   /  AST  17  /  ALT  14  /  AlkPhos  63  07-11    PT/INR - ( 2019 08:24 )   PT: 14.0 sec;   INR: 1.23 ratio         PTT - ( 2019 08:24 )  PTT:33.0 sec      EK/3/2019 Sinus Van HR 52  Type and Screen: available 19  CXR: 7/3/2019  bibasilar reticular opacities suspicious for interstitial lung disease, f/u CT available        A/P: 73y Male PMH HTN, HLD, TATYANA, CKD III and DMII initially presenting with asymptomatic anemia found to have bleeding ulceration of lesser curvature of the stomach by EGD concerning for GIST, plan for surgical removal and pathology tomorrow.     - most recent Hgb 7.0 - if 7.0 or less on repeat please transfuse 1U PRBC.   - OR 2019 for laparoscopic possible open gastrectomy and endoscopy with Dr. Stewart  - NPO past midnight, except medications  - IVF while NPO  - Consent signed and in chart  - Medical clearance for OR by cardiology and renal completed.    [facility]  Page [phone]

## 2019-07-11 NOTE — DIETITIAN INITIAL EVALUATION ADULT. - NUTRITIONGOAL OUTCOME1
Pt to lose weight gradually towards IBW Short term: Pt to meet, not exceed, estimated needs. Long term: Lose weight gradually towards IBW.

## 2019-07-11 NOTE — PROGRESS NOTE ADULT - ASSESSMENT
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  ~3cm on proximal lesser curve of stomach  2) Iron deficiency anemia - likely secondary to oozing GIST  3) Hypertension  4) TATYANA  5) T2DM  6) CKDIII    Recs:  - plan for surgery on Friday   - followup final read of CT today - lymph nodes present?   - trend CBC, transfuse if Hgb <7  - diet as tolerated, NPO after midnight   - nausea and pain management PRN

## 2019-07-11 NOTE — PROGRESS NOTE ADULT - SUBJECTIVE AND OBJECTIVE BOX
Chief Complaint:  Patient is a 73y old  Male who presents with a chief complaint of symptomatic anemia (10 Jul 2019 14:34)      Interval Events: Patient denies complaints this morning.  CT C/A/P completed , final read pending.    Allergies:  No Known Allergies      Hospital Medications:  amLODIPine   Tablet 10 milliGRAM(s) Oral daily  atorvastatin 20 milliGRAM(s) Oral at bedtime  insulin lispro (HumaLOG) corrective regimen sliding scale   SubCutaneous three times a day before meals  labetalol 200 milliGRAM(s) Oral two times a day  losartan 50 milliGRAM(s) Oral daily  pantoprazole Infusion 8 mG/Hr IV Continuous <Continuous>  sodium chloride 0.9% lock flush 3 milliLiter(s) IV Push every 8 hours  sodium chloride 0.9%. 1000 milliLiter(s) IV Continuous <Continuous>      PMHX/PSHX:  TATYANA (obstructive sleep apnea)  Mitral regurgitation  Diabetes  HLD (hyperlipidemia)  HTN (hypertension)  HTN (hypertension)  Abdominal hernia  Uncontrolled hypertension  Snoring  Mass  Tooth decay  S/P tonsillectomy      ROS:     General:  No wt loss, fevers, chills, night sweats, fatigue,   Eyes:  Good vision, no reported pain  ENT:  No sore throat, pain, runny nose, dysphagia  CV:  No pain, palpitations, hypo/hypertension  Resp:  No dyspnea, cough, tachypnea, wheezing  GI:  See HPI  :  No pain, bleeding, incontinence, nocturia  Muscle:  No pain, weakness  Neuro:  No weakness, tingling, memory problems  Psych:  No fatigue, insomnia, mood problems, depression  Endocrine:  No polyuria, polydipsia, cold/heat intolerance  Heme:  No petechiae, ecchymosis, easy bruisability  Skin:  No rash, edema      PHYSICAL EXAM:     GENERAL: NAD  HEENT:  NC/AT  CHEST:  Full & symmetric excursion, no increased effort  ABDOMEN:  Soft, non-tender, non-distended, +BS  EXTREMITIES:  no edema  SKIN:  No rash  NEURO:  Alert      Vital Signs:  Vital Signs Last 24 Hrs  T(C): 37 (11 Jul 2019 04:11), Max: 37 (11 Jul 2019 04:11)  T(F): 98.6 (11 Jul 2019 04:11), Max: 98.6 (11 Jul 2019 04:11)  HR: 85 (11 Jul 2019 07:37) (55 - 86)  BP: 109/61 (11 Jul 2019 04:11) (109/61 - 131/647)  BP(mean): --  RR: 17 (11 Jul 2019 04:11) (17 - 18)  SpO2: 95% (11 Jul 2019 07:37) (95% - 98%)  Daily     Daily     LABS:                        7.2    6.0   )-----------( 155      ( 10 Jul 2019 23:44 )             22.1     07-11    142  |  104  |  16  ----------------------------<  120<H>  4.1   |  26  |  1.56<H>    Ca    8.8      11 Jul 2019 05:52  Phos  3.1     07-11  Mg     2.1     07-11    TPro  6.2  /  Alb  3.8  /  TBili  0.3  /  DBili  x   /  AST  17  /  ALT  14  /  AlkPhos  63  07-11    LIVER FUNCTIONS - ( 11 Jul 2019 05:52 )  Alb: 3.8 g/dL / Pro: 6.2 g/dL / ALK PHOS: 63 U/L / ALT: 14 U/L / AST: 17 U/L / GGT: x                   Imaging:    < from: CT Abdomen and Pelvis w/ Oral Cont and w/ IV Cont (07.10.19 @ 17:56) >    ******PRELIMINARY REPORT******    ******PRELIMINARY REPORT******          EXAM:  CT ABDOMEN AND PELVIS OC IC                            PROCEDURE DATE:  07/10/2019      ******PRELIMINARY REPORT******    ******PRELIMINARY REPORT******              INTERPRETATION:  Numerous peripherally oriented calcific granulomata with   by basilar predominant reticular opacities and minimal traction   bronchiectasis, unchanged from prior CT Chest 7/3/19.  There is a 5.0 x 3.2 cm gastric mass at the lesser curvature.  No significant lymphadenopathy is visualized.  Follow-up official report in a.m.              ******PRELIMINARY REPORT******    ******PRELIMINARY REPORT******          NAVNEET ROSARIO M.D., RADIOLOGY RESIDENT                      < end of copied text >

## 2019-07-11 NOTE — DIETITIAN INITIAL EVALUATION ADULT. - ENERGY NEEDS
Weight: 264.5 pounds, Height: 74 inches, BMI: 34kg/m2, IBW: 190 pounds +/- 10%  As per chart, no edema or pressure injuries documented.  PMH TATYANA, type 2 DM, HLD, HTN, CKD stage 3, abdominal hernia, mitral regurgitation.

## 2019-07-11 NOTE — PROGRESS NOTE ADULT - SUBJECTIVE AND OBJECTIVE BOX
Patient is a 73y old  Male who presents with a chief complaint of symptomatic anemia (06 Jul 2019 11:55)    SUBJECTIVE / OVERNIGHT EVENTS:  Seen and eval. Hemodynamically stable. Denies any HA, CP, SOB. care discussed with surgical team -  patient is planned to have surgery tomorrow afternoon. As per Surgical blue team - if HH low they will transfuse tomorrow. NPO after midnight.     REVIEW OF SYSTEMS:  General: NAD, hemodynamically stable   HEENT:  Eyes:  No visual loss  SKIN:  No rash or itching.  CARDIOVASCULAR:  No chest pain   RESPIRATORY:  No shortness of breath   GASTROINTESTINAL:  no dark stools  NEUROLOGICAL:  No headache, dizziness, syncope, paralysis, ataxia, numbness or tingling in the extremities. No change in bowel or bladder control.  MUSCULOSKELETAL:  No muscle, back pain, joint pain or stiffness.  HEMATOLOGIC:  + anemia  LYMPHATICS:  No enlarged nodes. No history of splenectomy.  ENDOCRINOLOGIC:  No reports of sweating, cold or heat intolerance. No polyuria or polydipsia.  ALLERGIES:  No history of asthma, hives, eczema or rhinitis.    PHYSICAL EXAM:  T(C): 37 (11 Jul 2019 04:11), Max: 37 (11 Jul 2019 04:11)  T(F): 98.6 (11 Jul 2019 04:11), Max: 98.6 (11 Jul 2019 04:11)  HR: 61 (11 Jul 2019 10:51) (55 - 86)  BP: 110/61 (11 Jul 2019 10:51) (109/61 - 131/647)  RR: 17 (11 Jul 2019 04:11) (17 - 18)  SpO2: 98% (11 Jul 2019 10:16) (95% - 98%)  GENERAL:  Well appearing M, in NAD  SKIN: pale  HEAD:  NCAT  EYES: PERRLA  NECK: Supple, No JVD  CHEST/LUNG: CTA B/L. No w/r/r.  HEART: Reg rate. Normal S1, S2. No m/r/g.   ABDOMEN: SNT, obese  EXTREMITIES:  2+ Peripheral Pulses, No clubbing, cyanosis, edema.  PSYCH: appropriate affect    LABS:               CBC Full  -  ( 11 Jul 2019 10:50 )  WBC Count : 6.0 K/uL  RBC Count : 2.79 M/uL  Hemoglobin : 8.3 g/dL  Hematocrit : 24.6 %  Platelet Count - Automated : 157 K/uL    PT/INR - ( 11 Jul 2019 08:24 )   PT: 14.0 sec;   INR: 1.23 ratio       PTT - ( 11 Jul 2019 08:24 )  PTT:33.0 sec    142  |  104  |  16  ----------------------------<  120<H>  4.1   |  26  |  1.56<H>    Ca    8.8      11 Jul 2019 05:52  Phos  3.1     07-11  Mg     2.1     07-11    TPro  6.2  /  Alb  3.8  /  TBili  0.3  /  DBili  x   /  AST  17  /  ALT  14  /  AlkPhos  63  07-11 Patient is a 73y old  Male who presents with a chief complaint of symptomatic anemia (06 Jul 2019 11:55)    SUBJECTIVE / OVERNIGHT EVENTS:  Seen and eval. Hemodynamically stable. Denies any HA, CP, SOB. care discussed with surgical team -  patient is planned to have surgery tomorrow afternoon. As per Surgical blue team - if HH low they will transfuse tomorrow. Care discussed with heme /  onc. NPO after midnight.     REVIEW OF SYSTEMS:  General: NAD, hemodynamically stable   HEENT:  Eyes:  No visual loss  SKIN:  No rash or itching.  CARDIOVASCULAR:  No chest pain   RESPIRATORY:  No shortness of breath   GASTROINTESTINAL:  no dark stools  NEUROLOGICAL:  No headache, dizziness, syncope, paralysis, ataxia, numbness or tingling in the extremities. No change in bowel or bladder control.  MUSCULOSKELETAL:  No muscle, back pain, joint pain or stiffness.  HEMATOLOGIC:  + anemia  LYMPHATICS:  No enlarged nodes. No history of splenectomy.  ENDOCRINOLOGIC:  No reports of sweating, cold or heat intolerance. No polyuria or polydipsia.  ALLERGIES:  No history of asthma, hives, eczema or rhinitis.    PHYSICAL EXAM:  T(C): 37 (11 Jul 2019 04:11), Max: 37 (11 Jul 2019 04:11)  T(F): 98.6 (11 Jul 2019 04:11), Max: 98.6 (11 Jul 2019 04:11)  HR: 61 (11 Jul 2019 10:51) (55 - 86)  BP: 110/61 (11 Jul 2019 10:51) (109/61 - 131/647)  RR: 17 (11 Jul 2019 04:11) (17 - 18)  SpO2: 98% (11 Jul 2019 10:16) (95% - 98%)  GENERAL:  Well appearing M, in NAD  SKIN: pale  HEAD:  NCAT  EYES: PERRLA  NECK: Supple, No JVD  CHEST/LUNG: CTA B/L. No w/r/r.  HEART: Reg rate. Normal S1, S2. No m/r/g.   ABDOMEN: SNT, obese  EXTREMITIES:  2+ Peripheral Pulses, No clubbing, cyanosis, edema.  PSYCH: appropriate affect    LABS:               CBC Full  -  ( 11 Jul 2019 10:50 )  WBC Count : 6.0 K/uL  RBC Count : 2.79 M/uL  Hemoglobin : 8.3 g/dL  Hematocrit : 24.6 %  Platelet Count - Automated : 157 K/uL    PT/INR - ( 11 Jul 2019 08:24 )   PT: 14.0 sec;   INR: 1.23 ratio       PTT - ( 11 Jul 2019 08:24 )  PTT:33.0 sec    142  |  104  |  16  ----------------------------<  120<H>  4.1   |  26  |  1.56<H>    Ca    8.8      11 Jul 2019 05:52  Phos  3.1     07-11  Mg     2.1     07-11    TPro  6.2  /  Alb  3.8  /  TBili  0.3  /  DBili  x   /  AST  17  /  ALT  14  /  AlkPhos  63  07-11

## 2019-07-11 NOTE — DIETITIAN INITIAL EVALUATION ADULT. - PERTINENT LABORATORY DATA
Fingersticks 7/11/2019 126, 7/10 115-179, 7/9 , 7/11/2019 Creatinine 1.56 (H), Glucose 120 (H), 7/3/2019 HgbA1c 5.4%

## 2019-07-11 NOTE — DIETITIAN INITIAL EVALUATION ADULT. - OTHER INFO
Source: Patient, comprehensive chart review.  •         Diet PTA: Pt reports to following an overall well-balanced diet. Diet recall noted. For breakfast, Pt will consume eggs with either cereal or a bagel. Lunch would consist of a sandwich. Dinner would be chicken, steak or fish with a starch and vegetables. Pt reports his wife usually does the cooking at home, however he likes to grill protein and vegetables in the summer time.   •         Nutrition Status PTA: Good. Pt reports his appetite has been the same since PTA. Consuming >75% of meals. Pt with history of type 2 DM. As per this admission, Pt's HgbA1c from 7/3/19 is 5.4%, indicating good glycemic control. Pt states he checks his blood glucose levels 1-2x/day. Takes glipizide at home daily PTA. Has received DM education in the past.   •         Nutrition Supplements PTA: Pt states he takes vitamin D and B12 daily at home.   •         Food Allergies: None reported.  •         Weight History PTA: Pt denies any changes in weight. Weight noted consistently stable around 270 pounds. Most recent weight from 7/11 documented at 264.5 pounds. Dosing weight from 7/10 documented at 261 pounds.   •         Other Subjective Information: No reports of nausea/vomiting/diarrhea/constipation. Last BM reported last night, no issues. No reported difficulties chewing/swallowing.   •         Therapeutic Diet Education Provided: Reviewed well-balanced meals, sources of foods high and low in carbohydrates, carbohydrate counting, food sources high in sodium, hypoglycemia/hyperglycemia. Provided Pt with verbal and written heart healthy/type 2 DM education. Source: Patient, comprehensive chart review.  •         Diet PTA: Pt reports to following an overall well-balanced diet. Diet recall noted. For breakfast, Pt will consume eggs with either cereal or a bagel. Lunch would consist of a sandwich. Dinner would be chicken, steak or fish with a starch and vegetables. Pt reports his wife usually does the cooking at home, however he likes to grill protein and vegetables in the summer time.   •         Nutrition Status PTA: Good. Pt reports his appetite has been the same since PTA. Consuming >75% of meals. Pt with history of type 2 DM. As per this admission, Pt's HgbA1c from 7/3/19 is 5.4%, indicating good glycemic control. Pt states he checks his blood glucose levels 1-2x/day. Takes glipizide at home daily PTA. Has received DM education in the past.   •         Nutrition Supplements PTA: Pt states he takes vitamin D and B12 daily at home.   •         Food Allergies: None reported.  •         Weight History PTA: Pt denies any changes in weight. Weight noted consistently stable around 270 pounds. Most recent weight from 7/11 documented at 264.5 pounds. Dosing weight from 7/10 documented at 261 pounds.   •         Other Subjective Information: No reports of nausea/vomiting/diarrhea/constipation. Last BM reported last night, no issues. No reported difficulties chewing/swallowing.   •         Therapeutic Diet Education Provided: Reviewed well-balanced meals, sources of foods high and low in carbohydrates, carbohydrate counting, portion control, food sources high in sodium, hypoglycemia/hyperglycemia. Provided Pt with verbal and written heart healthy/type 2 DM education. Source: Patient, comprehensive chart review.  •         Diet PTA: Pt reports to following an overall well-balanced diet. Diet recall noted. For breakfast, Pt will consume eggs with either cereal or a bagel. Lunch would consist of a sandwich. Dinner would be chicken, steak or fish with a starch and vegetables. Pt reports his wife usually does the cooking at home, however he likes to grill protein and vegetables in the summer time.   •         Nutrition Status PTA: Good. Pt reports his appetite has been the same since PTA. Consuming >75% of meals. Pt with history of type 2 DM. As per this admission, Pt's HgbA1c from 7/3/19 is 5.4%, indicating good glycemic control. Pt states he checks his blood glucose levels 1-2x/day. Takes glipizide at home daily PTA. Has received DM education in the past.   •         Nutrition Supplements PTA: Pt states he takes vitamin D and B12 daily at home.   •         Food Allergies: None reported.  •         Weight History PTA: Pt denies any changes in weight. Weight noted consistently stable around 270 pounds. Most recent weight from 7/11 documented at 264.5 pounds. Dosing weight from 7/10 documented at 261 pounds.   •         Other Subjective Information: No reports of nausea/vomiting/diarrhea/constipation. Last BM reported last night, no issues. No reported difficulties chewing/swallowing.   •         Therapeutic Diet Education Provided: Reviewed well-balanced meals, sources of foods high and low in carbohydrates, carbohydrate counting, portion control, food sources high in sodium, hypoglycemia/hyperglycemia. Provided Pt with verbal and written heart healthy/type 2 DM education. In context of surgery, encourage optimal protein/energy intake prior to and post-surgery. If Pt's appetite poor, focus on encouraging good PO intake, honor food preferences as able, offer nutritional supplement. Continue to monitor.

## 2019-07-11 NOTE — PROGRESS NOTE ADULT - ASSESSMENT
7/5/19 the p over the last few weeks has been weak dizzy and easily fatigued. He went to see his pmd and the hemoglobin was noted to be in the 8 range where a few months earlier it was 12. He has been under the care of Dr Marc Mai and his hemoglobin was always about 12. He may have been experiencing dark stools over the last few weeks and has not been taking iron pills. The guaiac here was negative for blood. The pt was seen by pulmonary here for interstitial changes in the lungs and will have a ct scan to see if further studies are needed. He was a former . The GFR was 41 and bun was 26 and the white cell count was 6.6 hemoglobin 8.1 and MCV of 91 and MCHC of 29.8 and rdw of 16.5 . The ferritin was only 17 and this suggests iron deficiency anemia and he is to have a EGD and colonoscopy and I will give him iv iron to see if there is a response and he can follow with Dr Mai from heme onc.  7/8 pt receiving iv iron and gi eval in process, hemoglobin was 8.6.  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 The iron continues and will continue to check the cbc for now.  7/10 the pt was feeling well but the hemoglobin is down to 7.5 likely from bleeding. He will likely need transfusion before the surgery.    7/11 surgery scheduled on Friday and case again gone over with [facility] pt and the family at the bedside The hemoglobin was up to 8.3 and no recent transfusion was given.

## 2019-07-11 NOTE — PROGRESS NOTE ADULT - ASSESSMENT
74 y/o m pmhx HTN, HLD, TATYANA, CKD III and DMII sent in for angiogram given LAUREN/CP found to have symptomatic anemia, now for EGD/cscope 7/8.     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 Ulcerated and excavated with visible vessel s/p clip placement  - s/p endoscopy / colonoscopy 7 / 8   - started on IV Venofer  - HH all over the place  - CT A/P with diverticulosis   - started on IV Protonix gtt  - patient will need CT abd /  pelvis for further staging purposes  - plan for OR with Dr. Stewart Friday July 12 for partial gastrectomy   - CT: A 3.2 x 5.0 cm ulcerating gastric cardia/fundal mass. No evidence of metastatic disease in the chest, abdomen and pelvis  - Medicine: pt is medically optimized for gastric resection  - GI following  - Surgery following  - Heme / onc following    # Dyspnea on exertion  - Suspect secondary to anemia. Less likely cardiac in nature given negative nuclear stress test. EKG nonischemic. PNA unlikely given lack of cough, fever, and PE signs. VTE also unlikely given lack of LE edema, tachycardia, and risk factors.  - CT: interstitial lung disease of undetermined etiology. 1.3 cm short axis subcarinal lymph node (series 3 image 84). Recommend follow-up chest CT in 3 months to determine stability.  - On RA, in no acute distress at this time  - pulm clearance for EGD/colonoscopy appreciated    # Dizziness  - Suspect secondary to anemia. Less likely cardiac in nature given negative nuclear stress test. Orthostatics negative. Exam nonfocal making stroke less likely.   - Anemia workup as above.  - Once workup complete, revisit need for ischemic workup with cardiology.     # Abnormal chest XRAY  - CT: interstitial lung disease of undetermined etiology. 1.3 cm short axis subcarinal lymph node (series 3 image 84).   Recommend follow-up chest CT in 3 months to determine stability.  - pulmonary evaluation appreciated. will follow outpatient.    # CKD (chronic kidney disease) stage 3, GFR 30-59 ml/min  - BUN/Cr ratio suggestive of chronic process. Unknown baseline.   - Scr 1.62-> 1.52-->1.65 --> 1.72 --> 1.7 -> 1.48  - hold hydralazine in setting of worsening Cr    # HTN (hypertension)  -  Continue home medications - labetalol, amlodipine    # Type 2 diabetes mellitus  - LSSI  - consistent carb / dash diet 72 y/o m pmhx HTN, HLD, TATYANA, CKD III and DMII sent in for angiogram given LAUREN/CP found to have symptomatic anemia, now for EGD/cscope 7/8.     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 Ulcerated and excavated with visible vessel s/p clip placement  - s/p endoscopy / colonoscopy 7/8   - started on IV Venofer  - HH all over the place  - CT A/P with diverticulosis   - started on IV Protonix gtt  - plan for OR with Dr. Stewart Friday July 12 for partial gastrectomy   - CT: A 3.2 x 5.0 cm ulcerating gastric cardia/fundal mass. No evidence of metastatic disease in the chest, abdomen and pelvis  - Medicine: pt is medically optimized for gastric resection  - GI following  - Surgery following  - Heme / onc following    # Dyspnea on exertion  - Suspect secondary to anemia. Less likely cardiac in nature given negative nuclear stress test. EKG nonischemic. PNA unlikely given lack of cough, fever, and PE signs. VTE also unlikely given lack of LE edema, tachycardia, and risk factors.  - CT: interstitial lung disease of undetermined etiology. 1.3 cm short axis subcarinal lymph node (series 3 image 84). Recommend follow-up chest CT in 3 months to determine stability.  - On RA, in no acute distress at this time  - pulm clearance for EGD/colonoscopy appreciated    # Dizziness  - Suspect secondary to anemia. Less likely cardiac in nature given negative nuclear stress test. Orthostatics negative. Exam nonfocal making stroke less likely.   - Anemia workup as above.  - Once workup complete, revisit need for ischemic workup with cardiology.     # Abnormal chest XRAY  - CT: interstitial lung disease of undetermined etiology. 1.3 cm short axis subcarinal lymph node (series 3 image 84).   Recommend follow-up chest CT in 3 months to determine stability.  - pulmonary evaluation appreciated. will follow outpatient.    # CKD (chronic kidney disease) stage 3, GFR 30-59 ml/min  - BUN/Cr ratio suggestive of chronic process. Unknown baseline.   - Scr 1.62-> 1.52-->1.65 --> 1.72 --> 1.7 -> 1.48  - hold hydralazine in setting of worsening Cr    # HTN (hypertension)  -  Continue home medications - labetalol, amlodipine    # Type 2 diabetes mellitus  - LSSI  - consistent carb / dash diet

## 2019-07-12 ENCOUNTER — RESULT REVIEW (OUTPATIENT)
Age: 74
End: 2019-07-12

## 2019-07-12 ENCOUNTER — APPOINTMENT (OUTPATIENT)
Dept: SURGICAL ONCOLOGY | Facility: HOSPITAL | Age: 74
End: 2019-07-12

## 2019-07-12 LAB
ANION GAP SERPL CALC-SCNC: 11 MMOL/L — SIGNIFICANT CHANGE UP (ref 5–17)
ANION GAP SERPL CALC-SCNC: 15 MMOL/L — SIGNIFICANT CHANGE UP (ref 5–17)
BUN SERPL-MCNC: 15 MG/DL — SIGNIFICANT CHANGE UP (ref 7–23)
BUN SERPL-MCNC: 16 MG/DL — SIGNIFICANT CHANGE UP (ref 7–23)
CALCIUM SERPL-MCNC: 7.9 MG/DL — LOW (ref 8.4–10.5)
CALCIUM SERPL-MCNC: 8.4 MG/DL — SIGNIFICANT CHANGE UP (ref 8.4–10.5)
CHLORIDE SERPL-SCNC: 103 MMOL/L — SIGNIFICANT CHANGE UP (ref 96–108)
CHLORIDE SERPL-SCNC: 104 MMOL/L — SIGNIFICANT CHANGE UP (ref 96–108)
CO2 SERPL-SCNC: 24 MMOL/L — SIGNIFICANT CHANGE UP (ref 22–31)
CO2 SERPL-SCNC: 26 MMOL/L — SIGNIFICANT CHANGE UP (ref 22–31)
CREAT SERPL-MCNC: 1.46 MG/DL — HIGH (ref 0.5–1.3)
CREAT SERPL-MCNC: 1.49 MG/DL — HIGH (ref 0.5–1.3)
GAS PNL BLDA: SIGNIFICANT CHANGE UP
GLUCOSE BLDC GLUCOMTR-MCNC: 119 MG/DL — HIGH (ref 70–99)
GLUCOSE SERPL-MCNC: 118 MG/DL — HIGH (ref 70–99)
GLUCOSE SERPL-MCNC: 166 MG/DL — HIGH (ref 70–99)
HCT VFR BLD CALC: 22.9 % — LOW (ref 39–50)
HCT VFR BLD CALC: 24.5 % — LOW (ref 39–50)
HGB BLD-MCNC: 7.6 G/DL — LOW (ref 13–17)
HGB BLD-MCNC: 8.2 G/DL — LOW (ref 13–17)
MAGNESIUM SERPL-MCNC: 2 MG/DL — SIGNIFICANT CHANGE UP (ref 1.6–2.6)
MAGNESIUM SERPL-MCNC: 2 MG/DL — SIGNIFICANT CHANGE UP (ref 1.6–2.6)
MCHC RBC-ENTMCNC: 29.5 PG — SIGNIFICANT CHANGE UP (ref 27–34)
MCHC RBC-ENTMCNC: 29.6 PG — SIGNIFICANT CHANGE UP (ref 27–34)
MCHC RBC-ENTMCNC: 33.3 GM/DL — SIGNIFICANT CHANGE UP (ref 32–36)
MCHC RBC-ENTMCNC: 33.3 GM/DL — SIGNIFICANT CHANGE UP (ref 32–36)
MCV RBC AUTO: 88.5 FL — SIGNIFICANT CHANGE UP (ref 80–100)
MCV RBC AUTO: 88.8 FL — SIGNIFICANT CHANGE UP (ref 80–100)
PHOSPHATE SERPL-MCNC: 2.6 MG/DL — SIGNIFICANT CHANGE UP (ref 2.5–4.5)
PHOSPHATE SERPL-MCNC: 3.4 MG/DL — SIGNIFICANT CHANGE UP (ref 2.5–4.5)
PLATELET # BLD AUTO: 161 K/UL — SIGNIFICANT CHANGE UP (ref 150–400)
PLATELET # BLD AUTO: 176 K/UL — SIGNIFICANT CHANGE UP (ref 150–400)
POTASSIUM SERPL-MCNC: 4.2 MMOL/L — SIGNIFICANT CHANGE UP (ref 3.5–5.3)
POTASSIUM SERPL-MCNC: 4.3 MMOL/L — SIGNIFICANT CHANGE UP (ref 3.5–5.3)
POTASSIUM SERPL-SCNC: 4.2 MMOL/L — SIGNIFICANT CHANGE UP (ref 3.5–5.3)
POTASSIUM SERPL-SCNC: 4.3 MMOL/L — SIGNIFICANT CHANGE UP (ref 3.5–5.3)
RBC # BLD: 2.58 M/UL — LOW (ref 4.2–5.8)
RBC # BLD: 2.77 M/UL — LOW (ref 4.2–5.8)
RBC # FLD: 16.6 % — HIGH (ref 10.3–14.5)
RBC # FLD: 16.6 % — HIGH (ref 10.3–14.5)
SODIUM SERPL-SCNC: 141 MMOL/L — SIGNIFICANT CHANGE UP (ref 135–145)
SODIUM SERPL-SCNC: 142 MMOL/L — SIGNIFICANT CHANGE UP (ref 135–145)
TROPONIN T, HIGH SENSITIVITY RESULT: 28 NG/L — SIGNIFICANT CHANGE UP (ref 0–51)
WBC # BLD: 6.1 K/UL — SIGNIFICANT CHANGE UP (ref 3.8–10.5)
WBC # BLD: 8.4 K/UL — SIGNIFICANT CHANGE UP (ref 3.8–10.5)
WBC # FLD AUTO: 6.1 K/UL — SIGNIFICANT CHANGE UP (ref 3.8–10.5)
WBC # FLD AUTO: 8.4 K/UL — SIGNIFICANT CHANGE UP (ref 3.8–10.5)

## 2019-07-12 PROCEDURE — 49203: CPT | Mod: 82

## 2019-07-12 PROCEDURE — 99232 SBSQ HOSP IP/OBS MODERATE 35: CPT | Mod: GC

## 2019-07-12 PROCEDURE — 43210 EGD ESOPHAGOGASTRC FNDOPLSTY: CPT

## 2019-07-12 PROCEDURE — 71045 X-RAY EXAM CHEST 1 VIEW: CPT | Mod: 26

## 2019-07-12 PROCEDURE — 88309 TISSUE EXAM BY PATHOLOGIST: CPT | Mod: 26

## 2019-07-12 PROCEDURE — 88342 IMHCHEM/IMCYTCHM 1ST ANTB: CPT | Mod: 26

## 2019-07-12 PROCEDURE — 49203: CPT

## 2019-07-12 PROCEDURE — 88331 PATH CONSLTJ SURG 1 BLK 1SPC: CPT | Mod: 26

## 2019-07-12 PROCEDURE — 99233 SBSQ HOSP IP/OBS HIGH 50: CPT

## 2019-07-12 PROCEDURE — 43611 EXCISION OF STOMACH LESION: CPT | Mod: 59

## 2019-07-12 PROCEDURE — 43611 EXCISION OF STOMACH LESION: CPT | Mod: 82

## 2019-07-12 RX ORDER — SODIUM CHLORIDE 9 MG/ML
1000 INJECTION, SOLUTION INTRAVENOUS
Refills: 0 | Status: DISCONTINUED | OUTPATIENT
Start: 2019-07-12 | End: 2019-07-16

## 2019-07-12 RX ORDER — PANTOPRAZOLE SODIUM 20 MG/1
40 TABLET, DELAYED RELEASE ORAL DAILY
Refills: 0 | Status: DISCONTINUED | OUTPATIENT
Start: 2019-07-12 | End: 2019-07-15

## 2019-07-12 RX ORDER — DEXTROSE 50 % IN WATER 50 %
15 SYRINGE (ML) INTRAVENOUS ONCE
Refills: 0 | Status: DISCONTINUED | OUTPATIENT
Start: 2019-07-12 | End: 2019-07-16

## 2019-07-12 RX ORDER — DEXTROSE 50 % IN WATER 50 %
25 SYRINGE (ML) INTRAVENOUS ONCE
Refills: 0 | Status: DISCONTINUED | OUTPATIENT
Start: 2019-07-12 | End: 2019-07-16

## 2019-07-12 RX ORDER — ENOXAPARIN SODIUM 100 MG/ML
40 INJECTION SUBCUTANEOUS EVERY 24 HOURS
Refills: 0 | Status: DISCONTINUED | OUTPATIENT
Start: 2019-07-12 | End: 2019-07-16

## 2019-07-12 RX ORDER — GLUCAGON INJECTION, SOLUTION 0.5 MG/.1ML
1 INJECTION, SOLUTION SUBCUTANEOUS ONCE
Refills: 0 | Status: DISCONTINUED | OUTPATIENT
Start: 2019-07-12 | End: 2019-07-16

## 2019-07-12 RX ORDER — SODIUM CHLORIDE 9 MG/ML
1000 INJECTION, SOLUTION INTRAVENOUS
Refills: 0 | Status: DISCONTINUED | OUTPATIENT
Start: 2019-07-12 | End: 2019-07-14

## 2019-07-12 RX ORDER — DEXTROSE 50 % IN WATER 50 %
12.5 SYRINGE (ML) INTRAVENOUS ONCE
Refills: 0 | Status: DISCONTINUED | OUTPATIENT
Start: 2019-07-12 | End: 2019-07-16

## 2019-07-12 RX ORDER — ACETAMINOPHEN 500 MG
1000 TABLET ORAL ONCE
Refills: 0 | Status: COMPLETED | OUTPATIENT
Start: 2019-07-12 | End: 2019-07-12

## 2019-07-12 RX ORDER — HYDROMORPHONE HYDROCHLORIDE 2 MG/ML
0.25 INJECTION INTRAMUSCULAR; INTRAVENOUS; SUBCUTANEOUS
Refills: 0 | Status: DISCONTINUED | OUTPATIENT
Start: 2019-07-12 | End: 2019-07-12

## 2019-07-12 RX ORDER — CEFOTETAN DISODIUM 1 G
2 VIAL (EA) INJECTION EVERY 12 HOURS
Refills: 0 | Status: COMPLETED | OUTPATIENT
Start: 2019-07-12 | End: 2019-07-13

## 2019-07-12 RX ORDER — METOPROLOL TARTRATE 50 MG
5 TABLET ORAL EVERY 6 HOURS
Refills: 0 | Status: DISCONTINUED | OUTPATIENT
Start: 2019-07-12 | End: 2019-07-15

## 2019-07-12 RX ORDER — INSULIN LISPRO 100/ML
VIAL (ML) SUBCUTANEOUS EVERY 6 HOURS
Refills: 0 | Status: DISCONTINUED | OUTPATIENT
Start: 2019-07-12 | End: 2019-07-15

## 2019-07-12 RX ADMIN — Medication 5 MILLIGRAM(S): at 20:41

## 2019-07-12 RX ADMIN — Medication 400 MILLIGRAM(S): at 23:58

## 2019-07-12 RX ADMIN — SODIUM CHLORIDE 3 MILLILITER(S): 9 INJECTION INTRAMUSCULAR; INTRAVENOUS; SUBCUTANEOUS at 22:16

## 2019-07-12 RX ADMIN — SODIUM CHLORIDE 3 MILLILITER(S): 9 INJECTION INTRAMUSCULAR; INTRAVENOUS; SUBCUTANEOUS at 05:27

## 2019-07-12 RX ADMIN — DEXTROSE MONOHYDRATE, SODIUM CHLORIDE, AND POTASSIUM CHLORIDE 100 MILLILITER(S): 50; .745; 4.5 INJECTION, SOLUTION INTRAVENOUS at 00:37

## 2019-07-12 NOTE — PROGRESS NOTE ADULT - ASSESSMENT
7/5/19 the p over the last few weeks has been weak dizzy and easily fatigued. He went to see his pmd and the hemoglobin was noted to be in the 8 range where a few months earlier it was 12. He has been under the care of Dr Marc Mai and his hemoglobin was always about 12. He may have been experiencing dark stools over the last few weeks and has not been taking iron pills. The guaiac here was negative for blood. The pt was seen by pulmonary here for interstitial changes in the lungs and will have a ct scan to see if further studies are needed. He was a former . The GFR was 41 and bun was 26 and the white cell count was 6.6 hemoglobin 8.1 and MCV of 91 and MCHC of 29.8 and rdw of 16.5 . The ferritin was only 17 and this suggests iron deficiency anemia and he is to have a EGD and colonoscopy and I will give him iv iron to see if there is a response and he can follow with Dr Mai from heme onc.  7/8 pt receiving iv iron and gi eval in process, hemoglobin was 8.6.  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 The iron continues and will continue to check the cbc for now.  7/10 the pt was feeling well but the hemoglobin is down to 7.5 likely from bleeding. He will likely need transfusion before the surgery.    7/11 surgery scheduled on Friday and case again gone over with [facility] pt and the family at the bedside The hemoglobin was up to 8.3 and no recent transfusion was given.   7/12 pt off to the Or and transfusions as needed to be done there.

## 2019-07-12 NOTE — BRIEF OPERATIVE NOTE - NSICDXBRIEFPOSTOP_GEN_ALL_CORE_FT
POST-OP DIAGNOSIS:  Gastrointestinal stromal tumor (GIST) of stomach 12-Jul-2019 17:40:20  Yuriy Huffman

## 2019-07-12 NOTE — BRIEF OPERATIVE NOTE - OPERATION/FINDINGS
Endophytic submucosal mass located in the lesser curvature. Gastrotomy performed, mass inverted out of the stomach, stapled using endoGIA purple loads. Gastrotomy closed in single-layer fashion using vlok suture with lembert sutures placed on distal aspect.

## 2019-07-12 NOTE — PROGRESS NOTE ADULT - ASSESSMENT
73M PMHx CKD3, TATYANA, HTN, HLD, DM2 who present on 7/2 for elective cardiac angiogram w/ preop lab (prior [facility]) significant for acute anemia (8.4, baseline 12 in May 2019) thereby admit to medicine for anemia workup.  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      Nephrology consulted for risk stratification of contrast induced nephropathy and/or requiring dialysis. On admission Cr 1.6, peaked 1.72 and currently 1.51 - patient state able make good urine output

## 2019-07-12 NOTE — BRIEF OPERATIVE NOTE - NSICDXBRIEFPREOP_GEN_ALL_CORE_FT
PRE-OP DIAGNOSIS:  Gastrointestinal stromal tumor (GIST) of stomach 12-Jul-2019 17:40:12  Yuriy Huffman

## 2019-07-12 NOTE — PROGRESS NOTE ADULT - SUBJECTIVE AND OBJECTIVE BOX
[facility] DIVISION OF KIDNEY DISEASES AND HYPERTENSION -- FOLLOW UP NOTE  --------------------------------------------------------------------------------  24 hour events/subjective:  - pt seen and examined w/ wife at bedside in good spirit, plan surgical removal today  - lab noted stable Cr      PAST HISTORY  --------------------------------------------------------------------------------  No significant changes to PMH, PSH, FHx, SHx, unless otherwise noted    ALLERGIES & MEDICATIONS  --------------------------------------------------------------------------------  Allergies    No Known Allergies    Intolerances      Standing Inpatient Medications  amLODIPine   Tablet 10 milliGRAM(s) Oral daily  atorvastatin 20 milliGRAM(s) Oral at bedtime  insulin lispro (HumaLOG) corrective regimen sliding scale   SubCutaneous three times a day before meals  labetalol 200 milliGRAM(s) Oral two times a day  losartan 50 milliGRAM(s) Oral daily  pantoprazole Infusion 8 mG/Hr IV Continuous <Continuous>  sodium chloride 0.45% with potassium chloride 20 mEq/L 1000 milliLiter(s) IV Continuous <Continuous>  sodium chloride 0.9% lock flush 3 milliLiter(s) IV Push every 8 hours  sodium chloride 0.9%. 1000 milliLiter(s) IV Continuous <Continuous>    PRN Inpatient Medications      REVIEW OF SYSTEMS  --------------------------------------------------------------------------------  denies c/p, sob, abdominal pain, n/v/d, f/c    All other systems were reviewed and are negative, except as noted.    VITALS/PHYSICAL EXAM  --------------------------------------------------------------------------------  T(C): 36.8 (07-12-19 @ 11:35), Max: 37 (07-11-19 @ 20:33)  HR: 66 (07-12-19 @ 11:35) (60 - 75)  BP: 133/68 (07-12-19 @ 11:35) (105/53 - 133/68)  RR: 16 (07-12-19 @ 11:35) (16 - 18)  SpO2: 97% (07-12-19 @ 11:35) (96% - 98%)  Wt(kg): --        07-11-19 @ 07:01  -  07-12-19 @ 07:00  --------------------------------------------------------  IN: 2900 mL / OUT: 0 mL / NET: 2900 mL    07-12-19 @ 07:01  -  07-12-19 @ 17:12  --------------------------------------------------------  IN: 0 mL / OUT: 0 mL / NET: 0 mL      Physical Exam:  	Gen: NAD, well-appearing  	Pulm: CTA B/L  	CV: RRR, S1S2; no rub  	Abd: +BS, soft, nontender/nondistended  	Ext: no edema    LABS/STUDIES  --------------------------------------------------------------------------------              7.6    6.1   >-----------<  161      [07-12-19 @ 05:41]              22.9     141  |  104  |  15  ----------------------------<  118      [07-12-19 @ 05:39]  4.2   |  26  |  1.49        Ca     8.4     [07-12-19 @ 05:39]      Mg     2.0     [07-12-19 @ 05:39]      Phos  2.6     [07-12-19 @ 05:39]    TPro  6.2  /  Alb  3.8  /  TBili  0.3  /  DBili  x   /  AST  17  /  ALT  14  /  AlkPhos  63  [07-11-19 @ 05:52]    PT/INR: PT 14.0 , INR 1.23       [07-11-19 @ 08:24]  PTT: 33.0       [07-11-19 @ 08:24]      Creatinine Trend:  SCr 1.49 [07-12 @ 05:39]  SCr 1.56 [07-11 @ 05:52]  SCr 1.47 [07-10 @ 05:05]  SCr 1.51 [07-09 @ 05:30]  SCr 1.48 [07-08 @ 05:36]        Iron 40, TIBC 318, %sat 13      [07-03-19 @ 09:21]  Ferritin 17      [07-03-19 @ 11:39]  HbA1c 5.4      [07-03-19 @ 11:17]  TSH 2.99      [07-03-19 @ 11:39]      ELOISA: titer Negative, pattern --      [07-06-19 @ 12:15]  Rheumatoid Factor <10      [07-06-19 @ 10:09]  ANCA: cANCA Negative, pANCA Negative, atypical ANCA Negative      [07-06-19 @ 12:15]

## 2019-07-12 NOTE — PROGRESS NOTE ADULT - SUBJECTIVE AND OBJECTIVE BOX
[facility] Progress Note    SUBJECTIVE: Pt seen and examined at bedside. Denies pain, nausea, vomiting. Says he is prepared for surgery this afternoon with Dr. Stewart and has had nothing to eat or drink since midnight.       MEDICATIONS  (STANDING):  amLODIPine   Tablet 10 milliGRAM(s) Oral daily  atorvastatin 20 milliGRAM(s) Oral at bedtime  insulin lispro (HumaLOG) corrective regimen sliding scale   SubCutaneous three times a day before meals  labetalol 200 milliGRAM(s) Oral two times a day  losartan 50 milliGRAM(s) Oral daily  pantoprazole Infusion 8 mG/Hr (10 mL/Hr) IV Continuous <Continuous>  sodium chloride 0.45% with potassium chloride 20 mEq/L 1000 milliLiter(s) (100 mL/Hr) IV Continuous <Continuous>  sodium chloride 0.9% lock flush 3 milliLiter(s) IV Push every 8 hours  sodium chloride 0.9%. 1000 milliLiter(s) (50 mL/Hr) IV Continuous <Continuous>    MEDICATIONS  (PRN):      OBJECTIVE:    Vital Signs Last 24 Hrs  T(C): 37 (12 Jul 2019 04:51), Max: 37 (11 Jul 2019 20:33)  T(F): 98.6 (12 Jul 2019 04:51), Max: 98.6 (11 Jul 2019 20:33)  HR: 75 (12 Jul 2019 06:20) (60 - 85)  BP: 105/53 (12 Jul 2019 04:51) (105/53 - 129/66)  BP(mean): --  RR: 16 (12 Jul 2019 04:51) (16 - 18)  SpO2: 96% (12 Jul 2019 06:20) (95% - 99%)    General Appearance: NAD  Neck: Supple  Chest: non-labored breathing, no respiratory distress, wearing CPAP machine  CV: Pulse regular  Abdomen: Soft, non-tender, non-distended  Extremities: warm and well perfused    I&O's Summary    11 Jul 2019 07:01  -  12 Jul 2019 07:00  --------------------------------------------------------  IN: 2900 mL / OUT: 0 mL / NET: 2900 mL      I&O's Detail    11 Jul 2019 07:01  -  12 Jul 2019 07:00  --------------------------------------------------------  IN:    Oral Fluid: 1830 mL    pantoprazole Infusion: 120 mL    sodium chloride 0.45% with potassium chloride 20 mEq/L: 700 mL    sodium chloride 0.9%.: 250 mL  Total IN: 2900 mL    OUT:  Total OUT: 0 mL    Total NET: 2900 mL            LABS:                        7.6    6.1   )-----------( 161      ( 12 Jul 2019 05:41 )             22.9     07-12    141  |  104  |  15  ----------------------------<  118<H>  4.2   |  26  |  1.49<H>    Ca    8.4      12 Jul 2019 05:39  Phos  2.6     07-12  Mg     2.0     07-12    TPro  6.2  /  Alb  3.8  /  TBili  0.3  /  DBili  x   /  AST  17  /  ALT  14  /  AlkPhos  63  07-11    PT/INR - ( 11 Jul 2019 08:24 )   PT: 14.0 sec;   INR: 1.23 ratio         PTT - ( 11 Jul 2019 08:24 )  PTT:33.0 sec      RADIOLOGY & ADDITIONAL STUDIES:

## 2019-07-12 NOTE — PROGRESS NOTE ADULT - PROBLEM SELECTOR PLAN 1
CKD (chronic kidney disease) stage 3, GFR 30-59 ml/min. Recommendation: - On admission Cr 1.6, peaked 1.72 and currently 1.51  - patient state able make good urine output  - risk FRANK = 14%  - risk of post FRANK requiring HD = 0.12%  - s/p CT w/ contrast w/ stable Cr plan for surgical removal of GIST    PLAN:   - monitor Cr  - avoid nephrotoxic agents  - renally dose medications    Nephrology signing off, please re-consult if needed.  thank you

## 2019-07-12 NOTE — PROGRESS NOTE ADULT - SUBJECTIVE AND OBJECTIVE BOX
Patient is a 73y old  Male who presents with a chief complaint of symptomatic anemia (05 Jul 2019 08:54)      HPI:  74 yo  male (no implantable devices) PMH HTN, HLD, TATYANA, CKD III and DMT2 (a1c June 2019, managed by PCP, uncomplicated) presents for cardiac angiogram. Patient reports occasional episodes of moderate chest discomfort associated with dizziness, dyspnea and lightheadedness with moderate exertion x 2 weeks ago. Seen and evaluated by Dr. Mortensen (cards) recommended to have nuclear stress test which was completed on 6/25/19 revealing normal results, normal EF and wall motion, diaphragmatic attenuation. Symptoms still persist- recommends to have LHC for further ischemic evaluation. (02 Jul 2019 09:24)    7/5/19 the p over the last few weeks has been weak dizzy and easily fatigued. He went to see his pmd and the hemoglobin was noted to be in the 8 range where a few months earlier it was 12. He has been under the care of Dr Marc Mai and his hemoglobin was always about 12. He may have been experiencing dark stools over the last few weeks and has not been taking iron pills. The guaiac here was negative for blood. The pt was seen by pulmonary here for interstitial changes in the lungs and will have a ct scan to see if further studies are needed. He was a former . The GFR was 41 and bun was 26 and the white cell count was 6.6 hemoglobin 8.1 and MCV of 91 and MCHC of 29.8 and rdw of 16.5 . The ferritin was only 17 and this suggests iron deficiency anemia and he is to have a EGD and colonoscopy and I will give him iv iron to see if there is a response and he can follow with Dr Mai from heme onc.  7/8 pt receiving iv iron and gi eval in process, hemoglobin was 8.6. 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 The iron continues and will continue to check the cbc for now. 7/10 the pt was feeling well but the hemoglobin is down to 7.5 likely from bleeding. He will likely need transfusion before the surgery. 7/11 surgery scheduled on Friday and case again gone over with [facility] pt and the family at the bedside The hemoglobin was up to 8.3 and no recent transfusion was given. 7/12 pt off to the Or and transfusions as needed to be done there.  ICU Vital Signs Last 24 Hrs  T(C): 36.8 (12 Jul 2019 11:35), Max: 37 (11 Jul 2019 20:33)  T(F): 98.2 (12 Jul 2019 11:35), Max: 98.6 (11 Jul 2019 20:33)  HR: 66 (12 Jul 2019 11:35) (60 - 75)  BP: 133/68 (12 Jul 2019 11:35) (105/53 - 133/68)  BP(mean): --  ABP: --  ABP(mean): --  RR: 16 (12 Jul 2019 11:35) (16 - 18)  SpO2: 97% (12 Jul 2019 11:35) (96% - 98%)                         7.6    6.1   )-----------( 161      ( 12 Jul 2019 05:41 )             22.9     07-05    141  |  100  |  26<H>  ----------------------------<  116<H>  3.9   |  26  |  1.65<H>    Ca    9.1      05 Jul 2019 06:33  Phos  3.2     07-04  Mg     2.4     07-04      PT/INR - ( 04 Jul 2019 08:53 )   PT: 13.5 sec;   INR: 1.18 ratio         PTT - ( 04 Jul 2019 08:53 )  PTT:34.7 sec      ROS:  Negative except for:    PAST MEDICAL & SURGICAL HISTORY:  TATYANA (obstructive sleep apnea)  Mitral regurgitation  Diabetes  HLD (hyperlipidemia)  HTN (hypertension)  Abdominal hernia  Uncontrolled hypertension: BP ranged 170-250/--asymptomatic. Sent from [facility] office on 8/12/13 to [facility] ER via ambulance  Snoring: TATYANA precautions--responds affirmatively to STOP BANG questionnaire--admits to loud snoring and daytime somnolence; witnessed apneic events by wife; age &gt; 50; gender, male  Mass: right foot soft tissue mass in August 2013  Tooth decay: surgery for tooth extraction  S/P tonsillectomy      SOCIAL HISTORY:    FAMILY HISTORY:      Allergies    No Known Allergies    Intolerances        PHYSICAL EXAM off to the OR at time of visit last exam  General: adult in NAD  HEENT: clear oropharynx, anicteric sclera, pink conjunctivae  Neck: supple  CV: normal S1S2 with no murmur rubs or gallops  Lungs: clear to auscultation, no wheezes, no rales  Abdomen: soft non-tender non-distended, no hepatosplenomegaly  Ext: no clubbing cyanosis or edema  Skin: no rashes and no petechiae  Neuro: alert and oriented X3 no focal deficits    BLOOD SMEAR INTERPRETATION:    RADIOLOGY :

## 2019-07-12 NOTE — PROGRESS NOTE ADULT - SUBJECTIVE AND OBJECTIVE BOX
Patient is a 73y old  Male who presents with a chief complaint of symptomatic anemia (06 Jul 2019 11:55)    SUBJECTIVE / OVERNIGHT EVENTS:  Seen and eval. Patient medically optimized for partial gastrectomy this afternoon. No overnight events.  HH 7.6.     REVIEW OF SYSTEMS:  General: NAD, hemodynamically stable   HEENT:  Eyes:  No visual loss  SKIN:  No rash or itching.  CARDIOVASCULAR:  No chest pain   RESPIRATORY:  No shortness of breath   GASTROINTESTINAL:  no dark stools  NEUROLOGICAL:  No headache, dizziness, syncope, paralysis, ataxia, numbness or tingling in the extremities. No change in bowel or bladder control.  MUSCULOSKELETAL:  No muscle, back pain, joint pain or stiffness.  HEMATOLOGIC:  + anemia  LYMPHATICS:  No enlarged nodes. No history of splenectomy.  ENDOCRINOLOGIC:  No reports of sweating, cold or heat intolerance. No polyuria or polydipsia.  ALLERGIES:  No history of asthma, hives, eczema or rhinitis.    PHYSICAL EXAM:    T(C): 37 (12 Jul 2019 04:51), Max: 37 (11 Jul 2019 20:33)  T(F): 98.6 (12 Jul 2019 04:51), Max: 98.6 (11 Jul 2019 20:33)  HR: 75 (12 Jul 2019 06:20) (60 - 75)  BP: 105/53 (12 Jul 2019 04:51) (105/53 - 129/66)  RR: 16 (12 Jul 2019 04:51) (16 - 18)  SpO2: 96% (12 Jul 2019 06:20) (96% - 99%)    SKIN: pale  HEAD:  NCAT  EYES: PERRLA  NECK: Supple, No JVD  CHEST/LUNG: CTA B/L. No w/r/r.  HEART: Reg rate. Normal S1, S2. No m/r/g.   ABDOMEN: SNT, obese  EXTREMITIES:  2+ Peripheral Pulses, No clubbing, cyanosis, edema.  PSYCH: appropriate affect    LABS:               CBC Full  -  ( 12 Jul 2019 05:41 )  WBC Count : 6.1 K/uL  RBC Count : 2.58 M/uL  Hemoglobin : 7.6 g/dL  Hematocrit : 22.9 %  Platelet Count - Automated : 161 K/uL    PT/INR - ( 11 Jul 2019 08:24 )   PT: 14.0 sec;   INR: 1.23 ratio       PTT - ( 11 Jul 2019 08:24 )  PTT:33.0 sec    07-12    141  |  104  |  15  ----------------------------<  118<H>  4.2   |  26  |  1.49<H>    Ca    8.4      12 Jul 2019 05:39  Phos  2.6     07-12  Mg     2.0     07-12    TPro  6.2  /  Alb  3.8  /  TBili  0.3  /  DBili  x   /  AST  17  /  ALT  14  /  AlkPhos  63  07-11

## 2019-07-12 NOTE — PROGRESS NOTE ADULT - ASSESSMENT
73y Male PMH HTN, HLD, TATYANA, CKD III and DMII initially presenting with asymptomatic anemia found to have bleeding ulceration of lesser curvature of the stomach by EGD concerning for GIST, plan for surgical removal and pathology today 7/12 with Dr. Stewart    - overnight Hgb 8.0, this AM Hgb 7.6, hold transfusion for now, continue to monitor H/H intraop and postop  - Medical clearance for OR by cardiology and renal completed.  - will follow up with further plan in post-op note.     [facility]  Page [phone].

## 2019-07-12 NOTE — CHART NOTE - NSCHARTNOTEFT_GEN_A_CORE
POST-OPERATIVE NOTE    Subjective:  Patient is s/p partial gastrectomy. Patient denies chest pain, sob, chills fever, nausea. Pain is well controlled.    Physical Exam:  General: NAD, resting comfortably in bed  HEENT: NGT secured in place with gastric contents in canister  Pulmonary: Nonlabored breathing, no respiratory distress  Cardiovascular: RRR  Abdominal: softly distended, nontender, dressings c/d/i  Extremities: WWP    Assessment:  The patient is a 73y Male who is now several hours post-op from a partial gastrectomy.    Plan:  - Pain control as needed  - 2L NC, monitor respiratory status  - NPO, NGT, IVF  - DVT ppx  - OOB and ambulating as tolerated  - F/u AM labs

## 2019-07-13 DIAGNOSIS — D64.9 ANEMIA, UNSPECIFIED: ICD-10-CM

## 2019-07-13 DIAGNOSIS — C49.A2 GASTROINTESTINAL STROMAL TUMOR OF STOMACH: ICD-10-CM

## 2019-07-13 LAB
ANION GAP SERPL CALC-SCNC: 13 MMOL/L — SIGNIFICANT CHANGE UP (ref 5–17)
BUN SERPL-MCNC: 18 MG/DL — SIGNIFICANT CHANGE UP (ref 7–23)
CALCIUM SERPL-MCNC: 8.3 MG/DL — LOW (ref 8.4–10.5)
CHLORIDE SERPL-SCNC: 102 MMOL/L — SIGNIFICANT CHANGE UP (ref 96–108)
CO2 SERPL-SCNC: 26 MMOL/L — SIGNIFICANT CHANGE UP (ref 22–31)
CREAT SERPL-MCNC: 1.46 MG/DL — HIGH (ref 0.5–1.3)
GLUCOSE BLDC GLUCOMTR-MCNC: 118 MG/DL — HIGH (ref 70–99)
GLUCOSE BLDC GLUCOMTR-MCNC: 130 MG/DL — HIGH (ref 70–99)
GLUCOSE BLDC GLUCOMTR-MCNC: 131 MG/DL — HIGH (ref 70–99)
GLUCOSE BLDC GLUCOMTR-MCNC: 156 MG/DL — HIGH (ref 70–99)
GLUCOSE BLDC GLUCOMTR-MCNC: 168 MG/DL — HIGH (ref 70–99)
GLUCOSE SERPL-MCNC: 135 MG/DL — HIGH (ref 70–99)
HCT VFR BLD CALC: 23.7 % — LOW (ref 39–50)
HCT VFR BLD CALC: 24.9 % — LOW (ref 39–50)
HGB BLD-MCNC: 7.2 G/DL — LOW (ref 13–17)
HGB BLD-MCNC: 7.7 G/DL — LOW (ref 13–17)
MAGNESIUM SERPL-MCNC: 2.2 MG/DL — SIGNIFICANT CHANGE UP (ref 1.6–2.6)
MCHC RBC-ENTMCNC: 26.8 PG — LOW (ref 27–34)
MCHC RBC-ENTMCNC: 28.9 GM/DL — LOW (ref 32–36)
MCHC RBC-ENTMCNC: 28.9 PG — SIGNIFICANT CHANGE UP (ref 27–34)
MCHC RBC-ENTMCNC: 32.5 GM/DL — SIGNIFICANT CHANGE UP (ref 32–36)
MCV RBC AUTO: 89 FL — SIGNIFICANT CHANGE UP (ref 80–100)
MCV RBC AUTO: 92.6 FL — SIGNIFICANT CHANGE UP (ref 80–100)
PHOSPHATE SERPL-MCNC: 3 MG/DL — SIGNIFICANT CHANGE UP (ref 2.5–4.5)
PLATELET # BLD AUTO: 160 K/UL — SIGNIFICANT CHANGE UP (ref 150–400)
PLATELET # BLD AUTO: 178 K/UL — SIGNIFICANT CHANGE UP (ref 150–400)
POTASSIUM SERPL-MCNC: 3.9 MMOL/L — SIGNIFICANT CHANGE UP (ref 3.5–5.3)
POTASSIUM SERPL-SCNC: 3.9 MMOL/L — SIGNIFICANT CHANGE UP (ref 3.5–5.3)
RBC # BLD: 2.66 M/UL — LOW (ref 4.2–5.8)
RBC # BLD: 2.69 M/UL — LOW (ref 4.2–5.8)
RBC # FLD: 16.4 % — HIGH (ref 10.3–14.5)
RBC # FLD: 17.1 % — HIGH (ref 10.3–14.5)
SODIUM SERPL-SCNC: 141 MMOL/L — SIGNIFICANT CHANGE UP (ref 135–145)
WBC # BLD: 10.3 K/UL — SIGNIFICANT CHANGE UP (ref 3.8–10.5)
WBC # BLD: 11.6 K/UL — HIGH (ref 3.8–10.5)
WBC # FLD AUTO: 10.3 K/UL — SIGNIFICANT CHANGE UP (ref 3.8–10.5)
WBC # FLD AUTO: 11.6 K/UL — HIGH (ref 3.8–10.5)

## 2019-07-13 RX ORDER — ACETAMINOPHEN 500 MG
1000 TABLET ORAL ONCE
Refills: 0 | Status: COMPLETED | OUTPATIENT
Start: 2019-07-14 | End: 2019-07-14

## 2019-07-13 RX ORDER — ACETAMINOPHEN 500 MG
1000 TABLET ORAL ONCE
Refills: 0 | Status: COMPLETED | OUTPATIENT
Start: 2019-07-13 | End: 2019-07-13

## 2019-07-13 RX ORDER — BENZOCAINE AND MENTHOL 5; 1 G/100ML; G/100ML
1 LIQUID ORAL
Refills: 0 | Status: DISCONTINUED | OUTPATIENT
Start: 2019-07-13 | End: 2019-07-16

## 2019-07-13 RX ORDER — HYDROMORPHONE HYDROCHLORIDE 2 MG/ML
0.5 INJECTION INTRAMUSCULAR; INTRAVENOUS; SUBCUTANEOUS EVERY 4 HOURS
Refills: 0 | Status: DISCONTINUED | OUTPATIENT
Start: 2019-07-13 | End: 2019-07-15

## 2019-07-13 RX ADMIN — Medication 5 MILLIGRAM(S): at 21:24

## 2019-07-13 RX ADMIN — Medication 1000 MILLIGRAM(S): at 08:40

## 2019-07-13 RX ADMIN — SODIUM CHLORIDE 3 MILLILITER(S): 9 INJECTION INTRAMUSCULAR; INTRAVENOUS; SUBCUTANEOUS at 05:44

## 2019-07-13 RX ADMIN — Medication 100 GRAM(S): at 14:09

## 2019-07-13 RX ADMIN — SODIUM CHLORIDE 3 MILLILITER(S): 9 INJECTION INTRAMUSCULAR; INTRAVENOUS; SUBCUTANEOUS at 12:23

## 2019-07-13 RX ADMIN — Medication 1000 MILLIGRAM(S): at 21:49

## 2019-07-13 RX ADMIN — Medication 5 MILLIGRAM(S): at 14:18

## 2019-07-13 RX ADMIN — Medication 400 MILLIGRAM(S): at 14:18

## 2019-07-13 RX ADMIN — SODIUM CHLORIDE 75 MILLILITER(S): 9 INJECTION, SOLUTION INTRAVENOUS at 21:16

## 2019-07-13 RX ADMIN — SODIUM CHLORIDE 3 MILLILITER(S): 9 INJECTION INTRAMUSCULAR; INTRAVENOUS; SUBCUTANEOUS at 21:25

## 2019-07-13 RX ADMIN — Medication 5 MILLIGRAM(S): at 02:35

## 2019-07-13 RX ADMIN — ENOXAPARIN SODIUM 40 MILLIGRAM(S): 100 INJECTION SUBCUTANEOUS at 05:51

## 2019-07-13 RX ADMIN — Medication 1: at 05:50

## 2019-07-13 RX ADMIN — PANTOPRAZOLE SODIUM 40 MILLIGRAM(S): 20 TABLET, DELAYED RELEASE ORAL at 18:18

## 2019-07-13 RX ADMIN — Medication 100 GRAM(S): at 00:40

## 2019-07-13 RX ADMIN — Medication 1000 MILLIGRAM(S): at 15:00

## 2019-07-13 RX ADMIN — Medication 400 MILLIGRAM(S): at 21:15

## 2019-07-13 RX ADMIN — Medication 1000 MILLIGRAM(S): at 00:45

## 2019-07-13 RX ADMIN — Medication 1: at 00:40

## 2019-07-13 RX ADMIN — Medication 400 MILLIGRAM(S): at 08:12

## 2019-07-13 NOTE — PROGRESS NOTE ADULT - SUBJECTIVE AND OBJECTIVE BOX
Patient is a 73y old  Male who presents with a chief complaint of symptomatic anemia (05 Jul 2019 08:54)      HPI:  74 yo  male (no implantable devices) PMH HTN, HLD, TATYANA, CKD III and DMT2 (a1c June 2019, managed by PCP, uncomplicated) presents for cardiac angiogram. Patient reports occasional episodes of moderate chest discomfort associated with dizziness, dyspnea and lightheadedness with moderate exertion x 2 weeks ago. Seen and evaluated by Dr. Mortensen (cards) recommended to have nuclear stress test which was completed on 6/25/19 revealing normal results, normal EF and wall motion, diaphragmatic attenuation. Symptoms still persist- recommends to have LHC for further ischemic evaluation. (02 Jul 2019 09:24)    7/5/19 the p over the last few weeks has been weak dizzy and easily fatigued. He went to see his pmd and the hemoglobin was noted to be in the 8 range where a few months earlier it was 12. He has been under the care of Dr Marc Mai and his hemoglobin was always about 12. He may have been experiencing dark stools over the last few weeks and has not been taking iron pills. The guaiac here was negative for blood. The pt was seen by pulmonary here for interstitial changes in the lungs and will have a ct scan to see if further studies are needed. He was a former . The GFR was 41 and bun was 26 and the white cell count was 6.6 hemoglobin 8.1 and MCV of 91 and MCHC of 29.8 and rdw of 16.5 . The ferritin was only 17 and this suggests iron deficiency anemia and he is to have a EGD and colonoscopy and I will give him iv iron to see if there is a response and he can follow with Dr Mai from heme onc.  7/8 pt receiving iv iron and gi eval in process, hemoglobin was 8.6. 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 The iron continues and will continue to check the cbc for now. 7/10 the pt was feeling well but the hemoglobin is down to 7.5 likely from bleeding. He will likely need transfusion before the surgery. 7/11 surgery scheduled on Friday and case again gone over with [facility] pt and the family at the bedside The hemoglobin was up to 8.3 and no recent transfusion was given. 7/12 pt off to the Or and transfusions as needed to be done there.    In past years the patient has refused bone marrow biopsy and repeat endoscopies.    He appears to have a GIST. Final path pending.    Allergies    No Known Allergies        MEDICATIONS  (STANDING):  acetaminophen  IVPB .. 1000 milliGRAM(s) IV Intermittent once  acetaminophen  IVPB .. 1000 milliGRAM(s) IV Intermittent once  acetaminophen  IVPB .. 1000 milliGRAM(s) IV Intermittent once  cefoTEtan  IVPB 2 Gram(s) IV Intermittent every 12 hours  dextrose 5%. 1000 milliLiter(s) (50 mL/Hr) IV Continuous <Continuous>  dextrose 50% Injectable 12.5 Gram(s) IV Push once  dextrose 50% Injectable 25 Gram(s) IV Push once  dextrose 50% Injectable 25 Gram(s) IV Push once  enoxaparin Injectable 40 milliGRAM(s) SubCutaneous every 24 hours  insulin lispro (HumaLOG) corrective regimen sliding scale   SubCutaneous every 6 hours  lactated ringers. 1000 milliLiter(s) (75 mL/Hr) IV Continuous <Continuous>  metoprolol tartrate Injectable 5 milliGRAM(s) IV Push every 6 hours  pantoprazole  Injectable 40 milliGRAM(s) IV Push daily  sodium chloride 0.9% lock flush 3 milliLiter(s) IV Push every 8 hours    MEDICATIONS  (PRN):  dextrose 40% Gel 15 Gram(s) Oral once PRN Blood Glucose LESS THAN 70 milliGRAM(s)/deciLiter  glucagon  Injectable 1 milliGRAM(s) IntraMuscular once PRN Glucose <70 milliGRAM(s)/deciLiter  HYDROmorphone  Injectable 0.5 milliGRAM(s) IV Push every 4 hours PRN Severe Pain (7 - 10)    PAST MEDICAL & SURGICAL HISTORY:  TATYANA (obstructive sleep apnea)  Mitral regurgitation  Diabetes  HLD (hyperlipidemia)  HTN (hypertension)  Abdominal hernia  Uncontrolled hypertension: BP ranged 170-250/--asymptomatic. Sent from PAST office on 8/12/13 to [facility] ER via ambulance  Snoring: TATYANA precautions--responds affirmatively to STOP BANG questionnaire--admits to loud snoring and daytime somnolence; witnessed apneic events by wife; age &gt; 50; gender, male  Mass: right foot soft tissue mass in August 2013  Tooth decay: surgery for tooth extraction  S/P tonsillectomy          Vital Signs Last 24 Hrs  T(C): 37.1 (13 Jul 2019 06:08), Max: 37.1 (13 Jul 2019 06:08)  T(F): 98.8 (13 Jul 2019 06:08), Max: 98.8 (13 Jul 2019 06:08)  HR: 72 (13 Jul 2019 08:01) (58 - 81)  BP: 108/66 (13 Jul 2019 08:01) (108/66 - 149/71)  BP(mean): 86 (12 Jul 2019 21:00) (86 - 102)  RR: 18 (13 Jul 2019 06:08) (14 - 18)  SpO2: 94% (13 Jul 2019 06:08) (93% - 98%)                                        8.2    8.4   )-----------( 176      ( 12 Jul 2019 18:59 )             24.5       07-12    142  |  103  |  16  ----------------------------<  166<H>  4.3   |  24  |  1.46<H>    Ca    7.9<L>      12 Jul 2019 18:59  Phos  3.4     07-12  Mg     2.0     07-12          ROS:  Post op discomfort.        PHYSICAL EXAM   General: adult in NAD, NGT in place  HEENT: clear oropharynx, anicteric sclera, pink conjunctivae  Neck: supple  CV: normal S1S2 with no murmur rubs or gallops  Lungs: clear to auscultation, no wheezes, no rales  Abdomen: soft non-tender non-distended, no hepatosplenomegaly  Ext: no clubbing cyanosis or edema  Skin: no rashes and no petechiae  Neuro: alert and oriented X3 no focal deficits

## 2019-07-13 NOTE — PROGRESS NOTE ADULT - ASSESSMENT
7/5/19 the p over the last few weeks has been weak dizzy and easily fatigued. He went to see his pmd and the hemoglobin was noted to be in the 8 range where a few months earlier it was 12. He has been under my care  and his hemoglobin was always about 12. I was not contacted with this new finding. He has refused bone marrow biopsies and GI evals in the past. Except for CRF, studies had previously been stable.    He may have been experiencing dark stools over the last few weeks and has not been taking iron pills. The guaiac here was negative for blood. The pt was seen by pulmonary here for interstitial changes in the lungs and will have a ct scan to see if further studies are needed. He was a former . The GFR was 41 and bun was 26 and the white cell count was 6.6 hemoglobin 8.1 and MCV of 91 and MCHC of 29.8 and rdw of 16.5 . The ferritin was only 17 and this suggests iron deficiency anemia and he is to have a EGD and colonoscopy and I will give him iv iron to see if there is a response and he can follow with Dr Mai from heme onc.    7/8 pt receiving iv iron and gi eval in process, hemoglobin was 8.6.    7/9 the pt is stable and the egd showed a large ulcerated mass that was bleeding in the lesser curvature of the stomach and will await the biopsy, It is thought he may have a GIST tumor and this was gone over with the pt and wife and family at the bedside. The iron continues and will continue to check the cbc for now.    7/10 the pt was feeling well but the hemoglobin is down to 7.5 likely from bleeding. He will likely need transfusion before the surgery.      7/11 surgery scheduled on Friday and case again gone over with [facility] pt and the family at the bedside The hemoglobin was up to 8.3 and no recent transfusion was given.     7/12 pt off to the [facility] and transfusions as needed to be done there.

## 2019-07-13 NOTE — PROGRESS NOTE ADULT - ASSESSMENT
73y Male PMH HTN, HLD, TATYANA, CKD III and DMII initially presenting with asymptomatic anemia found to have bleeding ulceration of lesser curvature of the stomach by EGD concerning for GIST. POD#1 partial gastrectomy for GIST tumor. Patient with NGT to low continuous suction, NC due to inability to use CPAP. Recovering appropriately.     - H/H 7.2/24.9   - encourage oob and ambulation  - encourage IC  - NGT to LCS, NPO; do not manipulate NGT  - pain control with tylenol and as needed dilaudid       [facility], .  Mitzi Marshall MD  General Surgery PGY-1

## 2019-07-13 NOTE — PROGRESS NOTE ADULT - SUBJECTIVE AND OBJECTIVE BOX
[facility] Progress Note    SUBJECTIVE: Pt seen and examined at bedside. Reports pain around incision sites and when he coughs. Denies nausea.     MEDICATIONS  (STANDING):  acetaminophen  IVPB .. 1000 milliGRAM(s) IV Intermittent once  acetaminophen  IVPB .. 1000 milliGRAM(s) IV Intermittent once  cefoTEtan  IVPB 2 Gram(s) IV Intermittent every 12 hours  dextrose 5%. 1000 milliLiter(s) (50 mL/Hr) IV Continuous <Continuous>  dextrose 50% Injectable 12.5 Gram(s) IV Push once  dextrose 50% Injectable 25 Gram(s) IV Push once  dextrose 50% Injectable 25 Gram(s) IV Push once  enoxaparin Injectable 40 milliGRAM(s) SubCutaneous every 24 hours  insulin lispro (HumaLOG) corrective regimen sliding scale   SubCutaneous every 6 hours  lactated ringers. 1000 milliLiter(s) (75 mL/Hr) IV Continuous <Continuous>  metoprolol tartrate Injectable 5 milliGRAM(s) IV Push every 6 hours  pantoprazole  Injectable 40 milliGRAM(s) IV Push daily  sodium chloride 0.9% lock flush 3 milliLiter(s) IV Push every 8 hours    MEDICATIONS  (PRN):  dextrose 40% Gel 15 Gram(s) Oral once PRN Blood Glucose LESS THAN 70 milliGRAM(s)/deciLiter  glucagon  Injectable 1 milliGRAM(s) IntraMuscular once PRN Glucose <70 milliGRAM(s)/deciLiter  HYDROmorphone  Injectable 0.5 milliGRAM(s) IV Push every 4 hours PRN Severe Pain (7 - 10)      OBJECTIVE:    Vital Signs Last 24 Hrs  T(C): 37 (13 Jul 2019 13:21), Max: 37.1 (13 Jul 2019 06:08)  T(F): 98.6 (13 Jul 2019 13:21), Max: 98.8 (13 Jul 2019 06:08)  HR: 62 (13 Jul 2019 13:21) (58 - 81)  BP: 115/56 (13 Jul 2019 13:21) (108/66 - 149/71)  BP(mean): 86 (12 Jul 2019 21:00) (86 - 102)  RR: 18 (13 Jul 2019 13:21) (14 - 18)  SpO2: 94% (13 Jul 2019 13:21) (93% - 98%)    General Appearance: NAD, NGT in place and functioning   Neck: Supple  Chest: non-labored breathing, no respiratory distress, nasal cannula as patient cannot wear CPAP with NGT  CV: Pulse regular  Abdomen: Soft, non-tender, non-distended  Extremities: warm and well perfused      LABS:                        7.2    11.60 )-----------( 178      ( 13 Jul 2019 12:24 )             24.9     07-13    141  |  102  |  18  ----------------------------<  135<H>  3.9   |  26  |  1.46<H>    Ca    8.3<L>      13 Jul 2019 08:14  Phos  3.0     07-13  Mg     2.2     07-13

## 2019-07-14 ENCOUNTER — TRANSCRIPTION ENCOUNTER (OUTPATIENT)
Age: 74
End: 2019-07-14

## 2019-07-14 LAB
ANION GAP SERPL CALC-SCNC: 11 MMOL/L — SIGNIFICANT CHANGE UP (ref 5–17)
BUN SERPL-MCNC: 17 MG/DL — SIGNIFICANT CHANGE UP (ref 7–23)
CALCIUM SERPL-MCNC: 8.5 MG/DL — SIGNIFICANT CHANGE UP (ref 8.4–10.5)
CHLORIDE SERPL-SCNC: 105 MMOL/L — SIGNIFICANT CHANGE UP (ref 96–108)
CO2 SERPL-SCNC: 27 MMOL/L — SIGNIFICANT CHANGE UP (ref 22–31)
CREAT SERPL-MCNC: 1.4 MG/DL — HIGH (ref 0.5–1.3)
GLUCOSE BLDC GLUCOMTR-MCNC: 105 MG/DL — HIGH (ref 70–99)
GLUCOSE BLDC GLUCOMTR-MCNC: 109 MG/DL — HIGH (ref 70–99)
GLUCOSE BLDC GLUCOMTR-MCNC: 121 MG/DL — HIGH (ref 70–99)
GLUCOSE SERPL-MCNC: 102 MG/DL — HIGH (ref 70–99)
HCT VFR BLD CALC: 25.4 % — LOW (ref 39–50)
HGB BLD-MCNC: 7.3 G/DL — LOW (ref 13–17)
MAGNESIUM SERPL-MCNC: 2.3 MG/DL — SIGNIFICANT CHANGE UP (ref 1.6–2.6)
MCHC RBC-ENTMCNC: 27.1 PG — SIGNIFICANT CHANGE UP (ref 27–34)
MCHC RBC-ENTMCNC: 28.7 GM/DL — LOW (ref 32–36)
MCV RBC AUTO: 94.4 FL — SIGNIFICANT CHANGE UP (ref 80–100)
PHOSPHATE SERPL-MCNC: 2 MG/DL — LOW (ref 2.5–4.5)
PLATELET # BLD AUTO: 174 K/UL — SIGNIFICANT CHANGE UP (ref 150–400)
POTASSIUM SERPL-MCNC: 3.9 MMOL/L — SIGNIFICANT CHANGE UP (ref 3.5–5.3)
POTASSIUM SERPL-SCNC: 3.9 MMOL/L — SIGNIFICANT CHANGE UP (ref 3.5–5.3)
RBC # BLD: 2.69 M/UL — LOW (ref 4.2–5.8)
RBC # FLD: 16.9 % — HIGH (ref 10.3–14.5)
SODIUM SERPL-SCNC: 143 MMOL/L — SIGNIFICANT CHANGE UP (ref 135–145)
WBC # BLD: 9.38 K/UL — SIGNIFICANT CHANGE UP (ref 3.8–10.5)
WBC # FLD AUTO: 9.38 K/UL — SIGNIFICANT CHANGE UP (ref 3.8–10.5)

## 2019-07-14 RX ORDER — DEXTROSE MONOHYDRATE, SODIUM CHLORIDE, AND POTASSIUM CHLORIDE 50; .745; 4.5 G/1000ML; G/1000ML; G/1000ML
1000 INJECTION, SOLUTION INTRAVENOUS
Refills: 0 | Status: DISCONTINUED | OUTPATIENT
Start: 2019-07-14 | End: 2019-07-15

## 2019-07-14 RX ADMIN — Medication 62.5 MILLIMOLE(S): at 12:43

## 2019-07-14 RX ADMIN — Medication 5 MILLIGRAM(S): at 20:40

## 2019-07-14 RX ADMIN — Medication 400 MILLIGRAM(S): at 01:27

## 2019-07-14 RX ADMIN — SODIUM CHLORIDE 3 MILLILITER(S): 9 INJECTION INTRAMUSCULAR; INTRAVENOUS; SUBCUTANEOUS at 22:47

## 2019-07-14 RX ADMIN — Medication 1000 MILLIGRAM(S): at 01:57

## 2019-07-14 RX ADMIN — ENOXAPARIN SODIUM 40 MILLIGRAM(S): 100 INJECTION SUBCUTANEOUS at 05:01

## 2019-07-14 RX ADMIN — Medication 400 MILLIGRAM(S): at 09:01

## 2019-07-14 RX ADMIN — PANTOPRAZOLE SODIUM 40 MILLIGRAM(S): 20 TABLET, DELAYED RELEASE ORAL at 12:42

## 2019-07-14 RX ADMIN — Medication 1000 MILLIGRAM(S): at 09:30

## 2019-07-14 RX ADMIN — SODIUM CHLORIDE 3 MILLILITER(S): 9 INJECTION INTRAMUSCULAR; INTRAVENOUS; SUBCUTANEOUS at 10:26

## 2019-07-14 RX ADMIN — SODIUM CHLORIDE 3 MILLILITER(S): 9 INJECTION INTRAMUSCULAR; INTRAVENOUS; SUBCUTANEOUS at 06:02

## 2019-07-14 RX ADMIN — Medication 5 MILLIGRAM(S): at 09:01

## 2019-07-14 RX ADMIN — Medication 5 MILLIGRAM(S): at 15:25

## 2019-07-14 NOTE — DISCHARGE NOTE PROVIDER - HOSPITAL COURSE
Patient presented to the for cardiac angiogram for work up of symptomatic anemia.         7/2: Cardiac angiogram cx 2/2 to new onset anemia. patient admitted for anemia w/u so cath deferred. Colonoscopy/EGD negative 7 years ago; had nuclear ST on 6/25 which was negative; orthostatics negative        7/3  CXR w/ increased bilat reticular opacities; stool guaiac negative    	    7/5  CT abd/pelvis w/ divertiuclosis, reticular opacities lower lobes c/w pulm fibrosis        7/8  EGD w/ large gastric submucosal mass (GIST), clipping attempted but unsuccessful, hemospray, if bleed, will need repeat EGD or IR embolization; colonoscopy w/ diverticulosis, poor prep, no large mass, sm non bleeding internal hemorrhoids        7/12: OR for gastrectomy        7/15: NGT removed after surgery Patient presented to the for cardiac angiogram for work up of symptomatic anemia.         7/2: Cardiac angiogram cx 2/2 to new onset anemia. patient admitted for anemia w/u so cath deferred. Colonoscopy/EGD negative 7 years ago; had nuclear ST on 6/25 which was negative; orthostatics negative        7/3  CXR w/ increased bilat reticular opacities; stool guaiac negative    	    7/5  CT abd/pelvis w/ divertiuclosis, reticular opacities lower lobes c/w pulm fibrosis        7/8  EGD w/ large gastric submucosal mass (GIST), clipping attempted but unsuccessful, hemospray, if bleed, will need repeat EGD or IR embolization; colonoscopy w/ diverticulosis, poor prep, no large mass, sm non bleeding internal hemorrhoids        7/12: OR for gastrectomy        7/15: NGT removed after surgery        7/16: On day of discharge, patient was tolerating a regular diet, walking and had pain control        He will follow up with Dr. Stewart in 1-2 weeks. He will also follow up with pulmonology and hematology as well as his PMD in 1-2 weeks.

## 2019-07-14 NOTE — DISCHARGE NOTE PROVIDER - NSDCCPCAREPLAN_GEN_ALL_CORE_FT
PRINCIPAL DISCHARGE DIAGNOSIS  Diagnosis: Malignant gastrointestinal stromal tumor (GIST) of stomach  Assessment and Plan of Treatment: Malignant gastrointestinal stromal tumor (GIST) of stomach      SECONDARY DISCHARGE DIAGNOSES  Diagnosis: Anemia  Assessment and Plan of Treatment: Anemia

## 2019-07-14 NOTE — PROGRESS NOTE ADULT - SUBJECTIVE AND OBJECTIVE BOX
[facility] Progress Note    SUBJECTIVE: POD#2 partial gastrectomy. Patient reports feeling much better this morning. He is walking, using the incentive spirometer and urinating well. He reports some mild abdominal pain around the incisions. NGT in place, nasal cannula in place. Denies nausea. No flatus or BM yet.     MEDICATIONS  (STANDING):  dextrose 5%. 1000 milliLiter(s) (50 mL/Hr) IV Continuous <Continuous>  dextrose 50% Injectable 12.5 Gram(s) IV Push once  dextrose 50% Injectable 25 Gram(s) IV Push once  dextrose 50% Injectable 25 Gram(s) IV Push once  enoxaparin Injectable 40 milliGRAM(s) SubCutaneous every 24 hours  insulin lispro (HumaLOG) corrective regimen sliding scale   SubCutaneous every 6 hours  lactated ringers. 1000 milliLiter(s) (75 mL/Hr) IV Continuous <Continuous>  metoprolol tartrate Injectable 5 milliGRAM(s) IV Push every 6 hours  pantoprazole  Injectable 40 milliGRAM(s) IV Push daily  sodium chloride 0.9% lock flush 3 milliLiter(s) IV Push every 8 hours  sodium phosphate IVPB 15 milliMole(s) IV Intermittent once    MEDICATIONS  (PRN):  benzocaine 15 mG/menthol 3.6 mG Lozenge 1 Lozenge Oral four times a day PRN Sore Throat  dextrose 40% Gel 15 Gram(s) Oral once PRN Blood Glucose LESS THAN 70 milliGRAM(s)/deciLiter  glucagon  Injectable 1 milliGRAM(s) IntraMuscular once PRN Glucose <70 milliGRAM(s)/deciLiter  HYDROmorphone  Injectable 0.5 milliGRAM(s) IV Push every 4 hours PRN Severe Pain (7 - 10)    OBJECTIVE:    Vital Signs Last 24 Hrs  T(C): 37.2 (14 Jul 2019 09:05), Max: 37.5 (13 Jul 2019 17:01)  T(F): 99 (14 Jul 2019 09:05), Max: 99.5 (13 Jul 2019 17:01)  HR: 71 (14 Jul 2019 09:05) (60 - 71)  BP: 152/74 (14 Jul 2019 09:05) (115/56 - 152/74)  BP(mean): --  RR: 18 (14 Jul 2019 09:05) (17 - 18)  SpO2: 93% (14 Jul 2019 09:05) (93% - 95%)    PHYSICAL EXAM  General Appearance: NAD, NGT in place and functioning,  Neck: Supple  Chest: non-labored breathing, no respiratory distress, nasal cannula 2L as patient cannot wear CPAP with NGT  CV: Pulse regular  Abdomen: Soft, distended, appropriate tenderness at incisions, incisions clean, dry with steri strips   Extremities: warm and well perfused      LABS:                          7.7    10.3  )-----------( 160      ( 13 Jul 2019 14:43 )             23.7     07-14    143  |  105  |  17  ----------------------------<  102<H>  3.9   |  27  |  1.40<H>    Ca    8.5      14 Jul 2019 06:45  Phos  2.0     07-14  Mg     2.3     07-14

## 2019-07-14 NOTE — DISCHARGE NOTE PROVIDER - PROVIDER TOKENS
PROVIDER:[TOKEN:[[id number]]] PROVIDER:[TOKEN:[[id number]]],PROVIDER:[TOKEN:[[id number]]],PROVIDER:[TOKEN:[[id number]]]

## 2019-07-14 NOTE — PROGRESS NOTE ADULT - SUBJECTIVE AND OBJECTIVE BOX
Patient is a 73y old  Male who presents with a chief complaint of symptomatic anemia (05 Jul 2019 08:54)      HPI:  74 yo  male (no implantable devices) PMH HTN, HLD, TATYANA, CKD III and DMT2 (a1c June 2019, managed by PCP, uncomplicated) presents for cardiac angiogram. Patient reports occasional episodes of moderate chest discomfort associated with dizziness, dyspnea and lightheadedness with moderate exertion x 2 weeks ago. Seen and evaluated by Dr. Mortensen (cards) recommended to have nuclear stress test which was completed on 6/25/19 revealing normal results, normal EF and wall motion, diaphragmatic attenuation. Symptoms still persist- recommends to have LHC for further ischemic evaluation. (02 Jul 2019 09:24)    7/5/19 the p over the last few weeks has been weak dizzy and easily fatigued. He went to see his pmd and the hemoglobin was noted to be in the 8 range where a few months earlier it was 12. He has been under the care of Dr Marc Mai and his hemoglobin was always about 12. He may have been experiencing dark stools over the last few weeks and has not been taking iron pills. The guaiac here was negative for blood. The pt was seen by pulmonary here for interstitial changes in the lungs and will have a ct scan to see if further studies are needed. He was a former . The GFR was 41 and bun was 26 and the white cell count was 6.6 hemoglobin 8.1 and MCV of 91 and MCHC of 29.8 and rdw of 16.5 . The ferritin was only 17 and this suggests iron deficiency anemia and he is to have a EGD and colonoscopy and I will give him iv iron to see if there is a response and he can follow with Dr Mai from heme onc.  7/8 pt receiving iv iron and gi eval in process, hemoglobin was 8.6. 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 The iron continues and will continue to check the cbc for now. 7/10 the pt was feeling well but the hemoglobin is down to 7.5 likely from bleeding. He will likely need transfusion before the surgery. 7/11 surgery scheduled on Friday and case again gone over with [facility] pt and the family at the bedside The hemoglobin was up to 8.3 and no recent transfusion was given. 7/12 pt off to the Or and transfusions as needed to be done there.    In past years the patient has refused bone marrow biopsy and repeat endoscopies.    He appears to have a GIST. Final path pending.      Allergies  No Known Allergies        MEDICATIONS  (STANDING):  acetaminophen  IVPB .. 1000 milliGRAM(s) IV Intermittent once  dextrose 5%. 1000 milliLiter(s) (50 mL/Hr) IV Continuous <Continuous>  dextrose 50% Injectable 12.5 Gram(s) IV Push once  dextrose 50% Injectable 25 Gram(s) IV Push once  dextrose 50% Injectable 25 Gram(s) IV Push once  enoxaparin Injectable 40 milliGRAM(s) SubCutaneous every 24 hours  insulin lispro (HumaLOG) corrective regimen sliding scale   SubCutaneous every 6 hours  lactated ringers. 1000 milliLiter(s) (75 mL/Hr) IV Continuous <Continuous>  metoprolol tartrate Injectable 5 milliGRAM(s) IV Push every 6 hours  pantoprazole  Injectable 40 milliGRAM(s) IV Push daily  sodium chloride 0.9% lock flush 3 milliLiter(s) IV Push every 8 hours    MEDICATIONS  (PRN):  benzocaine 15 mG/menthol 3.6 mG Lozenge 1 Lozenge Oral four times a day PRN Sore Throat  dextrose 40% Gel 15 Gram(s) Oral once PRN Blood Glucose LESS THAN 70 milliGRAM(s)/deciLiter  glucagon  Injectable 1 milliGRAM(s) IntraMuscular once PRN Glucose <70 milliGRAM(s)/deciLiter  HYDROmorphone  Injectable 0.5 milliGRAM(s) IV Push every 4 hours PRN Severe Pain (7 - 10)      PAST MEDICAL & SURGICAL HISTORY:  TATYANA (obstructive sleep apnea)  Mitral regurgitation  Diabetes  HLD (hyperlipidemia)  HTN (hypertension)  Abdominal hernia  Uncontrolled hypertension: BP ranged 170-250/--asymptomatic. Sent from [facility] office on 8/12/13 to [facility] ER via ambulance  Snoring: TATYANA precautions--responds affirmatively to STOP BANG questionnaire--admits to loud snoring and daytime somnolence; witnessed apneic events by wife; age &gt; 50; gender, male  Mass: right foot soft tissue mass in August 2013  Tooth decay: surgery for tooth extraction  S/P tonsillectomy          Vital Signs Last 24 Hrs  T(C): 36.7 (14 Jul 2019 04:21), Max: 37.5 (13 Jul 2019 17:01)  T(F): 98 (14 Jul 2019 04:21), Max: 99.5 (13 Jul 2019 17:01)  HR: 62 (14 Jul 2019 06:18) (60 - 72)  BP: 136/68 (14 Jul 2019 04:21) (108/66 - 136/68)  BP(mean): --  RR: 18 (14 Jul 2019 04:21) (17 - 18)  SpO2: 95% (14 Jul 2019 06:18) (93% - 95%)                                        8.2    8.4   )-----------( 176      ( 12 Jul 2019 18:59 )             24.5                                 7.7    10.3  )-----------( 160      ( 13 Jul 2019 14:43 )             23.7       07-13    141  |  102  |  18  ----------------------------<  135<H>  3.9   |  26  |  1.46<H>    Ca    8.3<L>      13 Jul 2019 08:14  Phos  3.0     07-13  Mg     2.2     07-13              ROS:  Feeling overall better.  + gas        PHYSICAL EXAM   General: adult in NAD, NGT in place. Appears much more comfortable.  HEENT: clear oropharynx, anicteric sclera, pink conjunctivae  Neck: supple  CV: normal S1S2 with no murmur rubs or gallops  Lungs: clear to auscultation, no wheezes, no rales  Abdomen: soft non-tender non-distended, no hepatosplenomegaly  Ext: no clubbing cyanosis or edema  Skin: no rashes and no petechiae  Neuro: alert and oriented X3 no focal deficits

## 2019-07-14 NOTE — DISCHARGE NOTE PROVIDER - NSDCCPTREATMENT_GEN_ALL_CORE_FT
PRINCIPAL PROCEDURE  Procedure: Laparoscopic partial gastrectomy  Findings and Treatment:       SECONDARY PROCEDURE  Procedure: UGI endoscopy  Findings and Treatment:

## 2019-07-14 NOTE — DISCHARGE NOTE PROVIDER - CARE PROVIDERS DIRECT ADDRESSES
,DirectAddress_Unknown ,DirectAddress_Unknown,DirectAddress_Unknown,[email or web address]

## 2019-07-14 NOTE — DISCHARGE NOTE PROVIDER - CARE PROVIDER_API CALL
Fermin Stewart (MD)  Surgery  [address] [place] [address]  Phone: [phone]  Fax: [phone]  Follow Up Time: Fermin Stewart)  Surgery  [address] [place] [address]  Phone: [phone]  Fax: [phone]  Follow Up Time:     Robert Hui)  Internal Medicine; Medical Oncology  [address] [place] [address]  Phone: [phone]  Fax: [phone]  Follow Up Time:     Dara Conde)  Critical Care Medicine; Pulmonary Disease  [address] [facility], [address]  [place] [address]  Phone: [phone]  Fax: [phone]  Follow Up Time:

## 2019-07-14 NOTE — PROGRESS NOTE ADULT - ASSESSMENT
73y Male PMH HTN, HLD, TATYANA, CKD III and DMII initially presenting with asymptomatic anemia found to have bleeding ulceration of lesser curvature of the stomach by EGD concerning for GIST. POD#2 partial gastrectomy for GIST tumor. Patient with NGT to low continuous suction, NC due to inability to use CPAP. Recovering appropriately. Low, but stable H/H yesterday.     - monitor H/H today  - dressing removed at bedside, steri strips in place  - encourage oob and ambulation  - encourage IC  - NGT to LCS, NPO; do not manipulate NGT  - continue   - pain control with tylenol and as needed dilaudid       [facility], .  Mitzi Marshall MD  General Surgery PGY-1

## 2019-07-14 NOTE — PROGRESS NOTE ADULT - ASSESSMENT
7/5/19 the p over the last few weeks has been weak dizzy and easily fatigued. He went to see his pmd and the hemoglobin was noted to be in the 8 range where a few months earlier it was 12. He has been under my care  and his hemoglobin was always about 12. I was not contacted with this new finding. He has refused bone marrow biopsies and GI evals in the past. Except for CRF, studies had previously been stable.    He may have been experiencing dark stools over the last few weeks and has not been taking iron pills. The guaiac here was negative for blood. The pt was seen by pulmonary here for interstitial changes in the lungs and will have a ct scan to see if further studies are needed. He was a former . The GFR was 41 and bun was 26 and the white cell count was 6.6 hemoglobin 8.1 and MCV of 91 and MCHC of 29.8 and rdw of 16.5 . The ferritin was only 17 and this suggests iron deficiency anemia and he is to have a EGD and colonoscopy and I will give him iv iron to see if there is a response and he can follow with Dr Mai from heme onc.    7/8 pt receiving iv iron and gi eval in process, hemoglobin was 8.6.    7/9 the pt is stable and the egd showed a large ulcerated mass that was bleeding in the lesser curvature of the stomach and will await the biopsy, It is thought he may have a GIST tumor and this was gone over with the pt and wife and family at the bedside. The iron continues and will continue to check the cbc for now.    7/10 the pt was feeling well but the hemoglobin is down to 7.5 likely from bleeding. He will likely need transfusion before the surgery.      7/11 surgery scheduled on Friday and case again gone over with [facility] pt and the family at the bedside The hemoglobin was up to 8.3 and no recent transfusion was given.     7/12 pt off to the [facility] and transfusions as needed to be done there.    Post op. Feeling better. Final path pending.

## 2019-07-15 LAB
ANION GAP SERPL CALC-SCNC: 11 MMOL/L — SIGNIFICANT CHANGE UP (ref 5–17)
BUN SERPL-MCNC: 14 MG/DL — SIGNIFICANT CHANGE UP (ref 7–23)
CALCIUM SERPL-MCNC: 8.5 MG/DL — SIGNIFICANT CHANGE UP (ref 8.4–10.5)
CHLORIDE SERPL-SCNC: 104 MMOL/L — SIGNIFICANT CHANGE UP (ref 96–108)
CO2 SERPL-SCNC: 27 MMOL/L — SIGNIFICANT CHANGE UP (ref 22–31)
CREAT SERPL-MCNC: 1.24 MG/DL — SIGNIFICANT CHANGE UP (ref 0.5–1.3)
GLUCOSE BLDC GLUCOMTR-MCNC: 109 MG/DL — HIGH (ref 70–99)
GLUCOSE BLDC GLUCOMTR-MCNC: 110 MG/DL — HIGH (ref 70–99)
GLUCOSE BLDC GLUCOMTR-MCNC: 117 MG/DL — HIGH (ref 70–99)
GLUCOSE BLDC GLUCOMTR-MCNC: 123 MG/DL — HIGH (ref 70–99)
GLUCOSE BLDC GLUCOMTR-MCNC: 133 MG/DL — HIGH (ref 70–99)
GLUCOSE SERPL-MCNC: 113 MG/DL — HIGH (ref 70–99)
HCT VFR BLD CALC: 25.8 % — LOW (ref 39–50)
HGB BLD-MCNC: 7.8 G/DL — LOW (ref 13–17)
MAGNESIUM SERPL-MCNC: 2.2 MG/DL — SIGNIFICANT CHANGE UP (ref 1.6–2.6)
MCHC RBC-ENTMCNC: 27.6 PG — SIGNIFICANT CHANGE UP (ref 27–34)
MCHC RBC-ENTMCNC: 30.2 GM/DL — LOW (ref 32–36)
MCV RBC AUTO: 91.2 FL — SIGNIFICANT CHANGE UP (ref 80–100)
PHOSPHATE SERPL-MCNC: 2 MG/DL — LOW (ref 2.5–4.5)
PLATELET # BLD AUTO: 197 K/UL — SIGNIFICANT CHANGE UP (ref 150–400)
POTASSIUM SERPL-MCNC: 4.1 MMOL/L — SIGNIFICANT CHANGE UP (ref 3.5–5.3)
POTASSIUM SERPL-SCNC: 4.1 MMOL/L — SIGNIFICANT CHANGE UP (ref 3.5–5.3)
RBC # BLD: 2.83 M/UL — LOW (ref 4.2–5.8)
RBC # FLD: 16.1 % — HIGH (ref 10.3–14.5)
SODIUM SERPL-SCNC: 142 MMOL/L — SIGNIFICANT CHANGE UP (ref 135–145)
WBC # BLD: 8.33 K/UL — SIGNIFICANT CHANGE UP (ref 3.8–10.5)
WBC # FLD AUTO: 8.33 K/UL — SIGNIFICANT CHANGE UP (ref 3.8–10.5)

## 2019-07-15 RX ORDER — AMLODIPINE BESYLATE 2.5 MG/1
10 TABLET ORAL DAILY
Refills: 0 | Status: DISCONTINUED | OUTPATIENT
Start: 2019-07-15 | End: 2019-07-16

## 2019-07-15 RX ORDER — INSULIN LISPRO 100/ML
VIAL (ML) SUBCUTANEOUS
Refills: 0 | Status: DISCONTINUED | OUTPATIENT
Start: 2019-07-15 | End: 2019-07-16

## 2019-07-15 RX ORDER — PANTOPRAZOLE SODIUM 20 MG/1
40 TABLET, DELAYED RELEASE ORAL
Refills: 0 | Status: DISCONTINUED | OUTPATIENT
Start: 2019-07-15 | End: 2019-07-16

## 2019-07-15 RX ORDER — DEXTROSE MONOHYDRATE, SODIUM CHLORIDE, AND POTASSIUM CHLORIDE 50; .745; 4.5 G/1000ML; G/1000ML; G/1000ML
1000 INJECTION, SOLUTION INTRAVENOUS
Refills: 0 | Status: DISCONTINUED | OUTPATIENT
Start: 2019-07-15 | End: 2019-07-15

## 2019-07-15 RX ORDER — OXYCODONE HYDROCHLORIDE 5 MG/1
5 TABLET ORAL EVERY 6 HOURS
Refills: 0 | Status: DISCONTINUED | OUTPATIENT
Start: 2019-07-15 | End: 2019-07-16

## 2019-07-15 RX ORDER — HYDROCHLOROTHIAZIDE 25 MG
25 TABLET ORAL DAILY
Refills: 0 | Status: DISCONTINUED | OUTPATIENT
Start: 2019-07-15 | End: 2019-07-16

## 2019-07-15 RX ORDER — LABETALOL HCL 100 MG
400 TABLET ORAL DAILY
Refills: 0 | Status: DISCONTINUED | OUTPATIENT
Start: 2019-07-15 | End: 2019-07-16

## 2019-07-15 RX ORDER — INSULIN LISPRO 100/ML
VIAL (ML) SUBCUTANEOUS AT BEDTIME
Refills: 0 | Status: DISCONTINUED | OUTPATIENT
Start: 2019-07-15 | End: 2019-07-16

## 2019-07-15 RX ORDER — ACETAMINOPHEN 500 MG
650 TABLET ORAL EVERY 6 HOURS
Refills: 0 | Status: DISCONTINUED | OUTPATIENT
Start: 2019-07-15 | End: 2019-07-16

## 2019-07-15 RX ORDER — LOSARTAN POTASSIUM 100 MG/1
50 TABLET, FILM COATED ORAL DAILY
Refills: 0 | Status: DISCONTINUED | OUTPATIENT
Start: 2019-07-15 | End: 2019-07-16

## 2019-07-15 RX ADMIN — Medication 5 MILLIGRAM(S): at 10:57

## 2019-07-15 RX ADMIN — Medication 85 MILLIMOLE(S): at 11:02

## 2019-07-15 RX ADMIN — SODIUM CHLORIDE 3 MILLILITER(S): 9 INJECTION INTRAMUSCULAR; INTRAVENOUS; SUBCUTANEOUS at 12:37

## 2019-07-15 RX ADMIN — ENOXAPARIN SODIUM 40 MILLIGRAM(S): 100 INJECTION SUBCUTANEOUS at 05:27

## 2019-07-15 RX ADMIN — DEXTROSE MONOHYDRATE, SODIUM CHLORIDE, AND POTASSIUM CHLORIDE 100 MILLILITER(S): 50; .745; 4.5 INJECTION, SOLUTION INTRAVENOUS at 05:25

## 2019-07-15 RX ADMIN — SODIUM CHLORIDE 3 MILLILITER(S): 9 INJECTION INTRAMUSCULAR; INTRAVENOUS; SUBCUTANEOUS at 21:17

## 2019-07-15 RX ADMIN — SODIUM CHLORIDE 3 MILLILITER(S): 9 INJECTION INTRAMUSCULAR; INTRAVENOUS; SUBCUTANEOUS at 05:27

## 2019-07-15 RX ADMIN — DEXTROSE MONOHYDRATE, SODIUM CHLORIDE, AND POTASSIUM CHLORIDE 90 MILLILITER(S): 50; .745; 4.5 INJECTION, SOLUTION INTRAVENOUS at 00:47

## 2019-07-15 NOTE — PROGRESS NOTE ADULT - ASSESSMENT
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 Autoimmune workup negative including ELOISA, SSA/SSB, p-ANCA, c-ANCA, anti-smith, anti-RNP, scleroderma antibodies. Pt doing well without respiratory symptoms.     # ILD: likely from previous occupational injury, clinically stable  - Check RF, anti-CCP, and ESR as part of ILD workup  - Continue CPAP at night for TATYANA  - Patient can follow up with us outpatient at [address] [place] [address], [address]. Number [phone]  Appointments can also be made by e-mailing [email or web address] and providing patient's name, , and discharge diagnosis

## 2019-07-15 NOTE — PROGRESS NOTE ADULT - SUBJECTIVE AND OBJECTIVE BOX
CHIEF COMPLAINT: ILD    Interval Events: Pt postop day 3 for patial gastrectomy of GIST tumor. No fever, cough, SOB or CP. Not requiring O2.     REVIEW OF SYSTEMS:  Constitutional: [ x ] negative [ ] fevers [ ] chills [ ] weight loss [ ] weight gain  HEENT: [ x ] negative [ ] dry eyes [ ] eye irritation [ ] postnasal drip [ ] nasal congestion  CV: [ x ] negative  [ ] chest pain [ ] orthopnea [ ] palpitations [ ] murmur  Resp: [ x ] negative [ ] cough [ ] shortness of breath [ ] dyspnea [ ] wheezing [ ] sputum [ ] hemoptysis  GI: [ x ] negative [ ] nausea [ ] vomiting [ ] diarrhea [ ] constipation [ ] abd pain [ ] dysphagia   : [ x ] negative [ ] dysuria [ ] nocturia [ ] hematuria [ ] increased urinary frequency  Musculoskeletal: [ x ] negative [ ] back pain [ ] myalgias [ ] arthralgias [ ] fracture  Skin: [ x ] negative [ ] rash [ ] itch  Neurological: [ x ] negative [ ] headache [ ] dizziness [ ] syncope [ ] weakness [ ] numbness  Psychiatric: [ x ] negative [ ] anxiety [ ] depression  Endocrine: [ x ] negative [ ] diabetes [ ] thyroid problem  Hematologic/Lymphatic: [ x ] negative [ ] anemia [ ] bleeding problem  Allergic/Immunologic: [ x ] negative [ ] itchy eyes [ ] nasal discharge [ ] hives [ ] angioedema  [ x ] All other systems negative  [ ] Unable to assess ROS because ________    OBJECTIVE:  ICU Vital Signs Last 24 Hrs  T(C): 36.6 (15 Jul 2019 10:14), Max: 37.2 (14 Jul 2019 20:38)  T(F): 97.9 (15 Jul 2019 10:14), Max: 99 (14 Jul 2019 20:38)  HR: 85 (15 Jul 2019 10:14) (59 - 85)  BP: 151/74 (15 Jul 2019 10:14) (130/70 - 160/70)  BP(mean): --  ABP: --  ABP(mean): --  RR: 19 (15 Jul 2019 10:14) (18 - 20)  SpO2: 95% (15 Jul 2019 10:14) (92% - 97%)        07-14 @ 07:01  -  07-15 @ 07:00  --------------------------------------------------------  IN: 1250 mL / OUT: 2850 mL / NET: -1600 mL    07-15 @ 07:01  -  07-15 @ 10:52  --------------------------------------------------------  IN: 280 mL / OUT: 275 mL / NET: 5 mL      CAPILLARY BLOOD GLUCOSE      POCT Blood Glucose.: 110 mg/dL (15 Jul 2019 06:32)      PHYSICAL EXAM:  General: NAD  HEENT: EOMI, normal oropharyn  Neck: full ROM, no JVD  Respiratory: CtA b/l, no wheezes, rales, or rhonchi  Cardiovascular: RRR, no murmurs  Abdomen: soft, NTND  Extremities: No clubbing, cyanosis, or edema  Skin: no rashes or skin lesion  Neurological: AAOx3, nonfocal       HOSPITAL MEDICATIONS:  MEDICATIONS  (STANDING):  dextrose 5% + sodium chloride 0.45% with potassium chloride 20 mEq/L 1000 milliLiter(s) (75 mL/Hr) IV Continuous <Continuous>  dextrose 5%. 1000 milliLiter(s) (50 mL/Hr) IV Continuous <Continuous>  dextrose 50% Injectable 12.5 Gram(s) IV Push once  dextrose 50% Injectable 25 Gram(s) IV Push once  dextrose 50% Injectable 25 Gram(s) IV Push once  enoxaparin Injectable 40 milliGRAM(s) SubCutaneous every 24 hours  insulin lispro (HumaLOG) corrective regimen sliding scale   SubCutaneous three times a day before meals  insulin lispro (HumaLOG) corrective regimen sliding scale   SubCutaneous at bedtime  metoprolol tartrate Injectable 5 milliGRAM(s) IV Push every 6 hours  pantoprazole  Injectable 40 milliGRAM(s) IV Push daily  sodium chloride 0.9% lock flush 3 milliLiter(s) IV Push every 8 hours  sodium phosphate IVPB 30 milliMole(s) IV Intermittent once    MEDICATIONS  (PRN):  benzocaine 15 mG/menthol 3.6 mG Lozenge 1 Lozenge Oral four times a day PRN Sore Throat  dextrose 40% Gel 15 Gram(s) Oral once PRN Blood Glucose LESS THAN 70 milliGRAM(s)/deciLiter  glucagon  Injectable 1 milliGRAM(s) IntraMuscular once PRN Glucose <70 milliGRAM(s)/deciLiter  HYDROmorphone  Injectable 0.5 milliGRAM(s) IV Push every 4 hours PRN Severe Pain (7 - 10)      LABS:                        7.8    8.33  )-----------( 197      ( 15 Jul 2019 08:37 )             25.8     Hgb Trend: 7.8<--, 7.3<--, 7.7<--, 7.2<--, 8.2<--  07-15    142  |  104  |  14  ----------------------------<  113<H>  4.1   |  27  |  1.24    Ca    8.5      15 Jul 2019 06:32  Phos  2.0     07-15  Mg     2.2     07-15      Creatinine Trend: 1.24<--, 1.40<--, 1.46<--, 1.46<--, 1.49<--, 1.56<--        MICROBIOLOGY:       RADIOLOGY:  < from: CT Chest w/ IV Cont (07.10.19 @ 17:56) >  EXAM:  CT CHEST IC                          EXAM:  CT ABDOMEN AND PELVIS OC IC                            PROCEDURE DATE:  07/10/2019            INTERPRETATION:  CLINICAL INFORMATION: New gastric tumor found in recent   imaging. Staging. Dizziness,shoulder and chest pain.    COMPARISON: CT abdomen and pelvis 7/5/2019. CT chest 7/3/2019.    PROCEDURE:   CT of the Chest, Abdomen and Pelvis was performed with intravenous   contrast.   Intravenous contrast: 90 ml Omnipaque 350. 10 ml discarded.  Oral contrast: None.  Sagittal and coronal reformats were performed.    FINDINGS:    CHEST:     LUNGS AND LARGE AIRWAYS: Patent central airways. Multiple scattered   bilateral small parenchymal calcifications. Bilateral lower lobe   predominant peripheral reticular opacities with minimal traction   bronchiectasis, unchanged.  PLEURA: No pleural effusion.  VESSELS: Within normal limits.  HEART: Heart size is normal. No pericardial effusion. Coronary artery   calcifications.  MEDIASTINUM AND SONA: Nolymphadenopathy.  CHEST WALL AND LOWER NECK: Within normal limits.    ABDOMEN AND PELVIS:    LIVER: Within normal limits.  BILE DUCTS: Normal caliber.  GALLBLADDER: Within normal limits.  SPLEEN: Within normal limits.  PANCREAS: Within normal limits.  ADRENALS: Within normal limits.  KIDNEYS/URETERS: Within normal limits.    BLADDER: Within normal limits.  REPRODUCTIVE ORGANS: Prostate within normal limits.    BOWEL: A 3.2 x 5.0 cm gastric cardia/fundal lesser curvature mass with   ulceration/fistulization to the gastric lumen. No bowel obstruction.   Appendix is normal. Colonic diverticulosis without diverticulitis.  PERITONEUM: No ascites.  VESSELS:  Mild atherosclerotic changes.  RETROPERITONEUM: No lymphadenopathy.    ABDOMINAL WALL: Tinyfat-containing umbilical hernia.  BONES: Degenerative changes of the spine.    IMPRESSION:     A 3.2 x 5.0 cm ulcerating gastric cardia/fundal mass.    No evidence of metastatic disease in the chest, abdomen and pelvis        TEAGAN FERNANDEZ M.D., RADIOLOGY RESIDENT  This document has been electronically signed.  JANELLE SAUCEDO M.D., ATTENDING RADIOLOGIST  This document has been electronically signed. Jul 11 2019 11:19AM    < end of copied text >          EKG: CHIEF COMPLAINT: ILD    Interval Events: Pt postop day 3 for patial gastrectomy of GIST tumor. No fever, cough, SOB or CP. Not requiring O2.     REVIEW OF SYSTEMS:  Constitutional: [ x ] negative [ ] fevers [ ] chills [ ] weight loss [ ] weight gain  HEENT: [ x ] negative [ ] dry eyes [ ] eye irritation [ ] postnasal drip [ ] nasal congestion  CV: [ x ] negative  [ ] chest pain [ ] orthopnea [ ] palpitations [ ] murmur  Resp: [ x ] negative [ ] cough [ ] shortness of breath [ ] dyspnea [ ] wheezing [ ] sputum [ ] hemoptysis  GI: [ x ] negative [ ] nausea [ ] vomiting [ ] diarrhea [ ] constipation [ ] abd pain [ ] dysphagia   : [ x ] negative [ ] dysuria [ ] nocturia [ ] hematuria [ ] increased urinary frequency  Musculoskeletal: [ x ] negative [ ] back pain [ ] myalgias [ ] arthralgias [ ] fracture  Skin: [ x ] negative [ ] rash [ ] itch  Neurological: [ x ] negative [ ] headache [ ] dizziness [ ] syncope [ ] weakness [ ] numbness  Psychiatric: [ x ] negative [ ] anxiety [ ] depression  Endocrine: [ x ] negative [ ] diabetes [ ] thyroid problem  Hematologic/Lymphatic: [ x ] negative [ ] anemia [ ] bleeding problem  Allergic/Immunologic: [ x ] negative [ ] itchy eyes [ ] nasal discharge [ ] hives [ ] angioedema  [ x ] All other systems negative  [ ] Unable to assess ROS because ________    OBJECTIVE:  ICU Vital Signs Last 24 Hrs  T(C): 36.6 (15 Jul 2019 10:14), Max: 37.2 (14 Jul 2019 20:38)  T(F): 97.9 (15 Jul 2019 10:14), Max: 99 (14 Jul 2019 20:38)  HR: 85 (15 Jul 2019 10:14) (59 - 85)  BP: 151/74 (15 Jul 2019 10:14) (130/70 - 160/70)  BP(mean): --  ABP: --  ABP(mean): --  RR: 19 (15 Jul 2019 10:14) (18 - 20)  SpO2: 95% (15 Jul 2019 10:14) (92% - 97%)        07-14 @ 07:01  -  07-15 @ 07:00  --------------------------------------------------------  IN: 1250 mL / OUT: 2850 mL / NET: -1600 mL    07-15 @ 07:01  -  07-15 @ 10:52  --------------------------------------------------------  IN: 280 mL / OUT: 275 mL / NET: 5 mL      CAPILLARY BLOOD GLUCOSE      POCT Blood Glucose.: 110 mg/dL (15 Jul 2019 06:32)      PHYSICAL EXAM:  General: NAD  HEENT: EOMI, normal oropharyn  Neck: full ROM, no JVD  Respiratory: CtA b/l, no wheezes, rales, or rhonchi  Cardiovascular: RRR, no murmurs  Abdomen: soft, NTND  Extremities: No clubbing, cyanosis, or edema  Skin: no rashes or skin lesion  Neurological: AAOx3, nonfocal       HOSPITAL MEDICATIONS:  MEDICATIONS  (STANDING):  dextrose 5% + sodium chloride 0.45% with potassium chloride 20 mEq/L 1000 milliLiter(s) (75 mL/Hr) IV Continuous <Continuous>  dextrose 5%. 1000 milliLiter(s) (50 mL/Hr) IV Continuous <Continuous>  dextrose 50% Injectable 12.5 Gram(s) IV Push once  dextrose 50% Injectable 25 Gram(s) IV Push once  dextrose 50% Injectable 25 Gram(s) IV Push once  enoxaparin Injectable 40 milliGRAM(s) SubCutaneous every 24 hours  insulin lispro (HumaLOG) corrective regimen sliding scale   SubCutaneous three times a day before meals  insulin lispro (HumaLOG) corrective regimen sliding scale   SubCutaneous at bedtime  metoprolol tartrate Injectable 5 milliGRAM(s) IV Push every 6 hours  pantoprazole  Injectable 40 milliGRAM(s) IV Push daily  sodium chloride 0.9% lock flush 3 milliLiter(s) IV Push every 8 hours  sodium phosphate IVPB 30 milliMole(s) IV Intermittent once    MEDICATIONS  (PRN):  benzocaine 15 mG/menthol 3.6 mG Lozenge 1 Lozenge Oral four times a day PRN Sore Throat  dextrose 40% Gel 15 Gram(s) Oral once PRN Blood Glucose LESS THAN 70 milliGRAM(s)/deciLiter  glucagon  Injectable 1 milliGRAM(s) IntraMuscular once PRN Glucose <70 milliGRAM(s)/deciLiter  HYDROmorphone  Injectable 0.5 milliGRAM(s) IV Push every 4 hours PRN Severe Pain (7 - 10)      LABS:                        7.8    8.33  )-----------( 197      ( 15 Jul 2019 08:37 )             25.8     Hgb Trend: 7.8<--, 7.3<--, 7.7<--, 7.2<--, 8.2<--  07-15    142  |  104  |  14  ----------------------------<  113<H>  4.1   |  27  |  1.24    Ca    8.5      15 Jul 2019 06:32  Phos  2.0     07-15  Mg     2.2     07-15      Creatinine Trend: 1.24<--, 1.40<--, 1.46<--, 1.46<--, 1.49<--, 1.56<--        MICROBIOLOGY:       RADIOLOGY:  < from: CT Chest w/ IV Cont (07.10.19 @ 17:56) >  EXAM:  CT CHEST IC                          EXAM:  CT ABDOMEN AND PELVIS OC IC                            PROCEDURE DATE:  07/10/2019            INTERPRETATION:  CLINICAL INFORMATION: New gastric tumor found in recent   imaging. Staging. Dizziness,shoulder and chest pain.    COMPARISON: CT abdomen and pelvis 7/5/2019. CT chest 7/3/2019.    PROCEDURE:   CT of the Chest, Abdomen and Pelvis was performed with intravenous   contrast.   Intravenous contrast: 90 ml Omnipaque 350. 10 ml discarded.  Oral contrast: None.  Sagittal and coronal reformats were performed.    FINDINGS:    CHEST:     LUNGS AND LARGE AIRWAYS: Patent central airways. Multiple scattered   bilateral small parenchymal calcifications. Bilateral lower lobe   predominant peripheral reticular opacities with minimal traction   bronchiectasis, unchanged.  PLEURA: No pleural effusion.  VESSELS: Within normal limits.  HEART: Heart size is normal. No pericardial effusion. Coronary artery   calcifications.  MEDIASTINUM AND SONA: Nolymphadenopathy.  CHEST WALL AND LOWER NECK: Within normal limits.    ABDOMEN AND PELVIS:    LIVER: Within normal limits.  BILE DUCTS: Normal caliber.  GALLBLADDER: Within normal limits.  SPLEEN: Within normal limits.  PANCREAS: Within normal limits.  ADRENALS: Within normal limits.  KIDNEYS/URETERS: Within normal limits.    BLADDER: Within normal limits.  REPRODUCTIVE ORGANS: Prostate within normal limits.    BOWEL: A 3.2 x 5.0 cm gastric cardia/fundal lesser curvature mass with   ulceration/fistulization to the gastric lumen. No bowel obstruction.   Appendix is normal. Colonic diverticulosis without diverticulitis.  PERITONEUM: No ascites.  VESSELS:  Mild atherosclerotic changes.  RETROPERITONEUM: No lymphadenopathy.    ABDOMINAL WALL: Tinyfat-containing umbilical hernia.  BONES: Degenerative changes of the spine.    IMPRESSION:     A 3.2 x 5.0 cm ulcerating gastric cardia/fundal mass.    No evidence of metastatic disease in the chest, abdomen and pelvis        TEAGAN FERNANDEZ M.D., RADIOLOGY RESIDENT  This document has been electronically signed.  JANELLE SAUCEDO M.D., ATTENDING RADIOLOGIST  This document has been electronically signed. Jul 11 2019 11:19AM    < end of copied text >

## 2019-07-15 NOTE — PROGRESS NOTE ADULT - SUBJECTIVE AND OBJECTIVE BOX
BLUE SURGERY DAILY PROGRESS NOTE:       SUBJECTIVE/ROS: Patient feels well- passing flatus no bowel movement  Denies nausea, vomiting, chest pain, shortness of breath         MEDICATIONS  (STANDING):  dextrose 5% + sodium chloride 0.45% with potassium chloride 20 mEq/L 1000 milliLiter(s) (75 mL/Hr) IV Continuous <Continuous>  dextrose 5%. 1000 milliLiter(s) (50 mL/Hr) IV Continuous <Continuous>  dextrose 50% Injectable 12.5 Gram(s) IV Push once  dextrose 50% Injectable 25 Gram(s) IV Push once  dextrose 50% Injectable 25 Gram(s) IV Push once  enoxaparin Injectable 40 milliGRAM(s) SubCutaneous every 24 hours  insulin lispro (HumaLOG) corrective regimen sliding scale   SubCutaneous every 6 hours  metoprolol tartrate Injectable 5 milliGRAM(s) IV Push every 6 hours  pantoprazole  Injectable 40 milliGRAM(s) IV Push daily  sodium chloride 0.9% lock flush 3 milliLiter(s) IV Push every 8 hours  sodium phosphate IVPB 30 milliMole(s) IV Intermittent once    MEDICATIONS  (PRN):  benzocaine 15 mG/menthol 3.6 mG Lozenge 1 Lozenge Oral four times a day PRN Sore Throat  dextrose 40% Gel 15 Gram(s) Oral once PRN Blood Glucose LESS THAN 70 milliGRAM(s)/deciLiter  glucagon  Injectable 1 milliGRAM(s) IntraMuscular once PRN Glucose <70 milliGRAM(s)/deciLiter  HYDROmorphone  Injectable 0.5 milliGRAM(s) IV Push every 4 hours PRN Severe Pain (7 - 10)      OBJECTIVE:    Vital Signs Last 24 Hrs  T(C): 36.8 (15 Jul 2019 04:25), Max: 37.2 (14 Jul 2019 09:05)  T(F): 98.2 (15 Jul 2019 04:25), Max: 99 (14 Jul 2019 09:05)  HR: 63 (15 Jul 2019 06:53) (59 - 71)  BP: 148/72 (15 Jul 2019 04:25) (130/70 - 160/70)  BP(mean): --  RR: 20 (15 Jul 2019 04:25) (18 - 20)  SpO2: 95% (15 Jul 2019 06:53) (92% - 97%)        I&O's Detail    14 Jul 2019 07:01  -  15 Jul 2019 07:00  --------------------------------------------------------  IN:    IV PiggyBack: 100 mL    sodium chloride 0.45% with potassium chloride 20 mEq/L: 700 mL    sodium chloride 0.9% with potassium chloride 20 mEq/L: 450 mL  Total IN: 1250 mL    OUT:    Nasoenteral Tube: 350 mL    Voided: 2500 mL  Total OUT: 2850 mL    Total NET: -1600 mL          Daily     Daily     LABS:                        7.8    8.33  )-----------( 197      ( 15 Jul 2019 08:37 )             25.8     07-15    142  |  104  |  14  ----------------------------<  113<H>  4.1   |  27  |  1.24    Ca    8.5      15 Jul 2019 06:32  Phos  2.0     07-15  Mg     2.2     07-15          PHYSICAL EXAM  General Appearance: NAD,   Neck: Supple  Chest: non-labored breathing, no respiratory distress  CV: Pulse regular  Abdomen: Soft, mild distended, appropriate tenderness at incisions, incisions clean, dry with steri strips   Extremities: warm and well perfused

## 2019-07-15 NOTE — PROGRESS NOTE ADULT - ASSESSMENT
7/5/19 the p over the last few weeks has been weak dizzy and easily fatigued. He went to see his pmd and the hemoglobin was noted to be in the 8 range where a few months earlier it was 12. He has been under my care  and his hemoglobin was always about 12. I was not contacted with this new finding. He has refused bone marrow biopsies and GI evals in the past. Except for CRF, studies had previously been stable.    He may have been experiencing dark stools over the last few weeks and has not been taking iron pills. The guaiac here was negative for blood. The pt was seen by pulmonary here for interstitial changes in the lungs and will have a ct scan to see if further studies are needed. He was a former . The GFR was 41 and bun was 26 and the white cell count was 6.6 hemoglobin 8.1 and MCV of 91 and MCHC of 29.8 and rdw of 16.5 . The ferritin was only 17 and this suggests iron deficiency anemia and he is to have a EGD and colonoscopy and I will give him iv iron to see if there is a response and he can follow with Dr Mai from heme onc.    7/8 pt receiving iv iron and gi eval in process, hemoglobin was 8.6.    7/9 the pt is stable and the egd showed a large ulcerated mass that was bleeding in the lesser curvature of the stomach and will await the biopsy, It is thought he may have a GIST tumor and this was gone over with the pt and wife and family at the bedside. The iron continues and will continue to check the cbc for now.    7/10 the pt was feeling well but the hemoglobin is down to 7.5 likely from bleeding. He will likely need transfusion before the surgery.      7/11 surgery scheduled on Friday and case again gone over with [facility] pt and the family at the bedside The hemoglobin was up to 8.3 and no recent transfusion was given.     7/12 [facility] off to [facility] and transfusions as needed to be done there.    Post op. Feeling better. Final path pending.  7/15 ng tube out and feeling better and path pending,hemoglobin 7.8.

## 2019-07-15 NOTE — PROGRESS NOTE ADULT - ASSESSMENT
73y Male PMH HTN, HLD, TATYANA, CKD III and DMII initially presenting with asymptomatic anemia found to have bleeding ulceration of lesser curvature of the stomach by EGD concerning for GIST. POD#3 partial gastrectomy for GIST tumor.     - NGT removed --> trial of clears today  - OOB/Ambulate  - Pain control  - DVT ppx  - Incentive spirometry    Briseida Mercado PA-C [id number]

## 2019-07-15 NOTE — PROGRESS NOTE ADULT - SUBJECTIVE AND OBJECTIVE BOX
Patient is a 73y old  Male who presents with a chief complaint of symptomatic anemia (05 Jul 2019 08:54)      HPI:  74 yo  male (no implantable devices) PMH HTN, HLD, TATYANA, CKD III and DMT2 (a1c June 2019, managed by PCP, uncomplicated) presents for cardiac angiogram. Patient reports occasional episodes of moderate chest discomfort associated with dizziness, dyspnea and lightheadedness with moderate exertion x 2 weeks ago. Seen and evaluated by Dr. Mortensen (cards) recommended to have nuclear stress test which was completed on 6/25/19 revealing normal results, normal EF and wall motion, diaphragmatic attenuation. Symptoms still persist- recommends to have LHC for further ischemic evaluation. (02 Jul 2019 09:24)    7/5/19 the p over the last few weeks has been weak dizzy and easily fatigued. He went to see his pmd and the hemoglobin was noted to be in the 8 range where a few months earlier it was 12. He has been under the care of Dr Marc Mai and his hemoglobin was always about 12. He may have been experiencing dark stools over the last few weeks and has not been taking iron pills. The guaiac here was negative for blood. The pt was seen by pulmonary here for interstitial changes in the lungs and will have a ct scan to see if further studies are needed. He was a former . The GFR was 41 and bun was 26 and the white cell count was 6.6 hemoglobin 8.1 and MCV of 91 and MCHC of 29.8 and rdw of 16.5 . The ferritin was only 17 and this suggests iron deficiency anemia and he is to have a EGD and colonoscopy and I will give him iv iron to see if there is a response and he can follow with Dr Mai from heme onc.  7/8 pt receiving iv iron and gi eval in process, hemoglobin was 8.6. 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 The iron continues and will continue to check the cbc for now. 7/10 the pt was feeling well but the hemoglobin is down to 7.5 likely from bleeding. He will likely need transfusion before the surgery. 7/11 surgery scheduled on Friday and case again gone over with [facility] pt and the family at the bedside The hemoglobin was up to 8.3 and no recent transfusion was given. 7/12 [facility] off to the [facility] and transfusions as needed to be done there. 7/15 ng tube out and feeling better and path pending,hemoglobin 7.8.    In past years the patient has refused bone marrow biopsy and repeat endoscopies.    He appears to have a GIST. Final path pending.      Allergies  No Known Allergies        MEDICATIONS  (STANDING):  acetaminophen  IVPB .. 1000 milliGRAM(s) IV Intermittent once  dextrose 5%. 1000 milliLiter(s) (50 mL/Hr) IV Continuous <Continuous>  dextrose 50% Injectable 12.5 Gram(s) IV Push once  dextrose 50% Injectable 25 Gram(s) IV Push once  dextrose 50% Injectable 25 Gram(s) IV Push once  enoxaparin Injectable 40 milliGRAM(s) SubCutaneous every 24 hours  insulin lispro (HumaLOG) corrective regimen sliding scale   SubCutaneous every 6 hours  lactated ringers. 1000 milliLiter(s) (75 mL/Hr) IV Continuous <Continuous>  metoprolol tartrate Injectable 5 milliGRAM(s) IV Push every 6 hours  pantoprazole  Injectable 40 milliGRAM(s) IV Push daily  sodium chloride 0.9% lock flush 3 milliLiter(s) IV Push every 8 hours    MEDICATIONS  (PRN):  benzocaine 15 mG/menthol 3.6 mG Lozenge 1 Lozenge Oral four times a day PRN Sore Throat  dextrose 40% Gel 15 Gram(s) Oral once PRN Blood Glucose LESS THAN 70 milliGRAM(s)/deciLiter  glucagon  Injectable 1 milliGRAM(s) IntraMuscular once PRN Glucose <70 milliGRAM(s)/deciLiter  HYDROmorphone  Injectable 0.5 milliGRAM(s) IV Push every 4 hours PRN Severe Pain (7 - 10)      PAST MEDICAL & SURGICAL HISTORY:  TATYANA (obstructive sleep apnea)  Mitral regurgitation  Diabetes  HLD (hyperlipidemia)  HTN (hypertension)  Abdominal hernia  Uncontrolled hypertension: BP ranged 170-250/--asymptomatic. Sent from [facility] office on 8/12/13 to [facility] ER via ambulance  Snoring: TATYANA precautions--responds affirmatively to STOP BANG questionnaire--admits to loud snoring and daytime somnolence; witnessed apneic events by wife; age &gt; 50; gender, male  Mass: right foot soft tissue mass in August 2013  Tooth decay: surgery for tooth extraction  S/P tonsillectomy          Vital Signs Last 24 Hrs  T(C): 36.7 (14 Jul 2019 04:21), Max: 37.5 (13 Jul 2019 17:01)  T(F): 98 (14 Jul 2019 04:21), Max: 99.5 (13 Jul 2019 17:01)  HR: 62 (14 Jul 2019 06:18) (60 - 72)  BP: 136/68 (14 Jul 2019 04:21) (108/66 - 136/68)  BP(mean): --  RR: 18 (14 Jul 2019 04:21) (17 - 18)  SpO2: 95% (14 Jul 2019 06:18) (93% - 95%)                                      7.8    8.33  )-----------( 197      ( 15 Jul 2019 08:37 )             25.8                                 7.7    10.3  )-----------( 160      ( 13 Jul 2019 14:43 )             23.7       07-13    141  |  102  |  18  ----------------------------<  135<H>  3.9   |  26  |  1.46<H>    Ca    8.3<L>      13 Jul 2019 08:14  Phos  3.0     07-13  Mg     2.2     07-13              ROS:  Feeling overall better.  + gas        PHYSICAL EXAM   General: adult in NAD, NGT in place. Appears much more comfortable.  HEENT: clear oropharynx, anicteric sclera, pink conjunctivae  Neck: supple  CV: normal S1S2 with no murmur rubs or gallops  Lungs: clear to auscultation, no wheezes, no rales  Abdomen: soft non-tender non-distended, no hepatosplenomegaly  Ext: no clubbing cyanosis or edema  Skin: no rashes and no petechiae  Neuro: alert and oriented X3 no focal deficits

## 2019-07-16 ENCOUNTER — TRANSCRIPTION ENCOUNTER (OUTPATIENT)
Age: 74
End: 2019-07-16

## 2019-07-16 VITALS
SYSTOLIC BLOOD PRESSURE: 128 MMHG | DIASTOLIC BLOOD PRESSURE: 69 MMHG | OXYGEN SATURATION: 98 % | RESPIRATION RATE: 18 BRPM | HEART RATE: 74 BPM | TEMPERATURE: 99 F

## 2019-07-16 LAB
ANION GAP SERPL CALC-SCNC: 12 MMOL/L — SIGNIFICANT CHANGE UP (ref 5–17)
BUN SERPL-MCNC: 15 MG/DL — SIGNIFICANT CHANGE UP (ref 7–23)
CALCIUM SERPL-MCNC: 8.4 MG/DL — SIGNIFICANT CHANGE UP (ref 8.4–10.5)
CHLORIDE SERPL-SCNC: 102 MMOL/L — SIGNIFICANT CHANGE UP (ref 96–108)
CO2 SERPL-SCNC: 27 MMOL/L — SIGNIFICANT CHANGE UP (ref 22–31)
CREAT SERPL-MCNC: 1.25 MG/DL — SIGNIFICANT CHANGE UP (ref 0.5–1.3)
GLUCOSE BLDC GLUCOMTR-MCNC: 105 MG/DL — HIGH (ref 70–99)
GLUCOSE BLDC GLUCOMTR-MCNC: 113 MG/DL — HIGH (ref 70–99)
GLUCOSE SERPL-MCNC: 95 MG/DL — SIGNIFICANT CHANGE UP (ref 70–99)
HCT VFR BLD CALC: 27.4 % — LOW (ref 39–50)
HGB BLD-MCNC: 7.9 G/DL — LOW (ref 13–17)
MCHC RBC-ENTMCNC: 26.3 PG — LOW (ref 27–34)
MCHC RBC-ENTMCNC: 28.8 GM/DL — LOW (ref 32–36)
MCV RBC AUTO: 91.3 FL — SIGNIFICANT CHANGE UP (ref 80–100)
PHOSPHATE SERPL-MCNC: 2.7 MG/DL — SIGNIFICANT CHANGE UP (ref 2.5–4.5)
PLATELET # BLD AUTO: 209 K/UL — SIGNIFICANT CHANGE UP (ref 150–400)
POTASSIUM SERPL-MCNC: 3.9 MMOL/L — SIGNIFICANT CHANGE UP (ref 3.5–5.3)
POTASSIUM SERPL-SCNC: 3.9 MMOL/L — SIGNIFICANT CHANGE UP (ref 3.5–5.3)
RBC # BLD: 3 M/UL — LOW (ref 4.2–5.8)
RBC # FLD: 16.4 % — HIGH (ref 10.3–14.5)
SODIUM SERPL-SCNC: 141 MMOL/L — SIGNIFICANT CHANGE UP (ref 135–145)
WBC # BLD: 6.74 K/UL — SIGNIFICANT CHANGE UP (ref 3.8–10.5)
WBC # FLD AUTO: 6.74 K/UL — SIGNIFICANT CHANGE UP (ref 3.8–10.5)

## 2019-07-16 PROCEDURE — 82435 ASSAY OF BLOOD CHLORIDE: CPT

## 2019-07-16 PROCEDURE — 86036 ANCA SCREEN EACH ANTIBODY: CPT

## 2019-07-16 PROCEDURE — 88309 TISSUE EXAM BY PATHOLOGIST: CPT

## 2019-07-16 PROCEDURE — 82803 BLOOD GASES ANY COMBINATION: CPT

## 2019-07-16 PROCEDURE — 83605 ASSAY OF LACTIC ACID: CPT

## 2019-07-16 PROCEDURE — 84466 ASSAY OF TRANSFERRIN: CPT

## 2019-07-16 PROCEDURE — 85027 COMPLETE CBC AUTOMATED: CPT

## 2019-07-16 PROCEDURE — 88331 PATH CONSLTJ SURG 1 BLK 1SPC: CPT

## 2019-07-16 PROCEDURE — 80053 COMPREHEN METABOLIC PANEL: CPT

## 2019-07-16 PROCEDURE — 71046 X-RAY EXAM CHEST 2 VIEWS: CPT

## 2019-07-16 PROCEDURE — 71045 X-RAY EXAM CHEST 1 VIEW: CPT

## 2019-07-16 PROCEDURE — 83010 ASSAY OF HAPTOGLOBIN QUANT: CPT

## 2019-07-16 PROCEDURE — 84443 ASSAY THYROID STIM HORMONE: CPT

## 2019-07-16 PROCEDURE — 85652 RBC SED RATE AUTOMATED: CPT

## 2019-07-16 PROCEDURE — 93005 ELECTROCARDIOGRAM TRACING: CPT

## 2019-07-16 PROCEDURE — 83550 IRON BINDING TEST: CPT

## 2019-07-16 PROCEDURE — 82728 ASSAY OF FERRITIN: CPT

## 2019-07-16 PROCEDURE — C1773: CPT

## 2019-07-16 PROCEDURE — 94660 CPAP INITIATION&MGMT: CPT

## 2019-07-16 PROCEDURE — 84100 ASSAY OF PHOSPHORUS: CPT

## 2019-07-16 PROCEDURE — 86038 ANTINUCLEAR ANTIBODIES: CPT

## 2019-07-16 PROCEDURE — 82746 ASSAY OF FOLIC ACID SERUM: CPT

## 2019-07-16 PROCEDURE — 82272 OCCULT BLD FECES 1-3 TESTS: CPT

## 2019-07-16 PROCEDURE — 82607 VITAMIN B-12: CPT

## 2019-07-16 PROCEDURE — 74177 CT ABD & PELVIS W/CONTRAST: CPT

## 2019-07-16 PROCEDURE — C1889: CPT

## 2019-07-16 PROCEDURE — 85014 HEMATOCRIT: CPT

## 2019-07-16 PROCEDURE — 83615 LACTATE (LD) (LDH) ENZYME: CPT

## 2019-07-16 PROCEDURE — 86850 RBC ANTIBODY SCREEN: CPT

## 2019-07-16 PROCEDURE — 88342 IMHCHEM/IMCYTCHM 1ST ANTB: CPT

## 2019-07-16 PROCEDURE — 86235 NUCLEAR ANTIGEN ANTIBODY: CPT

## 2019-07-16 PROCEDURE — 85730 THROMBOPLASTIN TIME PARTIAL: CPT

## 2019-07-16 PROCEDURE — 82947 ASSAY GLUCOSE BLOOD QUANT: CPT

## 2019-07-16 PROCEDURE — 85610 PROTHROMBIN TIME: CPT

## 2019-07-16 PROCEDURE — 80048 BASIC METABOLIC PNL TOTAL CA: CPT

## 2019-07-16 PROCEDURE — 84295 ASSAY OF SERUM SODIUM: CPT

## 2019-07-16 PROCEDURE — 36600 WITHDRAWAL OF ARTERIAL BLOOD: CPT

## 2019-07-16 PROCEDURE — 85045 AUTOMATED RETICULOCYTE COUNT: CPT

## 2019-07-16 PROCEDURE — 83735 ASSAY OF MAGNESIUM: CPT

## 2019-07-16 PROCEDURE — 83540 ASSAY OF IRON: CPT

## 2019-07-16 PROCEDURE — 82565 ASSAY OF CREATININE: CPT

## 2019-07-16 PROCEDURE — 82550 ASSAY OF CK (CPK): CPT

## 2019-07-16 PROCEDURE — 71260 CT THORAX DX C+: CPT

## 2019-07-16 PROCEDURE — 86431 RHEUMATOID FACTOR QUANT: CPT

## 2019-07-16 PROCEDURE — 82330 ASSAY OF CALCIUM: CPT

## 2019-07-16 PROCEDURE — 83036 HEMOGLOBIN GLYCOSYLATED A1C: CPT

## 2019-07-16 PROCEDURE — 84132 ASSAY OF SERUM POTASSIUM: CPT

## 2019-07-16 PROCEDURE — 74176 CT ABD & PELVIS W/O CONTRAST: CPT

## 2019-07-16 PROCEDURE — 86901 BLOOD TYPING SEROLOGIC RH(D): CPT

## 2019-07-16 PROCEDURE — 84484 ASSAY OF TROPONIN QUANT: CPT

## 2019-07-16 PROCEDURE — 82962 GLUCOSE BLOOD TEST: CPT

## 2019-07-16 PROCEDURE — 86900 BLOOD TYPING SEROLOGIC ABO: CPT

## 2019-07-16 PROCEDURE — 71250 CT THORAX DX C-: CPT

## 2019-07-16 RX ORDER — PANTOPRAZOLE SODIUM 20 MG/1
1 TABLET, DELAYED RELEASE ORAL
Qty: 30 | Refills: 0
Start: 2019-07-16

## 2019-07-16 RX ADMIN — SODIUM CHLORIDE 3 MILLILITER(S): 9 INJECTION INTRAMUSCULAR; INTRAVENOUS; SUBCUTANEOUS at 05:21

## 2019-07-16 RX ADMIN — LOSARTAN POTASSIUM 50 MILLIGRAM(S): 100 TABLET, FILM COATED ORAL at 05:23

## 2019-07-16 RX ADMIN — ENOXAPARIN SODIUM 40 MILLIGRAM(S): 100 INJECTION SUBCUTANEOUS at 05:23

## 2019-07-16 RX ADMIN — PANTOPRAZOLE SODIUM 40 MILLIGRAM(S): 20 TABLET, DELAYED RELEASE ORAL at 05:23

## 2019-07-16 RX ADMIN — Medication 25 MILLIGRAM(S): at 05:23

## 2019-07-16 NOTE — PROGRESS NOTE ADULT - PROBLEM SELECTOR PROBLEM 1
Anemia
CKD (chronic kidney disease) stage 3, GFR 30-59 ml/min
Tumor
Normocytic anemia

## 2019-07-16 NOTE — PROGRESS NOTE ADULT - ASSESSMENT
73y Male PMH HTN, HLD, TATYANA, CKD III and DMII initially presenting with asymptomatic anemia found to have bleeding ulceration of lesser curvature of the stomach by EGD concerning for GIST. POD#3 partial gastrectomy for GIST tumor.     - tolerated clears, diet advanced to carb consistent (DM2). If tolerates breakfast and lunch can consider d/c home  - OOB/Ambulate  - Pain control  - DVT ppx  - Incentive spirometry    [facility] [id number]

## 2019-07-16 NOTE — PROGRESS NOTE ADULT - PROVIDER SPECIALTY LIST ADULT
Cardiology
Gastroenterology
Heme/Onc
Hospitalist
Internal Medicine
Nephrology
Pulmonology
Surgery
Hospitalist
Cardiology
Hospitalist
Heme/Onc
Hospitalist
Heme/Onc

## 2019-07-16 NOTE — PROGRESS NOTE ADULT - REASON FOR ADMISSION
CP
CP
dizziness
low counts and weakness
CP
Endoscopy/colonoscopy with newly found bleeding submucosal mass
anemia
low counts and weakness
symptomatic anemia
CP
anemia
low counts and weakness

## 2019-07-16 NOTE — PROGRESS NOTE ADULT - SUBJECTIVE AND OBJECTIVE BOX
[facility] DAILY PROGRESS NOTE:       SUBJECTIVE/ROS:  Patient reports feeling well this AM. Passing flatus, no BM yet. Denies pain, nausea, vomiting. Tolerated CLD well yesterday, interested in eating solid foods.          MEDICATIONS  (STANDING):  dextrose 5% + sodium chloride 0.45% with potassium chloride 20 mEq/L 1000 milliLiter(s) (75 mL/Hr) IV Continuous <Continuous>  dextrose 5%. 1000 milliLiter(s) (50 mL/Hr) IV Continuous <Continuous>  dextrose 50% Injectable 12.5 Gram(s) IV Push once  dextrose 50% Injectable 25 Gram(s) IV Push once  dextrose 50% Injectable 25 Gram(s) IV Push once  enoxaparin Injectable 40 milliGRAM(s) SubCutaneous every 24 hours  insulin lispro (HumaLOG) corrective regimen sliding scale   SubCutaneous every 6 hours  metoprolol tartrate Injectable 5 milliGRAM(s) IV Push every 6 hours  pantoprazole  Injectable 40 milliGRAM(s) IV Push daily  sodium chloride 0.9% lock flush 3 milliLiter(s) IV Push every 8 hours  sodium phosphate IVPB 30 milliMole(s) IV Intermittent once    MEDICATIONS  (PRN):  benzocaine 15 mG/menthol 3.6 mG Lozenge 1 Lozenge Oral four times a day PRN Sore Throat  dextrose 40% Gel 15 Gram(s) Oral once PRN Blood Glucose LESS THAN 70 milliGRAM(s)/deciLiter  glucagon  Injectable 1 milliGRAM(s) IntraMuscular once PRN Glucose <70 milliGRAM(s)/deciLiter  HYDROmorphone  Injectable 0.5 milliGRAM(s) IV Push every 4 hours PRN Severe Pain (7 - 10)    OBJECTIVE:    Vital Signs Last 24 Hrs  T(C): 36.4 (16 Jul 2019 04:20), Max: 37.7 (15 Jul 2019 17:07)  T(F): 97.5 (16 Jul 2019 04:20), Max: 99.8 (15 Jul 2019 17:07)  HR: 62 (16 Jul 2019 06:16) (55 - 85)  BP: 135/73 (16 Jul 2019 04:20) (128/69 - 151/74)  BP(mean): --  RR: 18 (16 Jul 2019 04:20) (18 - 19)  SpO2: 95% (16 Jul 2019 06:16) (94% - 98%)      PHYSICAL EXAM  General Appearance: NAD, laying in bed  Neck: Supple  Chest: non-labored breathing, no respiratory distress, wearing CPAP machine   CV: Pulse regular  Abdomen: Soft, nondistended, nontender to palpation, incisions clean, dry with steri strips   Extremities: warm and well perfused        I&O's Detail    15 Jul 2019 07:01  -  16 Jul 2019 07:00  --------------------------------------------------------  IN:    dextrose 5% + sodium chloride 0.45% with potassium chloride 20 mEq/L: 500 mL    Oral Fluid: 800 mL  Total IN: 1300 mL    OUT:    Voided: 1175 mL  Total OUT: 1175 mL    Total NET: 125 mL      16 Jul 2019 07:01  -  16 Jul 2019 09:43  --------------------------------------------------------  IN:  Total IN: 0 mL    OUT:    Voided: 450 mL  Total OUT: 450 mL    Total NET: -450 mL      Labs:  07-16    141  |  102  |  15  ----------------------------<  95  3.9   |  27  |  1.25    Ca    8.4      16 Jul 2019 07:10  Phos  2.7     07-16  Mg     2.2     07-15                            7.8    8.33  )-----------( 197      ( 15 Jul 2019 08:37 )             25.8

## 2019-07-16 NOTE — DISCHARGE NOTE NURSING/CASE MANAGEMENT/SOCIAL WORK - NSDCDPATPORTLINK_GEN_ALL_CORE
You can access the [facility] Portal, offered by [facility], by registering with the following website: [email or web address]

## 2019-07-16 NOTE — PROGRESS NOTE ADULT - SUBJECTIVE AND OBJECTIVE BOX
Patient is a 73y old  Male who presents with a chief complaint of symptomatic anemia (05 Jul 2019 08:54)      HPI:  72 yo  male (no implantable devices) PMH HTN, HLD, TATYANA, CKD III and DMT2 (a1c June 2019, managed by PCP, uncomplicated) presents for cardiac angiogram. Patient reports occasional episodes of moderate chest discomfort associated with dizziness, dyspnea and lightheadedness with moderate exertion x 2 weeks ago. Seen and evaluated by Dr. Mortensen (cards) recommended to have nuclear stress test which was completed on 6/25/19 revealing normal results, normal EF and wall motion, diaphragmatic attenuation. Symptoms still persist- recommends to have LHC for further ischemic evaluation. (02 Jul 2019 09:24)    7/5/19 the p over the last few weeks has been weak dizzy and easily fatigued. He went to see his pmd and the hemoglobin was noted to be in the 8 range where a few months earlier it was 12. He has been under the care of Dr Marc Mai and his hemoglobin was always about 12. He may have been experiencing dark stools over the last few weeks and has not been taking iron pills. The guaiac here was negative for blood. The pt was seen by pulmonary here for interstitial changes in the lungs and will have a ct scan to see if further studies are needed. He was a former . The GFR was 41 and bun was 26 and the white cell count was 6.6 hemoglobin 8.1 and MCV of 91 and MCHC of 29.8 and rdw of 16.5 . The ferritin was only 17 and this suggests iron deficiency anemia and he is to have a EGD and colonoscopy and I will give him iv iron to see if there is a response and he can follow with Dr Mai from heme onc.  7/8 pt receiving iv iron and gi eval in process, hemoglobin was 8.6. 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 The iron continues and will continue to check the cbc for now. 7/10 the pt was feeling well but the hemoglobin is down to 7.5 likely from bleeding. He will likely need transfusion before the surgery. 7/11 surgery scheduled on Friday and case again gone over with [facility] pt and the family at the bedside The hemoglobin was up to 8.3 and no recent transfusion was given. 7/12 pt off to the [facility] and transfusions as needed to be done there. 7/15 ng tube out and feeling better and path pending,hemoglobin 7.8.    In past years the patient has refused bone marrow biopsy and repeat endoscopies.    He appears to have a GIST. Final path pending.  7/15 pt might be going home on this date and will see Dr Mai as a outpt. Path still pending.    Allergies  No Known Allergies        MEDICATIONS  (STANDING):  acetaminophen  IVPB .. 1000 milliGRAM(s) IV Intermittent once  dextrose 5%. 1000 milliLiter(s) (50 mL/Hr) IV Continuous <Continuous>  dextrose 50% Injectable 12.5 Gram(s) IV Push once  dextrose 50% Injectable 25 Gram(s) IV Push once  dextrose 50% Injectable 25 Gram(s) IV Push once  enoxaparin Injectable 40 milliGRAM(s) SubCutaneous every 24 hours  insulin lispro (HumaLOG) corrective regimen sliding scale   SubCutaneous every 6 hours  lactated ringers. 1000 milliLiter(s) (75 mL/Hr) IV Continuous <Continuous>  metoprolol tartrate Injectable 5 milliGRAM(s) IV Push every 6 hours  pantoprazole  Injectable 40 milliGRAM(s) IV Push daily  sodium chloride 0.9% lock flush 3 milliLiter(s) IV Push every 8 hours    MEDICATIONS  (PRN):  benzocaine 15 mG/menthol 3.6 mG Lozenge 1 Lozenge Oral four times a day PRN Sore Throat  dextrose 40% Gel 15 Gram(s) Oral once PRN Blood Glucose LESS THAN 70 milliGRAM(s)/deciLiter  glucagon  Injectable 1 milliGRAM(s) IntraMuscular once PRN Glucose <70 milliGRAM(s)/deciLiter  HYDROmorphone  Injectable 0.5 milliGRAM(s) IV Push every 4 hours PRN Severe Pain (7 - 10)      PAST MEDICAL & SURGICAL HISTORY:  TATYANA (obstructive sleep apnea)  Mitral regurgitation  Diabetes  HLD (hyperlipidemia)  HTN (hypertension)  Abdominal hernia  Uncontrolled hypertension: BP ranged 170-250/--asymptomatic. Sent from [facility]ST office on 8/12/13 to [facility] ER via ambulance  Snoring: TATYANA precautions--responds affirmatively to STOP BANG questionnaire--admits to loud snoring and daytime somnolence; witnessed apneic events by wife; age &gt; 50; gender, male  Mass: right foot soft tissue mass in August 2013  Tooth decay: surgery for tooth extraction  S/P tonsillectomy          Vital Signs Last 24 Hrs  T(C): 36.7 (14 Jul 2019 04:21), Max: 37.5 (13 Jul 2019 17:01)  T(F): 98 (14 Jul 2019 04:21), Max: 99.5 (13 Jul 2019 17:01)  HR: 62 (14 Jul 2019 06:18) (60 - 72)  BP: 136/68 (14 Jul 2019 04:21) (108/66 - 136/68)  BP(mean): --  RR: 18 (14 Jul 2019 04:21) (17 - 18)  SpO2: 95% (14 Jul 2019 06:18) (93% - 95%)                                      7.8    8.33  )-----------( 197      ( 15 Jul 2019 08:37 )             25.8                                 7.7    10.3  )-----------( 160      ( 13 Jul 2019 14:43 )             23.7       07-13    141  |  102  |  18  ----------------------------<  135<H>  3.9   |  26  |  1.46<H>    Ca    8.3<L>      13 Jul 2019 08:14  Phos  3.0     07-13  Mg     2.2     07-13              ROS:  Feeling overall better.  + gas        PHYSICAL EXAM   General: adult in NAD, NGT in place. Appears much more comfortable.  HEENT: clear oropharynx, anicteric sclera, pink conjunctivae  Neck: supple  CV: normal S1S2 with no murmur rubs or gallops  Lungs: clear to auscultation, no wheezes, no rales  Abdomen: soft non-tender non-distended, no hepatosplenomegaly  Ext: no clubbing cyanosis or edema  Skin: no rashes and no petechiae  Neuro: alert and oriented X3 no focal deficits

## 2019-07-16 NOTE — PROGRESS NOTE ADULT - PROBLEM SELECTOR PROBLEM 2
Malignant gastrointestinal stromal tumor (GIST) of stomach
Dyspnea on exertion

## 2019-07-25 ENCOUNTER — EMERGENCY (EMERGENCY)
Facility: HOSPITAL | Age: 74
LOS: 1 days | Discharge: HOME | End: 2019-07-25
Payer: MEDICARE

## 2019-07-25 ENCOUNTER — APPOINTMENT (OUTPATIENT)
Dept: SURGICAL ONCOLOGY | Facility: CLINIC | Age: 74
End: 2019-07-25
Payer: MEDICARE

## 2019-07-25 VITALS
OXYGEN SATURATION: 97 % | HEART RATE: 55 BPM | SYSTOLIC BLOOD PRESSURE: 98 MMHG | RESPIRATION RATE: 15 BRPM | DIASTOLIC BLOOD PRESSURE: 59 MMHG | BODY MASS INDEX: 33.37 KG/M2 | WEIGHT: 260 LBS | HEIGHT: 74 IN

## 2019-07-25 VITALS
RESPIRATION RATE: 17 BRPM | SYSTOLIC BLOOD PRESSURE: 139 MMHG | OXYGEN SATURATION: 98 % | HEIGHT: 74 IN | HEART RATE: 56 BPM | WEIGHT: 255.07 LBS | DIASTOLIC BLOOD PRESSURE: 76 MMHG | TEMPERATURE: 98 F

## 2019-07-25 VITALS
TEMPERATURE: 98 F | RESPIRATION RATE: 18 BRPM | OXYGEN SATURATION: 98 % | HEART RATE: 60 BPM | SYSTOLIC BLOOD PRESSURE: 126 MMHG | DIASTOLIC BLOOD PRESSURE: 68 MMHG

## 2019-07-25 LAB
ALBUMIN SERPL ELPH-MCNC: 4.4 G/DL — SIGNIFICANT CHANGE UP (ref 3.3–5)
ALP SERPL-CCNC: 72 U/L — SIGNIFICANT CHANGE UP (ref 40–120)
ALT FLD-CCNC: 23 U/L — SIGNIFICANT CHANGE UP (ref 10–45)
ANION GAP SERPL CALC-SCNC: 14 MMOL/L — SIGNIFICANT CHANGE UP (ref 5–17)
APTT BLD: 33.8 SEC — SIGNIFICANT CHANGE UP (ref 27.5–36.3)
AST SERPL-CCNC: 20 U/L — SIGNIFICANT CHANGE UP (ref 10–40)
BASOPHILS # BLD AUTO: 0 K/UL — SIGNIFICANT CHANGE UP (ref 0–0.2)
BASOPHILS NFR BLD AUTO: 0.4 % — SIGNIFICANT CHANGE UP (ref 0–2)
BILIRUB SERPL-MCNC: 0.4 MG/DL — SIGNIFICANT CHANGE UP (ref 0.2–1.2)
BUN SERPL-MCNC: 30 MG/DL — HIGH (ref 7–23)
CALCIUM SERPL-MCNC: 9.7 MG/DL — SIGNIFICANT CHANGE UP (ref 8.4–10.5)
CHLORIDE SERPL-SCNC: 97 MMOL/L — SIGNIFICANT CHANGE UP (ref 96–108)
CO2 SERPL-SCNC: 29 MMOL/L — SIGNIFICANT CHANGE UP (ref 22–31)
CREAT SERPL-MCNC: 1.62 MG/DL — HIGH (ref 0.5–1.3)
EOSINOPHIL # BLD AUTO: 0.1 K/UL — SIGNIFICANT CHANGE UP (ref 0–0.5)
EOSINOPHIL NFR BLD AUTO: 1.5 % — SIGNIFICANT CHANGE UP (ref 0–6)
GLUCOSE SERPL-MCNC: 132 MG/DL — HIGH (ref 70–99)
HCT VFR BLD CALC: 28.6 % — LOW (ref 39–50)
HGB BLD-MCNC: 9.6 G/DL — LOW (ref 13–17)
INR BLD: 1.27 RATIO — HIGH (ref 0.88–1.16)
LYMPHOCYTES # BLD AUTO: 1 K/UL — SIGNIFICANT CHANGE UP (ref 1–3.3)
LYMPHOCYTES # BLD AUTO: 10.3 % — LOW (ref 13–44)
MCHC RBC-ENTMCNC: 28.4 PG — SIGNIFICANT CHANGE UP (ref 27–34)
MCHC RBC-ENTMCNC: 33.6 GM/DL — SIGNIFICANT CHANGE UP (ref 32–36)
MCV RBC AUTO: 84.4 FL — SIGNIFICANT CHANGE UP (ref 80–100)
MONOCYTES # BLD AUTO: 0.6 K/UL — SIGNIFICANT CHANGE UP (ref 0–0.9)
MONOCYTES NFR BLD AUTO: 6.4 % — SIGNIFICANT CHANGE UP (ref 2–14)
NEUTROPHILS # BLD AUTO: 7.7 K/UL — HIGH (ref 1.8–7.4)
NEUTROPHILS NFR BLD AUTO: 81.5 % — HIGH (ref 43–77)
PLATELET # BLD AUTO: 449 K/UL — HIGH (ref 150–400)
POTASSIUM SERPL-MCNC: 4.4 MMOL/L — SIGNIFICANT CHANGE UP (ref 3.5–5.3)
POTASSIUM SERPL-SCNC: 4.4 MMOL/L — SIGNIFICANT CHANGE UP (ref 3.5–5.3)
PROT SERPL-MCNC: 7.8 G/DL — SIGNIFICANT CHANGE UP (ref 6–8.3)
PROTHROM AB SERPL-ACNC: 14.6 SEC — HIGH (ref 10–12.9)
RBC # BLD: 3.39 M/UL — LOW (ref 4.2–5.8)
RBC # FLD: 17.8 % — HIGH (ref 10.3–14.5)
SODIUM SERPL-SCNC: 140 MMOL/L — SIGNIFICANT CHANGE UP (ref 135–145)
TROPONIN T, HIGH SENSITIVITY RESULT: 44 NG/L — SIGNIFICANT CHANGE UP (ref 0–51)
TROPONIN T, HIGH SENSITIVITY RESULT: 44 NG/L — SIGNIFICANT CHANGE UP (ref 0–51)
WBC # BLD: 9.4 K/UL — SIGNIFICANT CHANGE UP (ref 3.8–10.5)
WBC # FLD AUTO: 9.4 K/UL — SIGNIFICANT CHANGE UP (ref 3.8–10.5)

## 2019-07-25 PROCEDURE — 80053 COMPREHEN METABOLIC PANEL: CPT

## 2019-07-25 PROCEDURE — 99284 EMERGENCY DEPT VISIT MOD MDM: CPT

## 2019-07-25 PROCEDURE — 93010 ELECTROCARDIOGRAM REPORT: CPT

## 2019-07-25 PROCEDURE — 99024 POSTOP FOLLOW-UP VISIT: CPT

## 2019-07-25 PROCEDURE — 85027 COMPLETE CBC AUTOMATED: CPT

## 2019-07-25 PROCEDURE — 85730 THROMBOPLASTIN TIME PARTIAL: CPT

## 2019-07-25 PROCEDURE — 80048 BASIC METABOLIC PNL TOTAL CA: CPT

## 2019-07-25 PROCEDURE — 99284 EMERGENCY DEPT VISIT MOD MDM: CPT | Mod: 25

## 2019-07-25 PROCEDURE — 82962 GLUCOSE BLOOD TEST: CPT

## 2019-07-25 PROCEDURE — 85610 PROTHROMBIN TIME: CPT

## 2019-07-25 PROCEDURE — 84484 ASSAY OF TROPONIN QUANT: CPT

## 2019-07-25 PROCEDURE — 93005 ELECTROCARDIOGRAM TRACING: CPT

## 2019-07-25 RX ORDER — SODIUM CHLORIDE 9 MG/ML
1000 INJECTION INTRAMUSCULAR; INTRAVENOUS; SUBCUTANEOUS ONCE
Refills: 0 | Status: COMPLETED | OUTPATIENT
Start: 2019-07-25 | End: 2019-07-25

## 2019-07-25 RX ADMIN — SODIUM CHLORIDE 500 MILLILITER(S): 9 INJECTION INTRAMUSCULAR; INTRAVENOUS; SUBCUTANEOUS at 17:26

## 2019-07-25 RX ADMIN — SODIUM CHLORIDE 1000 MILLILITER(S): 9 INJECTION INTRAMUSCULAR; INTRAVENOUS; SUBCUTANEOUS at 19:08

## 2019-07-25 NOTE — ASSESSMENT
[FreeTextEntry1] : 73 year old man s/p laparoscopic resection of a 6cm lesser curvature gastric GIST 2 weeks ago. Recovering well. He does, however appear to be more pale than at time of discharge. While he does not have any signs of ongoing GI bleed I will check a CBC to determine the level of his anemia and the appropriate treatment. He plans to take the results to see his hematologist Dr. Mai. Dr. Mai will also receive the pathology results and determine if the patient is a candidate for adjuvant Gleevec. I have asked the patient to return in 6 months with a contrasted CT abdomen/pelvis.

## 2019-07-25 NOTE — PHYSICAL EXAM
[Normal] : supple, no neck mass and thyroid not enlarged [Normal Neck Lymph Nodes] : normal neck lymph nodes  [Normal Supraclavicular Lymph Nodes] : normal supraclavicular lymph nodes [Normal Groin Lymph Nodes] : normal groin lymph nodes [Normal Axillary Lymph Nodes] : normal axillary lymph nodes [Normal] : oriented to person, place and time, with appropriate affect [de-identified] : wounds clean, dry, intact [de-identified] : the patient's skin and mucous membranes appear pale

## 2019-07-25 NOTE — ED PROVIDER NOTE - PROGRESS NOTE DETAILS
no change in HST, pt feeling well, ambulating around the ED. Pt admits he did not drink enough today, only drank a cup of coffee with breakfast. labs and plan d/w. discussed orthostatic vitals and need to adequately hydrate and change positions slowly. all questions answered. pt ready and stable for DC. -Benjamín Mendoza PA-C

## 2019-07-25 NOTE — ED ADULT NURSE NOTE - CHIEF COMPLAINT QUOTE
had GIST surgery on July 12th, and went for follow up today, was found BP 98/60, fell when he went home co of weakness and dizziness. no a/c

## 2019-07-25 NOTE — ED PROVIDER NOTE - NSFOLLOWUPINSTRUCTIONS_ED_ALL_ED_FT
1) Follow-up with your primary care provider in 1-2 days.    2) Continue to take all medications as prescribed.    3) Rest and drink plenty of fluids. Take at least 1 minute to adjust when changing positions (laying, sitting, or standing) prior to walking around.    4) Return to the ER for any new or worsening symptoms.

## 2019-07-25 NOTE — ED PROVIDER NOTE - PMH
Abdominal hernia    Diabetes    HLD (hyperlipidemia)    HTN (hypertension)    Mass  right foot soft tissue mass in August 2013  Mitral regurgitation    TATYANA (obstructive sleep apnea)    Snoring  TATYANA precautions--responds affirmatively to STOP BANG questionnaire--admits to loud snoring and daytime somnolence; witnessed apneic events by wife; age > 50; gender, male  Uncontrolled hypertension  BP ranged 170-250/--asymptomatic. Sent from PAST office on 8/12/13 to [facility] ER via ambulance

## 2019-07-25 NOTE — ED PROVIDER NOTE - ATTENDING CONTRIBUTION TO CARE
MD Syed:  patient seen and evaluated with the PA.  I was present for key portions of the History and Physical, and I agree with the Impression and Plan.    MD Syed:  72 yo M bib wife after near-syncope while walking up the stairs.  Duration of light-headedness < 60 sec.    Context:  recently had GIST tumor excised 7/12 by Dr. Powers (per wife: good margins; will not need chemo/radiation).  Of note, he was diagnosed with the GIST tumor while being worked up for a symptomatic GI bleed.  Associated Sx:  no CP/SOB, but wife thinks that he is more pale than usual.  Better/worse: no clear modifiers.  VS: wnl.  Physical Exam: adult M, NAD, NCAT, PERRL, EOMI, neck supple, RRR, CTA B, Abd: s/nd/nt, Ext: no edema, Neuro: AAOx3, ambulates w/o diff, strength 5/5 & symmetric throughout.  Impression:  near syncope of unclear etiology.    Plan:  r/o cardiac, r/o symptomatic anemia, r/o electrolyte abnormality.  No cough, no hemoptysis, no leg edema.

## 2019-07-25 NOTE — ED PROVIDER NOTE - CLINICAL SUMMARY MEDICAL DECISION MAKING FREE TEXT BOX
Impression:  near syncope of unclear etiology.    Plan:  r/o cardiac, r/o symptomatic anemia, r/o electrolyte abnormality.  No cough, no hemoptysis, no leg edema.

## 2019-07-25 NOTE — ED ADULT NURSE NOTE - PMH
Abdominal hernia    Diabetes    HLD (hyperlipidemia)    HTN (hypertension)    Mass  right foot soft tissue mass in August 2013  Mitral regurgitation    TATYANA (obstructive sleep apnea)    Snoring  TATYANA precautions--responds affirmatively to STOP BANG questionnaire--admits to loud snoring and daytime somnolence; witnessed apneic events by wife; age > 50; gender, male  Uncontrolled hypertension  BP ranged 170-250/--asymptomatic. Sent from PAST office on 8/12/13 to [facility] ER via ambulance

## 2019-07-25 NOTE — ED ADULT NURSE NOTE - NSIMPLEMENTINTERV_GEN_ALL_ED
Implemented All Universal Safety Interventions:  [place] to call system. Call bell, personal items and telephone within reach. Instruct patient to call for assistance. Room bathroom lighting operational. Non-slip footwear when patient is off stretcher. Physically safe environment: no spills, clutter or unnecessary equipment. Stretcher in lowest position, wheels locked, appropriate side rails in place.

## 2019-07-25 NOTE — ED PROVIDER NOTE - SHIFT CHANGE DETAILS
Impression:  near syncope of unclear etiology; possible symptomatic anemia vs vasovagal/dehydration.  Plan:  enzymes, r/o symptomatic anemia, r/o electrolyte abnormality.  No cough, no hemoptysis, no leg edema.

## 2019-07-25 NOTE — ED PROVIDER NOTE - PHYSICAL EXAMINATION
GEN: Pt in NAD, A&O x3. GCS 15.   EYES: No periorbital ecchymosis, sclera white w/o injection PERRLA, EOMI w/o pain. No conjunctival pallor.  HENT: Head NCAT. Nares without deformity, no DC. No auricular tenderness, no ear DC. no Pickard's sign. Neck supple FROM. No midline or paraspinal tenderness. Trachea midline.   RESP: No chest wall tenderness, CTA b/l, no wheezes, rales, or rhonchi.   CARDIAC: RRR clear distinct S1, S2, no murmurs, gallops, or rubs.   ABDOMEN: Abdomen soft, non-tender. No CVAT b/l.   VASC: 2+ radial and dorsalis pulses b/l.   MSK: No joint erythema or obvious deformities. Spine without obvious deformity. No midline or paraspinal tenderness. FROM w/o pain of UE and LE b/l.   NEURO: CN2-12 intact. Normal and equal sensation UE, LE and face b/l. 5/5 strength upper and lower extremity b/l. Pronator drift negative. Normal gait and gross cerebellar functioning.   SKIN: No rashes or ecchymosis noted.

## 2019-07-25 NOTE — ED ADULT NURSE NOTE - OBJECTIVE STATEMENT
74 yo male A&OX3 presents to the ED with the c/o syncpal episode s/p leaving his PMD office. Pt states that he had surgery to the abd July 12 for a removal of a tumor is the stomach. Pt states that he has a hx of anemia, follows up with a hematologist. + dizziness, no headache, sob or weakness. No n/v/d, was sent 74 yo male A&OX3 presents to the ED with the c/o syncopal episode s/p leaving his PMD office. Pt states that he fell backwards hitting his head. Pt now c/o neck and upper back pain. Syncope witnessed by wife who states pt was diaphoretic and pale. Pt states that he had surgery to the abd July 12 for a removal of a tumor is the stomach. Pt states that he has a hx of anemia, follows up with a hematologist. + dizziness, no headache, sob or weakness. No n/v/d. Denies dark stools, no AC use. Abd round, soft non tender and non distended. MAEX4

## 2019-07-25 NOTE — ED PROVIDER NOTE - OBJECTIVE STATEMENT
74 y/o M with PMhx of HTN, HLD, DM, Mitral regurgitation, chronic anemia with no prior transfusions, s/p removal of GIST on July 12th presents c/o near syncope. Pt states he went to his surgeon for follow-up today, was found BP 98/60 (no similar prior h/o hypotension), reports feeling well while in the office, no different than his usual baseline, tp was instructed to f/u with his hematologist and had an appt this afternoon. At ~13:30 pt was at a friend's hour, had a breif episode lasting "about 4 seconds" of feeling very lightheaded, and had a near syncopal event, hit his upper trapezius on a counter before catching himself, no head trauma, no LOC, denies experiencing CP, SOB, palpitations, HA, change in vision. Pt denies prior similar episodes, no fh/o cardiac dz/arrhythmia. Pt states he was back to baseline after the incident, no complaints in ED currently, feels well and states eh feels better since the surgery on 7/12. Pt denies melena, hematochezia, hemoptysis, easy bleeding/bruising, recent illness, f/c.

## 2019-07-25 NOTE — ED ADULT TRIAGE NOTE - CHIEF COMPLAINT QUOTE
had surgery on July 12th, and went for follow up today, was found BP 98/60, fell when he went home co of weakness and dizziness. no a/c had GIST surgery on July 12th, and went for follow up today, was found BP 98/60, fell when he went home co of weakness and dizziness. no a/c

## 2019-11-06 ENCOUNTER — APPOINTMENT (OUTPATIENT)
Dept: ORTHOPEDIC SURGERY | Facility: CLINIC | Age: 74
End: 2019-11-06
Payer: MEDICARE

## 2019-11-06 VITALS
BODY MASS INDEX: 33.75 KG/M2 | DIASTOLIC BLOOD PRESSURE: 54 MMHG | WEIGHT: 263 LBS | HEART RATE: 72 BPM | HEIGHT: 74 IN | SYSTOLIC BLOOD PRESSURE: 99 MMHG

## 2019-11-06 PROCEDURE — 73080 X-RAY EXAM OF ELBOW: CPT

## 2019-11-06 PROCEDURE — 99203 OFFICE O/P NEW LOW 30 MIN: CPT

## 2019-11-06 NOTE — HISTORY OF PRESENT ILLNESS
[3] : a current pain level of 3/10 [de-identified] : 73-year-old right-hand dominant male presents today with complaints of right elbow pain and swelling. He states that towards the end of September he developed symptoms. He went to urgent care Center. He was placed on oral antibiotics for one week. Subsequently he saw Dr. Davis, orthopedist to the he states performed an aspiration and a placed him on additional medication. He states that symptoms have persisted. He denies any fevers chills or sweats. Denies any prior history of problems with the right elbow. Medical history is significant for diabetes.

## 2019-11-06 NOTE — DISCUSSION/SUMMARY
[de-identified] : Discussion had with the patient. Recommended treatment with empiric oral antibiotics-Bactrim double strength at this time. Continue to monitor clinically. Recommend followup in 10 days. If symptoms worsen or he develops any systemic signs and then he will need to go to the hospital emergency room for admission and IV antibiotics.

## 2019-11-06 NOTE — PHYSICAL EXAM
[UE] : Sensory: Intact in bilateral upper extremities [Rad] : radial 2+ and symmetric bilaterally [Normal] : Alert and in no acute distress [de-identified] : Temperature 98.7 oral.  Right elbow olecranon bursal swelling/erythema. Right elbow pain with active elbow flexion/extension. No appreciable joint effusion. Normal active range of motion of right wrist and hand. 2+ radial pulse. Intact  strength. Left elbow: Skin intact. No warmth and no swelling. Normal active motion. [de-identified] : X-rays right elbow AP, lateral and oblique views taken in the office today demonstrate an entheophyte off the posterior aspect of the olecranon

## 2019-11-22 ENCOUNTER — APPOINTMENT (OUTPATIENT)
Dept: ORTHOPEDIC SURGERY | Facility: CLINIC | Age: 74
End: 2019-11-22
Payer: MEDICARE

## 2019-11-22 DIAGNOSIS — M71.121 OTHER INFECTIVE BURSITIS, RIGHT ELBOW: ICD-10-CM

## 2019-11-22 PROCEDURE — 99213 OFFICE O/P EST LOW 20 MIN: CPT

## 2020-01-02 NOTE — PRE-OP CHECKLIST - RESPIRATORY RATE (BREATHS/MIN)
General: No scleral icterus. Right eye: No discharge. Left eye: No discharge. Extraocular Movements:      Right eye: Normal extraocular motion. Left eye: Normal extraocular motion. Conjunctiva/sclera: Conjunctivae normal.      Pupils: Pupils are equal, round, and reactive to light. Comments: Conjunctiva clear, no photophobia   Neck:      Musculoskeletal: Normal range of motion. Thyroid: No thyromegaly. Vascular: No JVD. Comments: No meningismus  Cardiovascular:      Rate and Rhythm: Normal rate and regular rhythm. Pulses: Normal pulses. Heart sounds: Normal heart sounds, S1 normal and S2 normal. No murmur. No friction rub. No gallop. Pulmonary:      Effort: Pulmonary effort is normal. No respiratory distress. Breath sounds: Normal breath sounds. No stridor. No wheezing or rales. Comments: No cough, lungs clear  Chest:      Chest wall: No tenderness. Abdominal:      General: Bowel sounds are normal. There is no distension. Palpations: Abdomen is soft. There is no mass. Tenderness: There is no tenderness. There is no guarding or rebound. Comments: Soft nontender   Musculoskeletal: Normal range of motion. General: No tenderness. Comments: Joints normal   Lymphadenopathy:      Cervical: Cervical adenopathy present. Right cervical: Superficial cervical adenopathy present. No deep or posterior cervical adenopathy. Left cervical: Superficial cervical adenopathy present. No deep or posterior cervical adenopathy. Skin:     General: Skin is warm and dry. Findings: No erythema or rash. Comments: No rash or bruising on examined areas   Neurological:      Mental Status: She is alert and oriented to person, place, and time. Cranial Nerves: No cranial nerve deficit. Motor: No abnormal muscle tone. Coordination: Coordination normal.      Deep Tendon Reflexes: Reflexes are normal and symmetric.
16

## 2020-01-20 ENCOUNTER — FORM ENCOUNTER (OUTPATIENT)
Age: 75
End: 2020-01-20

## 2020-01-21 ENCOUNTER — APPOINTMENT (OUTPATIENT)
Dept: CT IMAGING | Facility: CLINIC | Age: 75
End: 2020-01-21
Payer: MEDICARE

## 2020-01-21 ENCOUNTER — OUTPATIENT (OUTPATIENT)
Dept: OUTPATIENT SERVICES | Facility: HOSPITAL | Age: 75
LOS: 1 days | End: 2020-01-21
Payer: MEDICARE

## 2020-01-21 DIAGNOSIS — Z00.8 ENCOUNTER FOR OTHER GENERAL EXAMINATION: ICD-10-CM

## 2020-01-21 PROCEDURE — 74177 CT ABD & PELVIS W/CONTRAST: CPT

## 2020-01-21 PROCEDURE — 82565 ASSAY OF CREATININE: CPT

## 2020-01-21 PROCEDURE — 74177 CT ABD & PELVIS W/CONTRAST: CPT | Mod: 26

## 2020-02-03 ENCOUNTER — APPOINTMENT (OUTPATIENT)
Dept: PULMONOLOGY | Facility: CLINIC | Age: 75
End: 2020-02-03
Payer: MEDICARE

## 2020-02-03 VITALS
HEART RATE: 62 BPM | WEIGHT: 268 LBS | DIASTOLIC BLOOD PRESSURE: 74 MMHG | RESPIRATION RATE: 15 BRPM | TEMPERATURE: 97.5 F | SYSTOLIC BLOOD PRESSURE: 136 MMHG | HEIGHT: 74 IN | BODY MASS INDEX: 34.39 KG/M2

## 2020-02-03 PROCEDURE — 99214 OFFICE O/P EST MOD 30 MIN: CPT

## 2020-02-03 NOTE — ASSESSMENT
[FreeTextEntry1] : 73yo M with history of severe TATYANA on CPAP with excellent compliance and therapy related AHI of 0.3/h. He denies any mask leakages or problems related to the machine. He also feels well during the day and denies excessive daytime somnolence or other issues. \par \par In the last year, he was diagnosed with a GI stromal tumor that was removed surgically\par \par Data from machine was reviewed and is as follows:\par DME -- [employer]\par Device -- Airsense 10 [employer]et\par Percent days with device usage in last 30 days -- 100%\par Average usage (days used) -- 8h 22min\par Average treatment related BRANDI -- 0.3/h\par Mask leakage -- acceptable\par Pressure -- 13 cm H20 EPR 2\par \par Patient to continue CPAP nightly as he is deriving benefit from this from an objective and subjective standpoint\par \par I explained the rationale for treatment of TATYANA -- to improve quality of life, daytime function and to decrease the cardiometabolic and other medical risks that are associated with untreated TATYANA. The patient verbalized understanding.\par \par I also explained that the patient can expect a follow up call once results of the above study becomes available.\par

## 2020-02-03 NOTE — HISTORY OF PRESENT ILLNESS
[FreeTextEntry1] : 75yo M with history of severe TATYANA on CPAP with excellent compliance and therapy related AHI of 0.3/h. He denies any mask leakages or problems related to the machine. He also feels well during the day and denies excessive daytime somnolence or other issues. 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

## 2020-02-03 NOTE — PHYSICAL EXAM
[Normal Appearance] : normal appearance [General Appearance - In No Acute Distress] : no acute distress [Normal Conjunctiva] : the conjunctiva exhibited no abnormalities [Low Lying Soft Palate] : low lying soft palate [IV] : IV [Neck Appearance] : the appearance of the neck was normal [] : the neck was supple [Heart Rate And Rhythm] : heart rate was normal and rhythm regular [Heart Sounds] : normal S1 and S2 [Respiration, Rhythm And Depth] : normal respiratory rhythm and effort [Auscultation Breath Sounds / Voice Sounds] : lungs were clear to auscultation bilaterally [Involuntary Movements] : no involuntary movements were seen [Nail Clubbing] : no clubbing of the fingernails [Skin Color & Pigmentation] : normal skin color and pigmentation [No Focal Deficits] : no focal deficits [Oriented To Time, Place, And Person] : oriented to person, place, and time [Affect] : the affect was normal [Normal Oropharynx] : abnormal oropharynx

## 2020-02-06 ENCOUNTER — APPOINTMENT (OUTPATIENT)
Dept: SURGICAL ONCOLOGY | Facility: CLINIC | Age: 75
End: 2020-02-06
Payer: MEDICARE

## 2020-02-06 VITALS
OXYGEN SATURATION: 95 % | HEART RATE: 60 BPM | WEIGHT: 268 LBS | RESPIRATION RATE: 16 BRPM | BODY MASS INDEX: 34.39 KG/M2 | HEIGHT: 74 IN | SYSTOLIC BLOOD PRESSURE: 136 MMHG | DIASTOLIC BLOOD PRESSURE: 73 MMHG

## 2020-02-06 PROCEDURE — 99214 OFFICE O/P EST MOD 30 MIN: CPT

## 2020-02-06 NOTE — ASSESSMENT
[FreeTextEntry1] : 73 year old man s/p laparoscopic resection of a 6cm lesser curvature gastric GIST 2 July 2019.  Continues to follow with med/onc Dr. Mai who does not recommend adjuvant treatment with Gleevac at this time.  Surveillance CT abdomen/pelvis 1/21/2020 was with signs of recurrence or metastatic disease.\par \par \par Plan:\par Repeat CT A/P July 2020 and follow up afterwards for examination.\par Continued follow up with Dr. Mai.\par Referral back to Dr. Lozoya [facility] for surveillance endoscopy.

## 2020-02-06 NOTE — HISTORY OF PRESENT ILLNESS
[de-identified] : Mr. Pittman presents for a 6 month follow up for his history of GIST.  He is s/p laparoscopic partial gastrectomy for a bleeding gastric GIST. He reports doing well. He is eating well, tolerating liquids and solids. He denies dark coloration of his stool. Final pathology demonstrated a T3N0 6cm GIST of the stomach with 4 negative lymph nodes and 2mitoses/HPF.\par \par He continues to follow with Dr. Mai (med/onc) who does not believe that Gleevac is indicated.  Surveillance CT A/P from 1/21/2020 showed no evidence of recurrence or metastatic disease.\par \par Today he is feeling generally well, regular diet without abdominal discomfort, having regular bowel movements and is maintaining his weight.\par \par Med/Onc: Dr. Mai

## 2020-02-06 NOTE — PHYSICAL EXAM
[Normal] : supple, no neck mass and thyroid not enlarged [Normal Supraclavicular Lymph Nodes] : normal supraclavicular lymph nodes [Normal Neck Lymph Nodes] : normal neck lymph nodes  [Normal] : oriented to person, place and time, with appropriate affect [de-identified] : Soft, NT, ND without palpable passes.

## 2020-02-20 NOTE — HISTORY OF PRESENT ILLNESS
Please let him know that he is iron deficient.  Would like to give him 2 doses of IV Feraheme.  Please schedule him to see me before his second dose to discuss next steps.  I think [facility].  Thank you,  Dave Jerry, JAMES-C [de-identified] : Mr. Pittman presents for 2-week post-op visit following laparoscopic partial gastrectomy for a bleeding gastric GIST. He reports doing well. He is eating well, tolerating liquids and solids. He denies dark coloration of his stool. He is having regular bowel movements. He reports slow improvement in his energy levels and denies pain.\par \par Final pathology demonstrated a T3N0 6cm GIST of the stomach with 4 negative lymph nodes and 2mitoses/HPF.

## 2020-06-30 ENCOUNTER — APPOINTMENT (OUTPATIENT)
Dept: GASTROENTEROLOGY | Facility: CLINIC | Age: 75
End: 2020-06-30
Payer: MEDICARE

## 2020-06-30 VITALS
SYSTOLIC BLOOD PRESSURE: 124 MMHG | TEMPERATURE: 97.4 F | DIASTOLIC BLOOD PRESSURE: 72 MMHG | OXYGEN SATURATION: 97 % | BODY MASS INDEX: 34.39 KG/M2 | WEIGHT: 268 LBS | HEIGHT: 74 IN | HEART RATE: 52 BPM | RESPIRATION RATE: 14 BRPM

## 2020-06-30 DIAGNOSIS — R14.1 GAS PAIN: ICD-10-CM

## 2020-06-30 DIAGNOSIS — Z86.2 PERSONAL HISTORY OF DISEASES OF THE BLOOD AND BLOOD-FORMING ORGANS AND CERTAIN DISORDERS INVOLVING THE IMMUNE MECHANISM: ICD-10-CM

## 2020-06-30 DIAGNOSIS — K31.89 OTHER DISEASES OF STOMACH AND DUODENUM: ICD-10-CM

## 2020-06-30 PROCEDURE — 99214 OFFICE O/P EST MOD 30 MIN: CPT

## 2020-06-30 PROCEDURE — 99204 OFFICE O/P NEW MOD 45 MIN: CPT

## 2020-07-17 PROBLEM — K31.89 GASTRIC MASS: Status: RESOLVED | Noted: 2019-07-25 | Resolved: 2020-07-17

## 2020-07-17 PROBLEM — Z86.2 HISTORY OF IRON DEFICIENCY ANEMIA: Status: RESOLVED | Noted: 2020-07-17 | Resolved: 2020-07-17

## 2020-07-17 NOTE — PHYSICAL EXAM
[General Appearance - In No Acute Distress] : in no acute distress [General Appearance - Alert] : alert [Sclera] : the sclera and conjunctiva were normal [General Appearance - Well Nourished] : well nourished [Extraocular Movements] : extraocular movements were intact [Hearing Threshold Finger Rub Not [place]] : hearing was normal [Neck Appearance] : the appearance of the neck was normal [Examination Of The Oral Cavity] : the lips and gums were normal [Neck Cervical Mass (___cm)] : no neck mass was observed [Auscultation Breath Sounds / Voice Sounds] : lungs were clear to auscultation bilaterally [Heart Rate And Rhythm] : heart rate was normal and rhythm regular [Exaggerated Use Of Accessory Muscles For Inspiration] : no accessory muscle use [Respiration, Rhythm And Depth] : normal respiratory rhythm and effort [Edema] : there was no peripheral edema [Heart Sounds] : normal S1 and S2 [Bowel Sounds] : normal bowel sounds [Abdomen Mass (___ Cm)] : no abdominal mass palpated [Abdomen Soft] : soft [Abdomen Tenderness] : non-tender [Cervical Lymph Nodes Enlarged Anterior Bilaterally] : anterior cervical [Cervical Lymph Nodes Enlarged Posterior Bilaterally] : posterior cervical [Supraclavicular Lymph Nodes Enlarged Bilaterally] : supraclavicular [Abnormal Walk] : normal gait [Involuntary Movements] : no involuntary movements were seen [] : no rash [Skin Color & Pigmentation] : normal skin color and pigmentation [No Focal Deficits] : no focal deficits [Impaired Insight] : insight and judgment were intact [FreeTextEntry1] : aox3 [Affect] : the affect was normal

## 2020-07-17 NOTE — HISTORY OF PRESENT ILLNESS
[FreeTextEntry1] : 75 yo M pmh significant UGIB 2/2 ulcerated GIST, now s/p resection following with surg onc who was referred to me for GIST surveillance.\par \par GIST\par Presenting symptoms were upper GIB with ulcerated center and visible vessel - attempted hemospray\par Ultimately s/p resection\par T3N0 6cm GIST of the stomach with 4 negative lymph nodes and 2mitoses/HPF.\par No gleevac\par Negative ct scan 1/2020\par Due for EGD surveillance\par Denies melena, hematemesis, n/v, abd pain\par \par CRC Screening\par last full colon 6+ years ago - maybe due in 5-10 years?\par No clear polyps\par Had colon 2019 - but inadequate prep\par \par PHI\par Was 2/2 bleeding GIST\par Appears to have resolved at this time\par \par Gas:\par Increased flatus and belching\par No other changes with stool\par Denies other gi sx\par No new medications, abx\par No new dietary changes\par No painful, but somewhat bothersome

## 2020-07-17 NOTE — ASSESSMENT
[FreeTextEntry1] : 73 yo M pmh significant UGIB 2/2 ulcerated GIST, now s/p resection following with surg onc who was referred to me for GIST surveillance.  He is due for colon for Crc screening, will perform at same time as EGD.  Labs due for h/o PHI.  Given gas symptoms but no clear source - will start with probiotic.\par \par Impression:\par 1) GIST s/p resection\par 2) h/o PHI\par 3) Colon cancer screening - due\par 4) Gas pains\par \par Plan:\par 1) EGD/Colon\par 2) Labs at next with visit with Heme\par 3) Probiotic\par 4) All discussed at length.  Plan agreed upon. All questions answered\par 5) RV pending above

## 2020-07-26 ENCOUNTER — LABORATORY RESULT (OUTPATIENT)
Age: 75
End: 2020-07-26

## 2020-07-26 ENCOUNTER — APPOINTMENT (OUTPATIENT)
Dept: DISASTER EMERGENCY | Facility: CLINIC | Age: 75
End: 2020-07-26

## 2020-07-29 ENCOUNTER — RESULT REVIEW (OUTPATIENT)
Age: 75
End: 2020-07-29

## 2020-07-29 ENCOUNTER — OUTPATIENT (OUTPATIENT)
Dept: OUTPATIENT SERVICES | Facility: HOSPITAL | Age: 75
LOS: 1 days | End: 2020-07-29
Payer: MEDICARE

## 2020-07-29 ENCOUNTER — APPOINTMENT (OUTPATIENT)
Dept: GASTROENTEROLOGY | Facility: HOSPITAL | Age: 75
End: 2020-07-29

## 2020-07-29 DIAGNOSIS — Z12.11 ENCOUNTER FOR SCREENING FOR MALIGNANT NEOPLASM OF COLON: ICD-10-CM

## 2020-07-29 PROCEDURE — C1889: CPT

## 2020-07-29 PROCEDURE — 45385 COLONOSCOPY W/LESION REMOVAL: CPT

## 2020-07-29 PROCEDURE — 45380 COLONOSCOPY AND BIOPSY: CPT | Mod: 59

## 2020-07-29 PROCEDURE — 43239 EGD BIOPSY SINGLE/MULTIPLE: CPT | Mod: 59

## 2020-07-29 PROCEDURE — 45385 COLONOSCOPY W/LESION REMOVAL: CPT | Mod: PT

## 2020-07-29 PROCEDURE — 88312 SPECIAL STAINS GROUP 1: CPT

## 2020-07-29 PROCEDURE — 88342 IMHCHEM/IMCYTCHM 1ST ANTB: CPT | Mod: 26

## 2020-07-29 PROCEDURE — 43239 EGD BIOPSY SINGLE/MULTIPLE: CPT

## 2020-07-29 PROCEDURE — 88305 TISSUE EXAM BY PATHOLOGIST: CPT | Mod: 26

## 2020-07-29 PROCEDURE — 82962 GLUCOSE BLOOD TEST: CPT

## 2020-07-29 PROCEDURE — 45380 COLONOSCOPY AND BIOPSY: CPT | Mod: PT,XS

## 2020-07-29 PROCEDURE — 43251 EGD REMOVE LESION SNARE: CPT

## 2020-07-29 PROCEDURE — 88341 IMHCHEM/IMCYTCHM EA ADD ANTB: CPT

## 2020-07-29 PROCEDURE — 88341 IMHCHEM/IMCYTCHM EA ADD ANTB: CPT | Mod: 26

## 2020-07-29 PROCEDURE — 88305 TISSUE EXAM BY PATHOLOGIST: CPT

## 2020-07-29 PROCEDURE — 88312 SPECIAL STAINS GROUP 1: CPT | Mod: 26

## 2020-07-29 PROCEDURE — 88342 IMHCHEM/IMCYTCHM 1ST ANTB: CPT

## 2020-08-03 ENCOUNTER — RESULT REVIEW (OUTPATIENT)
Age: 75
End: 2020-08-03

## 2020-08-03 ENCOUNTER — APPOINTMENT (OUTPATIENT)
Dept: CT IMAGING | Facility: CLINIC | Age: 75
End: 2020-08-03
Payer: MEDICARE

## 2020-08-03 ENCOUNTER — OUTPATIENT (OUTPATIENT)
Dept: OUTPATIENT SERVICES | Facility: HOSPITAL | Age: 75
LOS: 1 days | End: 2020-08-03
Payer: MEDICARE

## 2020-08-03 DIAGNOSIS — C49.A0 GASTROINTESTINAL STROMAL TUMOR, UNSPECIFIED SITE: ICD-10-CM

## 2020-08-03 PROCEDURE — 74177 CT ABD & PELVIS W/CONTRAST: CPT

## 2020-08-03 PROCEDURE — 82565 ASSAY OF CREATININE: CPT

## 2020-08-03 PROCEDURE — 74177 CT ABD & PELVIS W/CONTRAST: CPT | Mod: 26

## 2020-08-13 ENCOUNTER — APPOINTMENT (OUTPATIENT)
Dept: SURGICAL ONCOLOGY | Facility: CLINIC | Age: 75
End: 2020-08-13
Payer: MEDICARE

## 2020-08-13 VITALS
SYSTOLIC BLOOD PRESSURE: 145 MMHG | BODY MASS INDEX: 34.01 KG/M2 | WEIGHT: 265 LBS | OXYGEN SATURATION: 98 % | DIASTOLIC BLOOD PRESSURE: 76 MMHG | HEART RATE: 50 BPM | HEIGHT: 74 IN

## 2020-08-13 PROCEDURE — 99214 OFFICE O/P EST MOD 30 MIN: CPT

## 2020-08-13 NOTE — HISTORY OF PRESENT ILLNESS
[de-identified] : Mr. Pittman presents for 2-week post-op visit following laparoscopic partial gastrectomy for a bleeding gastric GIST. He reports doing well. He is eating well, tolerating liquids and solids. He denies dark coloration of his stool. He is having regular bowel movements. He reports slow improvement in his energy levels and denies pain.\par \par Final pathology demonstrated a T3N0 6cm GIST of the stomach with 4 negative lymph nodes and 2mitoses/HPF.\par \par INTERIM 8/13/20: Mr. Pittman reports feeling well and is golfing without trouble. 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 pylori. He also underwent cross-sectional imaging with no evidence of peritoneal recurrence.  He denies any melena or weakness. He denies any incisional hernias.

## 2020-08-13 NOTE — ASSESSMENT
[FreeTextEntry1] : 74 year old man 1 year s/p resection of large low risk, bleeding lesser curvature gastric GIST. MATTHEW, no complaints at this time. Recommend repeat imaging and endoscopy in 1 year, follow up at 1 year as well.

## 2020-08-13 NOTE — PHYSICAL EXAM
[Normal] : supple, no neck mass and thyroid not enlarged [Normal Neck Lymph Nodes] : normal neck lymph nodes  [Normal Supraclavicular Lymph Nodes] : normal supraclavicular lymph nodes [Normal Groin Lymph Nodes] : normal groin lymph nodes [Normal Axillary Lymph Nodes] : normal axillary lymph nodes [de-identified] : wounds clean, dry, intact; healed without incisional hernia [Normal] : oriented to person, place and time, with appropriate affect

## 2020-12-28 ENCOUNTER — NON-APPOINTMENT (OUTPATIENT)
Age: 75
End: 2020-12-28

## 2021-02-03 ENCOUNTER — APPOINTMENT (OUTPATIENT)
Dept: PULMONOLOGY | Facility: CLINIC | Age: 76
End: 2021-02-03
Payer: MEDICARE

## 2021-02-03 VITALS
WEIGHT: 266 LBS | OXYGEN SATURATION: 98 % | TEMPERATURE: 97.7 F | SYSTOLIC BLOOD PRESSURE: 112 MMHG | HEIGHT: 74 IN | HEART RATE: 63 BPM | BODY MASS INDEX: 34.14 KG/M2 | DIASTOLIC BLOOD PRESSURE: 69 MMHG

## 2021-02-03 PROCEDURE — 99214 OFFICE O/P EST MOD 30 MIN: CPT

## 2021-08-27 ENCOUNTER — APPOINTMENT (OUTPATIENT)
Dept: SURGICAL ONCOLOGY | Facility: CLINIC | Age: 76
End: 2021-08-27
Payer: MEDICARE

## 2021-08-27 VITALS
HEIGHT: 73 IN | BODY MASS INDEX: 35.12 KG/M2 | DIASTOLIC BLOOD PRESSURE: 73 MMHG | SYSTOLIC BLOOD PRESSURE: 131 MMHG | RESPIRATION RATE: 16 BRPM | WEIGHT: 265 LBS | OXYGEN SATURATION: 98 % | HEART RATE: 53 BPM | TEMPERATURE: 98 F

## 2021-08-27 PROCEDURE — 99215 OFFICE O/P EST HI 40 MIN: CPT

## 2021-09-03 ENCOUNTER — OUTPATIENT (OUTPATIENT)
Dept: OUTPATIENT SERVICES | Facility: HOSPITAL | Age: 76
LOS: 1 days | End: 2021-09-03
Payer: MEDICARE

## 2021-09-03 ENCOUNTER — APPOINTMENT (OUTPATIENT)
Dept: CT IMAGING | Facility: CLINIC | Age: 76
End: 2021-09-03
Payer: MEDICARE

## 2021-09-03 DIAGNOSIS — C49.A0 GASTROINTESTINAL STROMAL TUMOR, UNSPECIFIED SITE: ICD-10-CM

## 2021-09-03 PROCEDURE — 74177 CT ABD & PELVIS W/CONTRAST: CPT

## 2021-09-03 PROCEDURE — 82565 ASSAY OF CREATININE: CPT

## 2021-09-03 PROCEDURE — 74177 CT ABD & PELVIS W/CONTRAST: CPT | Mod: 26,MH

## 2021-09-07 NOTE — ASSESSMENT
[FreeTextEntry1] : 75 year old man 2 years s/p laparoscopic resection of a 6cm, low risk, low mitotic index gastric GIST. MATTHEW at this time and doing well. He has a small incisional hernia on PE that is not appreciable or symptomatic to him, so no intervention advised at this time. I asked him to undergo surveillance CT at this time. If normal with stretch out to every 2 years.

## 2021-09-07 NOTE — HISTORY OF PRESENT ILLNESS
[de-identified] : Mr. Pittman presents for 2-week post-op visit following laparoscopic partial gastrectomy for a bleeding gastric GIST. He reports doing well. He is eating well, tolerating liquids and solids. He denies dark coloration of his stool. He is having regular bowel movements. He reports slow improvement in his energy levels and denies pain.\par \par Final pathology demonstrated a T3N0 6cm GIST of the stomach with 4 negative lymph nodes and 2mitoses/HPF.\par \par INTERIM 8/13/20: Mr. Pittman reports feeling well and is golfing without trouble. 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 pylori. He also underwent cross-sectional imaging with no evidence of peritoneal recurrence.  He denies any melena or weakness. He denies any incisional hernias.\par \par INTERIM 8/27/21: Mr. Pittman returns for yearly follow up now 2 years s/p laparoscopic resection of a large bleeding GIST of the greater curvature of the stomach. He denies any abdominal pain, nausea, vomiting, fatigue, melena or hematochezia. He reports stable PO intake and diet without difficulty. Last year he underwent EGD and colonoscopy without any evidence of recurrence or malignancy, respectively.

## 2021-09-07 NOTE — PHYSICAL EXAM
[Normal] : supple, no neck mass and thyroid not enlarged [Normal Neck Lymph Nodes] : normal neck lymph nodes  [Normal Supraclavicular Lymph Nodes] : normal supraclavicular lymph nodes [Normal Groin Lymph Nodes] : normal groin lymph nodes [Normal Axillary Lymph Nodes] : normal axillary lymph nodes [Normal] : oriented to person, place and time, with appropriate affect [de-identified] : wounds clean, dry, intact; healed. there is a small midline incisional hernia, auto-reducible, non-tender

## 2022-02-01 ENCOUNTER — APPOINTMENT (OUTPATIENT)
Dept: PULMONOLOGY | Facility: CLINIC | Age: 77
End: 2022-02-01
Payer: MEDICARE

## 2022-02-01 VITALS
SYSTOLIC BLOOD PRESSURE: 101 MMHG | DIASTOLIC BLOOD PRESSURE: 56 MMHG | HEIGHT: 73 IN | BODY MASS INDEX: 34.99 KG/M2 | WEIGHT: 264 LBS | HEART RATE: 73 BPM | OXYGEN SATURATION: 97 % | TEMPERATURE: 97.2 F

## 2022-02-01 DIAGNOSIS — R06.83 SNORING: ICD-10-CM

## 2022-02-01 PROCEDURE — 99214 OFFICE O/P EST MOD 30 MIN: CPT

## 2022-02-01 NOTE — ASSESSMENT
[FreeTextEntry1] : 76yo M with history of severe TATYANA on CPAP with excellent compliance and therapy related AHI of 0.3/h. He denies any mask leakages or problems related to the machine. He also feels well during the day and denies excessive daytime somnolence or other issues. 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 \par \par Will send note to turn on EPR to 3\par Patient will continue CPAP as he is deriving benefit\par I explained the rationale for treatment of TATYANA -- to improve quality of life, daytime function and to decrease the cardiometabolic and other medical risks that are associated with untreated TATYANA. The patient verbalized understanding.

## 2022-02-01 NOTE — HISTORY OF PRESENT ILLNESS
[FreeTextEntry1] : 74yo M with history of severe TATYANA on CPAP with excellent compliance and therapy related AHI of 0.3/h. He denies any mask leakages or problems related to the machine. He also feels well during the day and denies excessive daytime somnolence or other issues. 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

## 2022-05-05 ENCOUNTER — TRANSCRIPTION ENCOUNTER (OUTPATIENT)
Age: 77
End: 2022-05-05

## 2022-05-06 ENCOUNTER — APPOINTMENT (OUTPATIENT)
Dept: DISASTER EMERGENCY | Facility: HOSPITAL | Age: 77
End: 2022-05-06

## 2022-05-06 ENCOUNTER — OUTPATIENT (OUTPATIENT)
Dept: INPATIENT UNIT | Facility: HOSPITAL | Age: 77
LOS: 1 days | End: 2022-05-06
Payer: MEDICARE

## 2022-05-06 VITALS
HEIGHT: 74 IN | DIASTOLIC BLOOD PRESSURE: 81 MMHG | RESPIRATION RATE: 20 BRPM | HEART RATE: 53 BPM | OXYGEN SATURATION: 99 % | WEIGHT: 265 LBS | TEMPERATURE: 98 F | SYSTOLIC BLOOD PRESSURE: 157 MMHG

## 2022-05-06 VITALS
TEMPERATURE: 98 F | RESPIRATION RATE: 18 BRPM | SYSTOLIC BLOOD PRESSURE: 143 MMHG | HEART RATE: 55 BPM | OXYGEN SATURATION: 99 % | DIASTOLIC BLOOD PRESSURE: 77 MMHG

## 2022-05-06 DIAGNOSIS — U07.1 COVID-19: ICD-10-CM

## 2022-05-06 PROCEDURE — M0222: CPT

## 2022-05-06 RX ORDER — BEBTELOVIMAB 87.5 MG/ML
175 INJECTION, SOLUTION INTRAVENOUS ONCE
Refills: 0 | Status: COMPLETED | OUTPATIENT
Start: 2022-05-06 | End: 2022-05-06

## 2022-05-06 RX ADMIN — BEBTELOVIMAB 175 MILLIGRAM(S): 87.5 INJECTION, SOLUTION INTRAVENOUS at 15:36

## 2022-05-06 NOTE — MONOCLONAL ANTIBODY INFUSION - EXAM
CC: Monoclonal Antibody Infusion/COVID 19 Positive  76y Male with past medical history of HTN, DM, HLD, TATYANA,  and recent dx of COVID 19+ who presents today for elective Bebtelovimab. Patient has been screened and was deemed to be a candidate.    Symptoms/ Criteria  Date of Symptom Onset: 5/2/22  Symptoms: HA, fever,   Date of Positive COVID PCR: 5/3/22  Risk Profile includes: Age, HTN       Vaccination Status: Booster Nov 2021    PMHx:  Infection due to severe acute respiratory syndrome coronavirus 2 (SARS-CoV-2)    Mass  Snoring  Uncontrolled hypertension  Abdominal hernia  HTN (hypertension)  HLD (hyperlipidemia)  Diabetes  Mitral regurgitation  TATYANA (obstructive sleep apnea)  S/P tonsillectomy  Tooth decay    Exam/findings:  T(C): 36.6 (05-06-22 @ 15:25), Max: 36.6 (05-06-22 @ 15:25)  HR: 53 (05-06-22 @ 15:25) (53 - 53)  BP: 157/81 (05-06-22 @ 15:25) (157/81 - 157/81)  RR: 20 (05-06-22 @ 15:25) (20 - 20)  SpO2: 99% (05-06-22 @ 15:25) (99% - 99%)    PE:   Appearance: NAD	  HEENT:  NC/AT  Cardiovascular:  No edema  Respiratory: no use of accessory muscles  Gastrointestinal:  non-distended   Skin: warm and dry  Neurologic: Non-focal  Extremities: Normal range of motion    ASSESSMENT:  Pt is COVID positive and symptomatic who was referred for Bebtelovimab monoclonal antibody treatment.    PLAN:  - MAB treatment explained to patient. I have reviewed the Bebtelovimab Emergency Use Authorization (EUA).   - Consent for MAB obtained.   - Risk & benefits discussed. Patient verbalized understanding of plan and agrees to treatment. All questions answered.  - 175mg of Bebtelovimab administered as a single intravenous injection over at least 30 seconds.   - Observe patient for one hour post medication administration and then if stable, discharge home with oupatient follow up as planned by PCP.    POST ASSESSMENT:   Patient completed MAB, and monitored x 1 hour post-infusion with no adverse reactions noted, remained hemodynamically stable.  - Patient tolerated injection well; denies complaints of chest pain/SOB/dizziness/palpitations.   - VSS for discharge home.  - D/C instructions given/ fact sheet included.  - Patient was instructed to self-isolate and use infection control measures (e.g wear mask, isolate, social distance, avoid sharing personal items, clean and disinfect "high touch" surfaces, and frequent handwashing according to the CDC guidelines.   - The patient was informed on what symptoms to be aware of for the next couple of days, and if there are any issues to call the 24/7 clinical call center. Patient was instructed to follow up with primary care provider as needed.    DISCHARGE at 4:30 pm

## 2022-05-06 NOTE — MONOCLONAL ANTIBODY INFUSION - HOME MEDICATIONS
pantoprazole 40 mg oral delayed release tablet , 1 tab(s) orally once a day (before a meal)  labetalol , 200 milligram(s) orally 2 times a day  glipiZIDE 2.5 mg oral tablet, extended release , 1 tab(s) orally once a day  hydroCHLOROthiazide 25 mg oral tablet , 1 tab(s) orally once a day  Crestor 5 mg oral tablet , 1 tab(s) orally once a day  losartan 50 mg oral tablet , 1 tab(s) orally once a day  Norvasc 10 mg oral tablet , 1 tab(s) orally once a day

## 2022-06-28 ENCOUNTER — OUTPATIENT (OUTPATIENT)
Dept: OUTPATIENT SERVICES | Facility: HOSPITAL | Age: 77
LOS: 1 days | End: 2022-06-28
Payer: MEDICARE

## 2022-06-28 ENCOUNTER — APPOINTMENT (OUTPATIENT)
Dept: CT IMAGING | Facility: CLINIC | Age: 77
End: 2022-06-28

## 2022-06-28 DIAGNOSIS — Z00.8 ENCOUNTER FOR OTHER GENERAL EXAMINATION: ICD-10-CM

## 2022-06-28 DIAGNOSIS — C49.A0 GASTROINTESTINAL STROMAL TUMOR, UNSPECIFIED SITE: ICD-10-CM

## 2022-06-28 PROCEDURE — 74176 CT ABD & PELVIS W/O CONTRAST: CPT | Mod: MH

## 2022-06-28 PROCEDURE — 74176 CT ABD & PELVIS W/O CONTRAST: CPT | Mod: 26,MH

## 2022-09-20 ENCOUNTER — APPOINTMENT (OUTPATIENT)
Dept: GASTROENTEROLOGY | Facility: CLINIC | Age: 77
End: 2022-09-20

## 2022-09-20 VITALS
HEIGHT: 73 IN | DIASTOLIC BLOOD PRESSURE: 60 MMHG | BODY MASS INDEX: 34.46 KG/M2 | WEIGHT: 260 LBS | SYSTOLIC BLOOD PRESSURE: 140 MMHG | TEMPERATURE: 98 F | HEART RATE: 70 BPM | OXYGEN SATURATION: 97 %

## 2022-09-20 DIAGNOSIS — Z85.09 PERSONAL HISTORY OF MALIGNANT NEOPLASM OF OTHER DIGESTIVE ORGANS: ICD-10-CM

## 2022-09-20 DIAGNOSIS — D12.6 BENIGN NEOPLASM OF COLON, UNSPECIFIED: ICD-10-CM

## 2022-09-20 DIAGNOSIS — Z12.11 ENCOUNTER FOR SCREENING FOR MALIGNANT NEOPLASM OF COLON: ICD-10-CM

## 2022-09-20 PROCEDURE — 99213 OFFICE O/P EST LOW 20 MIN: CPT

## 2022-09-20 RX ORDER — SULFAMETHOXAZOLE AND TRIMETHOPRIM 800; 160 MG/1; MG/1
800-160 TABLET ORAL TWICE DAILY
Qty: 14 | Refills: 0 | Status: DISCONTINUED | COMMUNITY
Start: 2019-11-22 | End: 2022-09-20

## 2022-09-20 RX ORDER — POLYETHYLENE GLYCOL 3350 AND ELECTROLYTES WITH LEMON FLAVOR 236; 22.74; 6.74; 5.86; 2.97 G/4L; G/4L; G/4L; G/4L; G/4L
236 POWDER, FOR SOLUTION ORAL
Qty: 1 | Refills: 0 | Status: DISCONTINUED | COMMUNITY
Start: 2020-07-09 | End: 2022-09-20

## 2022-09-26 PROBLEM — Z12.11 COLON CANCER SCREENING: Status: RESOLVED | Noted: 2020-06-30 | Resolved: 2022-09-26

## 2022-09-26 PROBLEM — Z85.09 HISTORY OF GASTROINTESTINAL STROMAL TUMOR (GIST): Status: RESOLVED | Noted: 2020-02-05 | Resolved: 2022-09-26

## 2022-09-26 NOTE — ASSESSMENT
[FreeTextEntry1] : 75 yo M pmh significant UGIB 2/2 ulcerated GIST, now s/p resection, h/o tubular adenoma, who presents for follow up.  Has PHI, stable with hematology and no active sign of bleeding.  GIST surveillance being done with CT scans.  Plan for colonoscopy at 3 year kristian as originally planned.  He will call to schedule in first half of the year next year.\par \par Impression:\par 1) GIST s/p resection - negative repeat EGD, negative CT scan\par 2) h/o PHI - stable\par 3) Tubular adenoma - due 2023\par 4) Gas pains - resolved\par \par Plan:\par 1) Colonoscopy 2023 with Kelsey, he will call to scheduled\par 2) Unless change in iron deficiency, would hold off on EGD and defer to surg onc for CT scan surveillance\par 3) All discussed at length. Plan agreed upon. All questions answered\par 4) RV pending above.

## 2022-09-26 NOTE — PHYSICAL EXAM
[Alert] : alert [Normal Voice/Communication] : normal voice/communication [Healthy Appearing] : healthy appearing [No Acute Distress] : no acute distress [Sclera] : the sclera and conjunctiva were normal [Hearing Threshold Finger Rub Not [place]] : hearing was normal [Normal Lips/Gums] : the lips and gums were normal [Oropharynx] : the oropharynx was normal [Normal Appearance] : the appearance of the neck was normal [No Neck Mass] : no neck mass was observed [No Respiratory Distress] : no respiratory distress [No Acc Muscle Use] : no accessory muscle use [Respiration, Rhythm And Depth] : normal respiratory rhythm and effort [Auscultation Breath Sounds / Voice Sounds] : lungs were clear to auscultation bilaterally [Heart Rate And Rhythm] : heart rate was normal and rhythm regular [Normal S1, S2] : normal S1 and S2 [Murmurs] : no murmurs [Bowel Sounds] : normal bowel sounds [Abdomen Tenderness] : non-tender [No Masses] : no abdominal mass palpated [Abdomen Soft] : soft [] : no hepatosplenomegaly [Cervical Lymph Nodes Enlarged Posterior Bilaterally] : no posterior cervical lymphadenopathy [Supraclavicular Lymph Nodes Enlarged Bilaterally] : no supraclavicular lymphadenopathy [Axillary Lymph Nodes Enlarged Bilaterally] : no axillary lymphadenopathy [Abnormal Walk] : normal gait [Involuntary Movements] : no involuntary movements were seen [Normal Color / Pigmentation] : normal skin color and pigmentation [Oriented To Time, Place, And Person] : oriented to person, place, and time [Normal Affect] : the affect was normal [Normal Mood] : the mood was normal

## 2022-09-26 NOTE — HISTORY OF PRESENT ILLNESS
[de-identified] : 7/29/20 - Darell\par Prior anastomosis\par 10 mm polyp s/p cold snare\par Bx of of HP\par \par Path:\par Final Diagnosis\par 1. Stomach, endoscopic biopsy:\par - Gastric mucosa with inactive chronic nonspecific\par gastritis\par - Negative for H. pylori (Warthin-Starry stain)\par - Negative for intestinal metaplasia\par \par 2. Gastric polyp, endoscopic biopsy:\par - Gastric xanthoma, see comment\par - Negative for intestinal metaplasia; Negative for\par dysplasia\par - No evidence of GIST\par \par 3. Cecal polyp, endoscopic biopsy:\par - Tubular adenoma\par \par 4. Ascending colon, polyp, endoscopic biopsy:\par - Tubular adenoma [FreeTextEntry1] : 7/29/20 - Darell\par 3 sessile polyps, all tubular adenoma\par Diverticula\par (See path in EGD section

## 2022-10-02 NOTE — PROGRESS NOTE ADULT - ASSESSMENT
7/5/19 the p over the last few weeks has been weak dizzy and easily fatigued. He went to see his pmd and the hemoglobin was noted to be in the 8 range where a few months earlier it was 12. He has been under my care  and his hemoglobin was always about 12. I was not contacted with this new finding. He has refused bone marrow biopsies and GI evals in the past. Except for CRF, studies had previously been stable.    He may have been experiencing dark stools over the last few weeks and has not been taking iron pills. The guaiac here was negative for blood. The pt was seen by pulmonary here for interstitial changes in the lungs and will have a ct scan to see if further studies are needed. He was a former . The GFR was 41 and bun was 26 and the white cell count was 6.6 hemoglobin 8.1 and MCV of 91 and MCHC of 29.8 and rdw of 16.5 . The ferritin was only 17 and this suggests iron deficiency anemia and he is to have a EGD and colonoscopy and I will give him iv iron to see if there is a response and he can follow with Dr Mai from heme onc.    7/8 pt receiving iv iron and gi eval in process, hemoglobin was 8.6.    7/9 the pt is stable and the egd showed a large ulcerated mass that was bleeding in the lesser curvature of the stomach and will await the biopsy, It is thought he may have a GIST tumor and this was gone over with the pt and wife and family at the bedside. The iron continues and will continue to check the cbc for now.    7/10 the pt was feeling well but the hemoglobin is down to 7.5 likely from bleeding. He will likely need transfusion before the surgery.      7/11 surgery scheduled on Friday and case again gone over with [facility] pt and the family at the bedside The hemoglobin was up to 8.3 and no recent transfusion was given.     7/12 [facility] off to [facility] and transfusions as needed to be done there.    Post op. Feeling better. Final path pending.  7/15 ng tube out and feeling better and path pending,hemoglobin 7.8.  /15 pt might be going home on this date and will see Dr Mai as a outpt. Path still pending. No

## 2023-01-18 LAB — SARS-COV-2 N GENE NPH QL NAA+PROBE: DETECTED

## 2023-01-25 ENCOUNTER — OUTPATIENT (OUTPATIENT)
Dept: OUTPATIENT SERVICES | Facility: HOSPITAL | Age: 78
LOS: 1 days | End: 2023-01-25
Payer: MEDICARE

## 2023-01-25 ENCOUNTER — APPOINTMENT (OUTPATIENT)
Dept: GASTROENTEROLOGY | Facility: HOSPITAL | Age: 78
End: 2023-01-25

## 2023-01-25 ENCOUNTER — RESULT REVIEW (OUTPATIENT)
Age: 78
End: 2023-01-25

## 2023-01-25 ENCOUNTER — TRANSCRIPTION ENCOUNTER (OUTPATIENT)
Age: 78
End: 2023-01-25

## 2023-01-25 VITALS
SYSTOLIC BLOOD PRESSURE: 165 MMHG | HEART RATE: 55 BPM | DIASTOLIC BLOOD PRESSURE: 79 MMHG | OXYGEN SATURATION: 98 % | TEMPERATURE: 97 F | RESPIRATION RATE: 16 BRPM | WEIGHT: 259.93 LBS | HEIGHT: 74 IN

## 2023-01-25 VITALS
OXYGEN SATURATION: 95 % | DIASTOLIC BLOOD PRESSURE: 71 MMHG | RESPIRATION RATE: 19 BRPM | SYSTOLIC BLOOD PRESSURE: 119 MMHG | HEART RATE: 64 BPM

## 2023-01-25 DIAGNOSIS — D3A.092 BENIGN CARCINOID TUMOR OF THE STOMACH: Chronic | ICD-10-CM

## 2023-01-25 DIAGNOSIS — D12.6 BENIGN NEOPLASM OF COLON, UNSPECIFIED: ICD-10-CM

## 2023-01-25 DIAGNOSIS — Z98.890 OTHER SPECIFIED POSTPROCEDURAL STATES: Chronic | ICD-10-CM

## 2023-01-25 DIAGNOSIS — Z85.09 PERSONAL HISTORY OF MALIGNANT NEOPLASM OF OTHER DIGESTIVE ORGANS: ICD-10-CM

## 2023-01-25 LAB — GLUCOSE BLDC GLUCOMTR-MCNC: 93 MG/DL — SIGNIFICANT CHANGE UP (ref 70–99)

## 2023-01-25 PROCEDURE — C1889: CPT

## 2023-01-25 PROCEDURE — 43239 EGD BIOPSY SINGLE/MULTIPLE: CPT

## 2023-01-25 PROCEDURE — 45380 COLONOSCOPY AND BIOPSY: CPT

## 2023-01-25 PROCEDURE — 88341 IMHCHEM/IMCYTCHM EA ADD ANTB: CPT

## 2023-01-25 PROCEDURE — 88305 TISSUE EXAM BY PATHOLOGIST: CPT

## 2023-01-25 PROCEDURE — 82962 GLUCOSE BLOOD TEST: CPT

## 2023-01-25 PROCEDURE — 88305 TISSUE EXAM BY PATHOLOGIST: CPT | Mod: 26

## 2023-01-25 DEVICE — KIT A-LINE 1LUM 20G X 12CM SAFE KIT: Type: IMPLANTABLE DEVICE | Status: FUNCTIONAL

## 2023-01-25 DEVICE — KIT CVC 2LUM MAC 9FR CHG: Type: IMPLANTABLE DEVICE | Status: FUNCTIONAL

## 2023-01-25 DEVICE — CATH THERMODIL PACE 7.5FR: Type: IMPLANTABLE DEVICE | Status: FUNCTIONAL

## 2023-01-25 DEVICE — CLIP RESOLUTION 360 235CM: Type: IMPLANTABLE DEVICE | Status: FUNCTIONAL

## 2023-01-25 RX ORDER — ROSUVASTATIN CALCIUM 5 MG/1
1 TABLET ORAL
Qty: 0 | Refills: 0 | DISCHARGE

## 2023-01-25 RX ORDER — LOSARTAN POTASSIUM 100 MG/1
1 TABLET, FILM COATED ORAL
Qty: 0 | Refills: 0 | DISCHARGE

## 2023-01-25 RX ORDER — SODIUM CHLORIDE 9 MG/ML
500 INJECTION INTRAMUSCULAR; INTRAVENOUS; SUBCUTANEOUS
Refills: 0 | Status: COMPLETED | OUTPATIENT
Start: 2023-01-25 | End: 2023-01-25

## 2023-01-25 RX ORDER — HYDROCHLOROTHIAZIDE 25 MG
1 TABLET ORAL
Qty: 0 | Refills: 0 | DISCHARGE

## 2023-01-25 RX ORDER — AMLODIPINE BESYLATE 2.5 MG/1
1 TABLET ORAL
Qty: 0 | Refills: 0 | DISCHARGE

## 2023-01-25 RX ORDER — LABETALOL HCL 100 MG
200 TABLET ORAL
Qty: 0 | Refills: 0 | DISCHARGE

## 2023-01-25 RX ADMIN — SODIUM CHLORIDE 30 MILLILITER(S): 9 INJECTION INTRAMUSCULAR; INTRAVENOUS; SUBCUTANEOUS at 10:00

## 2023-01-25 NOTE — PRE PROCEDURE NOTE - PRE PROCEDURE EVALUATION
Attending Physician:              Kyle Lozoya MD MSEd    Indication for Procedure   iron deficiency anemia             PAST MEDICAL & SURGICAL HISTORY:  Mass  right foot soft tissue mass in August 2013      Snoring  TATYANA precautions--responds affirmatively to STOP BANG questionnaire--admits to loud snoring and daytime somnolence; witnessed apneic events by wife; age &gt; 50; gender, male      Uncontrolled hypertension  BP ranged 170-250/--asymptomatic. Sent from PAST office on 8/12/13 to [facility] ER via ambulance      Abdominal hernia      HTN (hypertension)      HLD (hyperlipidemia)      Diabetes      Mitral regurgitation      TATYANA (obstructive sleep apnea)      Type 2 diabetes mellitus      S/P tonsillectomy      Tooth decay  surgery for tooth extraction      H/O foot surgery      Benign carcinoid tumor of stomach            See Allscripts Note for further details  ALLERGIES:  No Known Allergies    HOME MEDICATIONS:  Crestor 5 mg oral tablet: 1 tab(s) orally once a day  glipiZIDE 2.5 mg oral tablet, extended release: 1 tab(s) orally once a day  hydroCHLOROthiazide 25 mg oral tablet: 1 tab(s) orally once a day  labetalol: 200 milligram(s) orally 2 times a day  losartan 50 mg oral tablet: 1 tab(s) orally once a day  Norvasc 10 mg oral tablet: 1 tab(s) orally once a day      See Allscripts Note for further details    AICD/PPM: [ ] yes   x] no    PERTINENT LAB DATA:                      PHYSICAL EXAMINATION:    Height (cm): 188  Weight (kg): 117.9  BMI (kg/m2): 33.4  BSA (m2): 2.43T(C): 36  HR: 55  BP: 165/79  RR: 16  SpO2: 98%    Constitutional: NAD  HEENT: PERRLA, EOMI,    Neck:  No JVD  Respiratory: CTAB/L  Cardiovascular: S1 and S2  Gastrointestinal: BS+, soft, NT/ND  Extremities: No peripheral edema  Neurological: A/O x 3, no focal deficits  Psychiatric: Normal mood, normal affect  Skin: No rashes    ASA Class: I [ ]  II [ ]  III [x ]  IV [ ]    COMMENTS:    The patient is a suitable candidate for the planned procedure unless box checked [ ]  No, explain:

## 2023-01-25 NOTE — ASU PATIENT PROFILE, ADULT - FALL HARM RISK - UNIVERSAL INTERVENTIONS
Bed in lowest position, wheels locked, appropriate side rails in place/Call bell, personal items and telephone in reach/Instruct patient to call for assistance before getting out of bed or chair/Non-slip footwear when patient is out of bed/[place] to call system/Physically safe environment - no spills, clutter or unnecessary equipment/Purposeful Proactive Rounding/Room/bathroom lighting operational, light cord in reach

## 2023-01-25 NOTE — ASU PATIENT PROFILE, ADULT - NSICDXPASTSURGICALHX_GEN_ALL_CORE_FT
PAST SURGICAL HISTORY:  Benign carcinoid tumor of stomach     H/O foot surgery     S/P tonsillectomy     Tooth decay surgery for tooth extraction

## 2023-01-25 NOTE — ASU PATIENT PROFILE, ADULT - NSICDXPASTMEDICALHX_GEN_ALL_CORE_FT
PAST MEDICAL HISTORY:  Abdominal hernia     Diabetes     HLD (hyperlipidemia)     HTN (hypertension)     Mass right foot soft tissue mass in August 2013    Mitral regurgitation     TATYANA (obstructive sleep apnea)     Snoring TATYANA precautions--responds affirmatively to STOP BANG questionnaire--admits to loud snoring and daytime somnolence; witnessed apneic events by wife; age > 50; gender, male    Type 2 diabetes mellitus     Uncontrolled hypertension BP ranged 170-250/--asymptomatic. Sent from PAST office on 8/12/13 to [facility] ER via ambulance

## 2023-01-25 NOTE — ASU DISCHARGE PLAN (ADULT/PEDIATRIC) - NS MD DC FALL RISK RISK
For information on Fall & Injury Prevention, visit: [email or web address] OR  [email or web address] OR  https://www.cdc.gov/steadi/patient.html

## 2023-01-27 ENCOUNTER — NON-APPOINTMENT (OUTPATIENT)
Age: 78
End: 2023-01-27

## 2023-01-27 DIAGNOSIS — K25.9 GASTRIC ULCER, UNSPECIFIED AS ACUTE OR CHRONIC, W/OUT HEMORRHAGE OR PERFORATION: ICD-10-CM

## 2023-01-31 LAB — SURGICAL PATHOLOGY STUDY: SIGNIFICANT CHANGE UP

## 2023-02-07 ENCOUNTER — NON-APPOINTMENT (OUTPATIENT)
Age: 78
End: 2023-02-07

## 2023-04-07 PROBLEM — E11.9 TYPE 2 DIABETES MELLITUS WITHOUT COMPLICATIONS: Chronic | Status: ACTIVE | Noted: 2023-01-25

## 2023-04-20 NOTE — ASU PATIENT PROFILE, ADULT - SPIRITUAL CULTURAL, CURRENT SITUATION, PROFILE
The skin at the access site was anesthetized. Using a flashback needle with the Modified Seldinger technique the right femoral vein was succesfully accessed using a INTRODUCER SHTH 7FR 11CM MINI GW TANIA+ MID LGTH EVAS STRL. none

## 2023-06-05 ENCOUNTER — APPOINTMENT (OUTPATIENT)
Dept: PULMONOLOGY | Facility: CLINIC | Age: 78
End: 2023-06-05
Payer: MEDICARE

## 2023-06-05 VITALS
SYSTOLIC BLOOD PRESSURE: 132 MMHG | HEIGHT: 73 IN | DIASTOLIC BLOOD PRESSURE: 69 MMHG | TEMPERATURE: 97.3 F | RESPIRATION RATE: 15 BRPM | WEIGHT: 256 LBS | HEART RATE: 54 BPM | OXYGEN SATURATION: 94 % | BODY MASS INDEX: 33.93 KG/M2

## 2023-06-05 DIAGNOSIS — G47.33 OBSTRUCTIVE SLEEP APNEA (ADULT) (PEDIATRIC): ICD-10-CM

## 2023-06-05 DIAGNOSIS — Z99.89 OBSTRUCTIVE SLEEP APNEA (ADULT) (PEDIATRIC): ICD-10-CM

## 2023-06-05 DIAGNOSIS — E66.9 OBESITY, UNSPECIFIED: ICD-10-CM

## 2023-06-05 PROCEDURE — 99212 OFFICE O/P EST SF 10 MIN: CPT

## 2023-06-05 RX ORDER — SULFAMETHOXAZOLE AND TRIMETHOPRIM 800; 160 MG/1; MG/1
800-160 TABLET ORAL TWICE DAILY
Qty: 28 | Refills: 0 | Status: DISCONTINUED | COMMUNITY
Start: 2019-11-06 | End: 2023-06-05

## 2023-06-05 RX ORDER — LACTOBACIL 2/BIFIDO 1/S.THERMO 900B CELL
PACKET (EA) ORAL
Qty: 60 | Refills: 1 | Status: DISCONTINUED | COMMUNITY
Start: 2020-06-30 | End: 2023-06-05

## 2023-06-05 RX ORDER — POLYETHYLENE GLYCOL 3350 AND ELECTROLYTES WITH LEMON FLAVOR 236; 22.74; 6.74; 5.86; 2.97 G/4L; G/4L; G/4L; G/4L; G/4L
236 POWDER, FOR SOLUTION ORAL
Qty: 1 | Refills: 0 | Status: DISCONTINUED | COMMUNITY
Start: 2022-09-20 | End: 2023-06-05

## 2023-06-05 NOTE — HISTORY OF PRESENT ILLNESS
[Obstructive Sleep Apnea] : obstructive sleep apnea [FreeTextEntry1] : 77 year male on follow-up visit for:\par \par ·	Severe TATYANA (AHI 50.1) on CPAP 13 cmH2O using a nasal pillows mask.\par \par DX:  2013 with severe TATYANA (AHI 63) and started CPAP at outside center.\par 4/10/2016 Split study:  AHI 50.1 was normalized to 5.3 on CPAP 12 cmH2O.  T<90 1.5%. Lowest sat 71%. \par Patient states his sleep has improved with nightly use of CPAP.\par \par No interim changes in health or weight.  \par Retired  since 2000.\par PMH (meds):  HTN (labetolol, losartan, norvasc, HCTZ), HLD (Crestor), DM (Glipizide),  (omeprazole), obesity class I.  \par \par No wireless [id number] data available. Patient did not bring SD card. [Snoring] : no snoring [Witnessed Apneas] : no witnessed sleep apnea [Frequent Nocturnal Awakening] : no nocturnal awakening [Unintentional Sleep while Active] : no unintentional sleep while active [Unintentional Sleep While Inactive] : no unintentional sleep while inactive [Awakes Unrefreshed] : does not awake unrefreshed [Awakes with Headache] : no headache upon awakening [Awakening With Dry Mouth] : no dry mouth upon awakening [Daytime Somnolence] : no daytime somnolence [ESS] : 7

## 2023-06-05 NOTE — ASSESSMENT
[FreeTextEntry1] : 77 year old ANGELIQUE KOENIG   complies well and benefits from CPAP therapy since 2013 for: \par \par ·	Severe obstructive sleep apnea  (AHI 50.1) on CPAP 13 cmH2O using a nasal pillows mask.\par \par Sleep has improved with CPAP.  [place] Sleepiness Scale score is 7.\par   \par CPAP compliant 100% of days, 100% for >4-hrs, averaging 8-hrs.\par Normal AHI at 0.6.  Occasional mask leak (27.9 L/min)\par \par [employer] PAP supply renewal to continue therapy at the current settings. \par Annual follow-up, sooner if needed.\par \par \par \par \par \par \par

## 2023-06-05 NOTE — REVIEW OF SYSTEMS
[Obesity] : obesity [Negative] : Psychiatric [Fatigue] : no fatigue [Difficulty Initiating Sleep] : no difficulty falling asleep [Difficulty Maintaining Sleep] : no difficulty maintaining sleep [Lower Extremity Discomfort] : no lower extremity discomfort [Unusual Sleep Behavior] : no unusual sleep behavior [FreeTextEntry1] : 10-11:30 pm [FreeTextEntry2] : 6-7 am [FreeTextEntry3] : 30-mins. [FreeTextEntry4] : 0-1 to urinate [FreeTextEntry5] : 30-mins  [FreeTextEntry6] : 6-7 hours [FreeTextEntry7] : None

## 2023-07-07 ENCOUNTER — RX RENEWAL (OUTPATIENT)
Age: 78
End: 2023-07-07

## 2024-01-10 ENCOUNTER — RX RENEWAL (OUTPATIENT)
Age: 79
End: 2024-01-10

## 2024-01-10 RX ORDER — OMEPRAZOLE 40 MG/1
40 CAPSULE, DELAYED RELEASE ORAL TWICE DAILY
Qty: 60 | Refills: 2 | Status: ACTIVE | COMMUNITY
Start: 2023-01-27 | End: 1900-01-01

## 2024-01-17 ENCOUNTER — TRANSCRIPTION ENCOUNTER (OUTPATIENT)
Age: 79
End: 2024-01-17

## 2024-09-30 ENCOUNTER — APPOINTMENT (OUTPATIENT)
Dept: PULMONOLOGY | Facility: CLINIC | Age: 79
End: 2024-09-30
Payer: MEDICARE

## 2024-09-30 VITALS
HEIGHT: 73 IN | WEIGHT: 251 LBS | RESPIRATION RATE: 15 BRPM | BODY MASS INDEX: 33.27 KG/M2 | DIASTOLIC BLOOD PRESSURE: 66 MMHG | SYSTOLIC BLOOD PRESSURE: 126 MMHG | TEMPERATURE: 97 F | OXYGEN SATURATION: 94 % | HEART RATE: 68 BPM

## 2024-09-30 DIAGNOSIS — G47.33 OBSTRUCTIVE SLEEP APNEA (ADULT) (PEDIATRIC): ICD-10-CM

## 2024-09-30 PROCEDURE — 99213 OFFICE O/P EST LOW 20 MIN: CPT

## 2024-09-30 NOTE — REVIEW OF SYSTEMS
[Obesity] : obesity [Negative] : Psychiatric [Diabetes] : diabetes  [Recent Wt Loss (___ Lbs)] : recent [unfilled] ~Ulb weight loss [Fatigue] : no fatigue [Snoring] : no snoring [Witnessed Apneas] : demonstrated no ~M apnea [Frequent Nocturnal Awakenings] : no nocturnal awakenings from sleep [Daytime Somnolence: ESS=____] : no daytime somnolence [Unintentional Sleep while active] : no unintentional sleep while active [Unintentional Sleep while inactive] : no unintentional sleep while inactive [Awakes Unrefreshed] : restorative sleep [Awakes With Headache] : awakes without a headache [Awakes With Dry Mouth] : awakes without dry mouth [Difficulty Initiating Sleep] : no difficulty falling asleep [Difficulty Maintaining Sleep] : no difficulty maintaining sleep [Lower Extremity Discomfort] : no lower extremity discomfort [Unusual Sleep Behavior] : no unusual sleep behavior [Hypersomnolence] : not sleeping much more than usual [Cataplexy] :  no cataplexy [Hypnogogic Hallucinations] : no hypnogogic hallucinations [Hypnopompic Hallucinations] : no hypnopompic hallucinations [Sleep Paralysis] : no sleep paralysis [FreeTextEntry1] : 11 PM [FreeTextEntry2] : 6:30 am [FreeTextEntry3] : 30-mins. [FreeTextEntry4] : 1 x [FreeTextEntry5] : 30-mins  [FreeTextEntry6] : 7 hours [FreeTextEntry7] : once a week for 45 to 60-minutes, on recliner, without CPAP, reportedly refreshing

## 2024-09-30 NOTE — HISTORY OF PRESENT ILLNESS
[CPAP: ___ cmH2O] : CPAP: [unfilled] cmH2O [Nocturnal Oxygen] : The patient does not use nocturnal oxygen [FreeTextEntry1] : DEVICE: [employer] 10 AUTOSET  DME COMPANY: [employer] SET-UP: 1/7/2021

## 2024-09-30 NOTE — ASSESSMENT
[FreeTextEntry1] : 77 y/o M complies well and benefits from CPAP therapy for:   	Severe obstructive sleep apnea (AHI 50.1) on CPAP 13 cmH2O using a nasal pillows mask.  Sleep has improved with CPAP.      CPAP compliant 100% of days, 100% for >4-hrs, averaging 7-hrs and 45-minutes. Normal AHI at 0.6/hr on 13 cmH20.  Occasional mask leak (27.0 L/min)  [employer] PAP supply renewal to continue therapy at the current settings.  Annual follow-up, sooner if needed.

## 2024-09-30 NOTE — PHYSICAL EXAM
[Normal Appearance] : normal appearance [General Appearance - In No Acute Distress] : no acute distress [Normal Conjunctiva] : the conjunctiva exhibited no abnormalities [Neck Appearance] : the appearance of the neck was normal [Heart Rate And Rhythm] : heart rate was normal and rhythm regular [Murmurs] : no murmurs [] : no respiratory distress [Auscultation Breath Sounds / Voice Sounds] : lungs were clear to auscultation bilaterally [Involuntary Movements] : no involuntary movements were seen [No Focal Deficits] : no focal deficits [Affect] : the affect was normal [Mood] : the mood was normal [Elongated Uvula] : elongated uvula [IV] : IV [Heart Sounds] : normal S1 and S2 [Respiration, Rhythm And Depth] : normal respiratory rhythm and effort [Exaggerated Use Of Accessory Muscles For Inspiration] : no accessory muscle use [Cyanosis, Localized] : no localized cyanosis [Oriented To Time, Place, And Person] : oriented to person, place, and time [Impaired Insight] : insight and judgment were intact [FreeTextEntry2] : no edema

## 2025-04-22 ENCOUNTER — APPOINTMENT (OUTPATIENT)
Dept: GASTROENTEROLOGY | Facility: CLINIC | Age: 80
End: 2025-04-22
Payer: MEDICARE

## 2025-04-22 VITALS
BODY MASS INDEX: 33.13 KG/M2 | HEIGHT: 73 IN | HEART RATE: 67 BPM | SYSTOLIC BLOOD PRESSURE: 100 MMHG | DIASTOLIC BLOOD PRESSURE: 58 MMHG | OXYGEN SATURATION: 99 % | WEIGHT: 250 LBS

## 2025-04-22 DIAGNOSIS — G47.33 OBSTRUCTIVE SLEEP APNEA (ADULT) (PEDIATRIC): ICD-10-CM

## 2025-04-22 DIAGNOSIS — K25.9 GASTRIC ULCER, UNSPECIFIED AS ACUTE OR CHRONIC, W/OUT HEMORRHAGE OR PERFORATION: ICD-10-CM

## 2025-04-22 DIAGNOSIS — D12.6 BENIGN NEOPLASM OF COLON, UNSPECIFIED: ICD-10-CM

## 2025-04-22 DIAGNOSIS — D21.4 BENIGN NEOPLASM OF CONNECTIVE AND OTHER SOFT TISSUE OF ABDOMEN: ICD-10-CM

## 2025-04-22 PROCEDURE — 99214 OFFICE O/P EST MOD 30 MIN: CPT

## 2025-04-22 RX ORDER — EMPAGLIFLOZIN 25 MG/1
TABLET, FILM COATED ORAL
Refills: 0 | Status: ACTIVE | COMMUNITY

## 2025-04-22 RX ORDER — ROSUVASTATIN CALCIUM 10 MG/1
10 TABLET, FILM COATED ORAL
Refills: 0 | Status: ACTIVE | COMMUNITY

## 2025-04-28 ENCOUNTER — APPOINTMENT (OUTPATIENT)
Dept: CT IMAGING | Facility: CLINIC | Age: 80
End: 2025-04-28
Payer: MEDICARE

## 2025-04-28 ENCOUNTER — OUTPATIENT (OUTPATIENT)
Dept: OUTPATIENT SERVICES | Facility: HOSPITAL | Age: 80
LOS: 1 days | End: 2025-04-28
Payer: MEDICARE

## 2025-04-28 DIAGNOSIS — D3A.092 BENIGN CARCINOID TUMOR OF THE STOMACH: Chronic | ICD-10-CM

## 2025-04-28 DIAGNOSIS — Z98.890 OTHER SPECIFIED POSTPROCEDURAL STATES: Chronic | ICD-10-CM

## 2025-04-28 DIAGNOSIS — D21.4 BENIGN NEOPLASM OF CONNECTIVE AND OTHER SOFT TISSUE OF ABDOMEN: ICD-10-CM

## 2025-04-28 PROCEDURE — 74150 CT ABDOMEN W/O CONTRAST: CPT | Mod: 26

## 2025-04-28 PROCEDURE — 74150 CT ABDOMEN W/O CONTRAST: CPT

## 2025-04-30 ENCOUNTER — TRANSCRIPTION ENCOUNTER (OUTPATIENT)
Age: 80
End: 2025-04-30

## 2025-04-30 ENCOUNTER — RESULT REVIEW (OUTPATIENT)
Age: 80
End: 2025-04-30

## 2025-04-30 ENCOUNTER — APPOINTMENT (OUTPATIENT)
Dept: GASTROENTEROLOGY | Facility: HOSPITAL | Age: 80
End: 2025-04-30

## 2025-04-30 ENCOUNTER — OUTPATIENT (OUTPATIENT)
Dept: OUTPATIENT SERVICES | Facility: HOSPITAL | Age: 80
LOS: 1 days | End: 2025-04-30
Payer: MEDICARE

## 2025-04-30 VITALS
WEIGHT: 250 LBS | DIASTOLIC BLOOD PRESSURE: 80 MMHG | OXYGEN SATURATION: 98 % | SYSTOLIC BLOOD PRESSURE: 157 MMHG | RESPIRATION RATE: 25 BRPM | TEMPERATURE: 97 F | HEART RATE: 55 BPM | HEIGHT: 73 IN

## 2025-04-30 VITALS
HEART RATE: 55 BPM | RESPIRATION RATE: 16 BRPM | OXYGEN SATURATION: 97 % | DIASTOLIC BLOOD PRESSURE: 74 MMHG | SYSTOLIC BLOOD PRESSURE: 138 MMHG

## 2025-04-30 DIAGNOSIS — K25.9 GASTRIC ULCER, UNSPECIFIED AS ACUTE OR CHRONIC, WITHOUT HEMORRHAGE OR PERFORATION: ICD-10-CM

## 2025-04-30 DIAGNOSIS — Z98.890 OTHER SPECIFIED POSTPROCEDURAL STATES: Chronic | ICD-10-CM

## 2025-04-30 DIAGNOSIS — D3A.092 BENIGN CARCINOID TUMOR OF THE STOMACH: Chronic | ICD-10-CM

## 2025-04-30 LAB — GLUCOSE BLDC GLUCOMTR-MCNC: 109 MG/DL — HIGH (ref 70–99)

## 2025-04-30 PROCEDURE — 88305 TISSUE EXAM BY PATHOLOGIST: CPT | Mod: 26

## 2025-04-30 PROCEDURE — 82962 GLUCOSE BLOOD TEST: CPT

## 2025-04-30 PROCEDURE — 43239 EGD BIOPSY SINGLE/MULTIPLE: CPT

## 2025-04-30 PROCEDURE — 88342 IMHCHEM/IMCYTCHM 1ST ANTB: CPT

## 2025-04-30 PROCEDURE — 88342 IMHCHEM/IMCYTCHM 1ST ANTB: CPT | Mod: 26

## 2025-04-30 PROCEDURE — 88305 TISSUE EXAM BY PATHOLOGIST: CPT

## 2025-04-30 RX ADMIN — Medication 30 MILLILITER(S): at 10:02

## 2025-04-30 NOTE — ASU PATIENT PROFILE, ADULT - FALL HARM RISK - HARM RISK INTERVENTIONS

## 2025-04-30 NOTE — ASU DISCHARGE PLAN (ADULT/PEDIATRIC) - FINANCIAL ASSISTANCE
[facility] provides services at a reduced cost to those who are determined to be eligible through [facility]’s financial assistance program. Information regarding [facility]’s financial assistance program can be found by going to [email or web address] or by calling [phone].

## 2025-04-30 NOTE — PRE-ANESTHESIA EVALUATION ADULT - TEMPERATURE IN CELSIUS (DEGREES C)
Name: Margret Spears  : 1955  MRN: [id number]    Date: 2023    Benefits of immediately proceeding with Radiology exam outweigh the risks and therefore the following is being waived:      [] Pregnancy test    [] Protocol for Iodine allergy    [] MRI questionnaire    [x] BUN/Creatinine        DO Nasreen Donnelly DO  23 [id number]
Nurse to nurse given to Saniya Pendleton at [address] [place] [address]. Pt going to room 315 bed 2.  Wife meeting patient at hospital.      Matthias Barfield RN  11/21/23 [id number]
Report given to REUBEN King RN  11/21/23 [id number]
35.9

## 2025-04-30 NOTE — ASU PATIENT PROFILE, ADULT - NSICDXPASTMEDICALHX_GEN_ALL_CORE_FT
PAST MEDICAL HISTORY:  Abdominal hernia     Diabetes     HLD (hyperlipidemia)     HTN (hypertension)     Mass right foot soft tissue mass in August 2013    Mitral regurgitation     TATYANA (obstructive sleep apnea)     Snoring TATYANA precautions--responds affirmatively to STOP BANG questionnaire--admits to loud snoring and daytime somnolence; witnessed apneic events by wife; age > 50; gender, male    Type 2 diabetes mellitus     Uncontrolled hypertension BP ranged 170-250/--asymptomatic. Sent from PAST office on 8/12/13 to [facility] ER via ambulance

## 2025-04-30 NOTE — PRE PROCEDURE NOTE - PRE PROCEDURE EVALUATION
Attending Physician:              Kyle Lozoya MD MSEd    Indication for Procedure          GIST, PUD      PAST MEDICAL & SURGICAL HISTORY:  Mass  right foot soft tissue mass in August 2013      Snoring  TATYANA precautions--responds affirmatively to STOP BANG questionnaire--admits to loud snoring and daytime somnolence; witnessed apneic events by wife; age > 50; gender, male      Uncontrolled hypertension  BP ranged 170-250/--asymptomatic. Sent from PAST office on 8/12/13 to [facility] ER via ambulance      Abdominal hernia      HTN (hypertension)      HLD (hyperlipidemia)      Diabetes      Mitral regurgitation      TATYANA (obstructive sleep apnea)      Type 2 diabetes mellitus      S/P tonsillectomy      Tooth decay  surgery for tooth extraction      H/O foot surgery      Benign carcinoid tumor of stomach            See Allscripts Note for further details  ALLERGIES:  No Known Allergies    HOME MEDICATIONS:  Crestor 5 mg oral tablet: 1 tab(s) orally once a day  glipiZIDE 2.5 mg oral tablet, extended release: 1 tab(s) orally once a day  hydroCHLOROthiazide 25 mg oral tablet: 1 tab(s) orally once a day  labetalol: 200 milligram(s) orally 2 times a day  losartan 50 mg oral tablet: 1 tab(s) orally once a day  Norvasc 10 mg oral tablet: 1 tab(s) orally once a day      See Allscripts Note for further details    AICD/PPM: [ ] yes   [x ] no    PERTINENT LAB DATA:                      PHYSICAL EXAMINATION:    T(C): --  HR: --  BP: --  RR: --  SpO2: --    Constitutional: NAD  HEENT: PERRLA, EOMI,    Neck:  No JVD  Respiratory: CTAB/L  Cardiovascular: S1 and S2  Gastrointestinal: BS+, soft, NT/ND  Extremities: No peripheral edema  Neurological: A/O x 3, no focal deficits  Psychiatric: Normal mood, normal affect  Skin: No rashes    ASA Class: I [ ]  II [ x]  III [ ]  IV [ ]    COMMENTS:    The patient is a suitable candidate for the planned procedure unless box checked [ ]  No, explain:

## 2025-05-08 LAB — SURGICAL PATHOLOGY STUDY: SIGNIFICANT CHANGE UP

## 2025-05-14 ENCOUNTER — OUTPATIENT (OUTPATIENT)
Dept: OUTPATIENT SERVICES | Facility: HOSPITAL | Age: 80
LOS: 1 days | End: 2025-05-14
Payer: MEDICARE

## 2025-05-14 ENCOUNTER — APPOINTMENT (OUTPATIENT)
Dept: CT IMAGING | Facility: CLINIC | Age: 80
End: 2025-05-14
Payer: MEDICARE

## 2025-05-14 DIAGNOSIS — Z98.890 OTHER SPECIFIED POSTPROCEDURAL STATES: Chronic | ICD-10-CM

## 2025-05-14 DIAGNOSIS — D3A.092 BENIGN CARCINOID TUMOR OF THE STOMACH: Chronic | ICD-10-CM

## 2025-05-14 PROCEDURE — 74160 CT ABDOMEN W/CONTRAST: CPT

## 2025-05-14 PROCEDURE — 74160 CT ABDOMEN W/CONTRAST: CPT | Mod: 26

## 2025-05-16 ENCOUNTER — NON-APPOINTMENT (OUTPATIENT)
Age: 80
End: 2025-05-16

## (undated) DEVICE — TUBING SUCTION CONN 6FT STERILE

## (undated) DEVICE — BIOPSY FORCEP RADIAL JAW 4 STANDARD WITH NEEDLE

## (undated) DEVICE — SYR ALLIANCE II INFLATION 60ML

## (undated) DEVICE — TUBING IV SET GRAVITY 3Y 100" MACRO

## (undated) DEVICE — CATH IV SAFE BC 20G X 1.16" (PINK)

## (undated) DEVICE — BALLOON US ENDO

## (undated) DEVICE — SOL INJ NS 0.9% 500ML 2 PORT

## (undated) DEVICE — CATH IV SAFE BC 22G X 1" (BLUE)

## (undated) DEVICE — BITE BLOCK ADULT 20 X 27MM (GREEN)

## (undated) DEVICE — SUCTION YANKAUER NO CONTROL VENT

## (undated) DEVICE — TUBING SUCTION 20FT

## (undated) DEVICE — PACK IV START WITH CHG

## (undated) DEVICE — SENSOR O2 FINGER ADULT

## (undated) DEVICE — ELCTR GROUNDING PAD ADULT COVIDIEN

## (undated) DEVICE — CLAMP BX HOT RAD JAW 3

## (undated) DEVICE — FORCEP RADIAL JAW 4 JUMBO 2.8MM 3.2MM 240CM ORANGE DISP

## (undated) DEVICE — IRRIGATOR BIO SHIELD

## (undated) DEVICE — POLY TRAP ETRAP

## (undated) DEVICE — SYR LUER LOK 50CC

## (undated) DEVICE — FOLEY HOLDER STATLOCK 2 WAY ADULT

## (undated) DEVICE — BRUSH COLONOSCOPY CYTOLOGY